# Patient Record
Sex: FEMALE | Race: WHITE | NOT HISPANIC OR LATINO | Employment: PART TIME | URBAN - METROPOLITAN AREA
[De-identification: names, ages, dates, MRNs, and addresses within clinical notes are randomized per-mention and may not be internally consistent; named-entity substitution may affect disease eponyms.]

---

## 2017-01-06 ENCOUNTER — OFFICE VISIT (OUTPATIENT)
Dept: URGENT CARE | Facility: PHYSICIAN GROUP | Age: 62
End: 2017-01-06
Payer: COMMERCIAL

## 2017-01-06 VITALS
SYSTOLIC BLOOD PRESSURE: 134 MMHG | DIASTOLIC BLOOD PRESSURE: 88 MMHG | TEMPERATURE: 99.1 F | WEIGHT: 182.8 LBS | OXYGEN SATURATION: 96 % | BODY MASS INDEX: 29.52 KG/M2 | HEART RATE: 83 BPM | RESPIRATION RATE: 16 BRPM

## 2017-01-06 DIAGNOSIS — Q15.9 EYE ABNORMALITY: ICD-10-CM

## 2017-01-06 PROCEDURE — 99214 OFFICE O/P EST MOD 30 MIN: CPT | Performed by: PHYSICIAN ASSISTANT

## 2017-01-06 ASSESSMENT — ENCOUNTER SYMPTOMS
GASTROINTESTINAL NEGATIVE: 1
PSYCHIATRIC NEGATIVE: 1
EYE REDNESS: 0
MUSCULOSKELETAL NEGATIVE: 1
HEADACHES: 0
PHOTOPHOBIA: 0
EYE PAIN: 0
CARDIOVASCULAR NEGATIVE: 1
EYE DISCHARGE: 0
BLURRED VISION: 0
DIZZINESS: 0
RESPIRATORY NEGATIVE: 1
DOUBLE VISION: 0

## 2017-01-06 NOTE — PATIENT INSTRUCTIONS
[facility] at [address] [place] [address]   [phone]  Head over now as they are expecting you.

## 2017-01-06 NOTE — PROGRESS NOTES
Subjective:      Lurdes Ramon is a 61 y.o. female who presents with Eye Problem            Eye Problem   Pertinent negatives include no blurred vision, eye discharge, double vision, eye redness or photophobia.     Chief Complaint   Patient presents with   • Eye Problem     vission was off lastnight, flash of light, right eye       HPI:  Lurdes Ramon is a 61 y.o. female who presents with right eye concern.  Hx of retinal detachment in 1st cousins.  They were not in an accident.  PAtient states she noticed a flash of light in the right eye last night towards the top of the visual field.  Described as broken glass.  Not still happening.  Was a bright flashing.  No headache associated with symptoms.  No pain, or redness.  No blurred vision.  Symptoms last for a few seconds.  She was on her computer screen prior to this incident.  Wears over the counter glasses.  Has a hx of floaters in her vision.      Past Medical History   Diagnosis Date   • Back pain    • Insomnia        Past Surgical History   Procedure Laterality Date   • Hysterectomy, total abdominal  1994   • Other orthopedic surgery       back surgery x 2   • Dental extraction(s)     • Lithotripsy         Family History   Problem Relation Age of Onset   • Cancer Sister 58     colorectal   • Heart Attack Father    • Heart Disease Brother 61   • Cancer Mother      brain       Social History     Social History   • Marital Status:      Spouse Name: N/A   • Number of Children: N/A   • Years of Education: N/A     Occupational History   • Not on file.     Social History Main Topics   • Smoking status: Former Smoker -- 1.00 packs/day for 15 years     Types: Cigarettes     Quit date: 10/26/1996   • Smokeless tobacco: Never Used   • Alcohol Use: 0.0 oz/week     0 Standard drinks or equivalent per week      Comment: socially   • Drug Use: Not on file   • Sexual Activity: Not on file     Other Topics Concern   • Not on file     Social History  Narrative   • No narrative on file         Current outpatient prescriptions:   •  zolpidem, 10 mg, Oral, HS PRN, 1/5/2017  •  Hydrocodone-Acetaminophen, Take  by mouth., prn  •  estradiol, 1 Patch, Transdermal, QSUN-WED, 12/30/2016  •  alprazolam, 0.5 mg, Oral, HS PRN, prn    Allergies   Allergen Reactions   • Pcn [Penicillins] Hives   • Sulfa Drugs Hives            Review of Systems   Eyes: Negative for blurred vision, double vision, photophobia, pain, discharge and redness.        Flashing light   Respiratory: Negative.    Cardiovascular: Negative.    Gastrointestinal: Negative.    Genitourinary: Negative.    Musculoskeletal: Negative.    Skin: Negative.    Neurological: Negative for dizziness and headaches.   Endo/Heme/Allergies: Negative for environmental allergies.   Psychiatric/Behavioral: Negative.           Objective:     /88 mmHg  Pulse 83  Temp(Src) 37.3 °C (99.1 °F)  Resp 16  Wt 82.918 kg (182 lb 12.8 oz)  SpO2 96%     Physical Exam       Nursing note and Vitals Reviewed.    Constitutional:   Appropriately groomed, pleasant affect, well nourished, in NAD.    Head:   Normocephalic, atraumatic.    Eyes:   PERRLA, EOM's full, sclera white, conjunctiva not erythematous, and medial canthus without exudate bilaterally.  Fundoscopic exam demonstrates a yellow optic disk with surrounding retina without AV nicking or drusen bodies.  No cotton whool spotting.  No retinal detachment.    Ears:  Auricle and tragus non-tender to manipulation.  No pre-auricular lymphadenopathy or mastoid ttp.  EACs with mild cerumen bilaterally, not erythematous.  TM’s pearly gray with cone of light present and umbo and malleolus visible bilaterally.  No bulging or fluid bubbles present in middle ear.  Hearing grossly intact to voice.    Nose:  Nares patent bilaterally.      Throat:  Dentition wnl, mucosa moist without lesions.  Oropharynx mildly erythematous, with no enlargement of the palatine tonsils bilaterally with no  exudates.    Post nasal drainage present.  Soft palate rises symmetrically bilaterally and uvula midline.      Neck: Neck supple, with mild anterior lymphadenopathy that is soft and mobile to palpation. Thyroid non-palpable without tenderness or nodules. No supraclavicular lymphadenopathy.    Lungs:  Respiratory effort not labored without accessory muscle use.  Lungs clear to auscultation bilaterally without wheezes, rales, or rhonchi.    Heart:  RRR, without murmurs rubs or gallops.  Radial and dorsalis pedis pulse 2+ bilaterally.  No LE edema.    Musculoskeletal:  Gait non-antalgic with a narrow base.    Derm:  Skin without rashes or lesions with good turgor pressure.      Psychiatric:  Mood, affect, and judgement appropriate.       Assessment/Plan:     1. Eye abnormality  Patient presents with right eye bright flashing lasted for less than a second last night. Patient was on her computer for extensive amount of time prior to the episode or no previous episodes like this, however patient does have retinal detachment family history. No recent trauma. No change in vision, blurred vision, halos, eye pain. No history of glaucoma in the family. Physical exam unremarkable. Funduscopic exam unremarkable. Given limitation of funduscopic exam in urgent care setting, did send patient to [facility] for urgent evaluation.     Patient was in agreement with this treatment plan and seemed to understand without barriers. All questions were encouraged and answered.  Reviewed signs and symptoms of when to seek emergency medical care.     Please note that this dictation was created using voice recognition software.  I have made every reasonable attempt to correct obvious errors, but I expect there are errors of nakul and possibly content that I did not discover before finalizing the note.

## 2017-01-06 NOTE — MR AVS SNAPSHOT
Lurdes Castro Ramon   2017 8:30 AM   Office Visit   MRN: [id number]    Department:  [facility]   Dept Phone:  [phone]    Description:  Female : 1955   Provider:  Stanton Long PA-C           Reason for Visit     Eye Problem vission was off lastnight, flash of light, right eye      Allergies as of 2017     Allergen Noted Reactions    Pcn [Penicillins] 10/26/2016   Hives    Sulfa Drugs 10/26/2016   Hives      Vital Signs     Blood Pressure Pulse Temperature Respirations Weight Oxygen Saturation    134/88 mmHg 83 37.3 °C (99.1 °F) 16 82.918 kg (182 lb 12.8 oz) 96%    Smoking Status                   Former Smoker           Basic Information     Date Of Birth Sex Race Ethnicity Preferred Language    1955 Female White Non- English      Problem List              ICD-10-CM Priority Class Noted - Resolved    Encounter to establish care with new doctor Z71.89   10/26/2016 - Present    Insomnia G47.00   10/26/2016 - Present    Low back pain M54.5   10/26/2016 - Present    Anxiety F41.9   10/26/2016 - Present      Health Maintenance        Date Due Completion Dates    PAP SMEAR 1976 ---    MAMMOGRAM 1995 ---    COLONOSCOPY 2005 ---    IMM ZOSTER VACCINE 2015 ---    IMM DTaP/Tdap/Td Vaccine (2 - Td) 2026            Current Immunizations     Influenza Vaccine Quad Inj (Preserved) 10/26/2016  8:51 AM    Tdap Vaccine 2016      Below and/or attached are the medications your provider expects you to take. Review all of your home medications and newly ordered medications with your provider and/or pharmacist. Follow medication instructions as directed by your provider and/or pharmacist. Please keep your medication list with you and share with your provider. Update the information when medications are discontinued, doses are changed, or new medications (including over-the-counter products) are added; and carry medication information at all  times in the event of emergency situations     Allergies:  PCN - Hives     SULFA DRUGS - Hives               Medications  Valid as of: January 06, 2017 - 11:05 AM    Generic Name Brand Name Tablet Size Instructions for use    ALPRAZolam (Tab) XANAX 0.5 MG Take 1 Tab by mouth at bedtime as needed for Sleep.        Estradiol (PATCH WEEKLY) CLIMARA 0.075 MG/24HR Apply 1 Patch to skin as directed Every Sunday and Wednesday.        Hydrocodone-Acetaminophen (Tab) Hydrocodone-Acetaminophen 5-300 MG Take  by mouth.        Zolpidem Tartrate (Tab) AMBIEN 10 MG Take 1 Tab by mouth at bedtime as needed for Sleep.        .                 Medicines prescribed today were sent to:     [facility] [id number] [place] [address] AT [address] [facility] [address]    Phone: [phone] Fax: [phone]    Open 24 Hours?: No      Medication refill instructions:       If your prescription bottle indicates you have medication refills left, it is not necessary to call your provider’s office. Please contact your pharmacy and they will refill your medication.    If your prescription bottle indicates you do not have any refills left, you may request refills at any time through one of the following ways: The online [employer] system (except Urgent Care), by calling your provider’s office, or by asking your pharmacy to contact your provider’s office with a refill request. Medication refills are processed only during regular business hours and may not be available until the next business day. Your provider may request additional information or to have a follow-up visit with you prior to refilling your medication.   *Please Note: Medication refills are assigned a new Rx number when refilled electronically. Your pharmacy may indicate that no refills were authorized even though a new prescription for the same medication is available at the pharmacy. Please request the medicine by name with  the pharmacy before contacting your provider for a refill.        Instructions    [facility] at [address] [place] [address] in [place]   [phone]  Head over now as they are expecting you.          [employer] Access Code: Activation code not generated  Current [employer] Status: Active

## 2017-01-25 ENCOUNTER — OFFICE VISIT (OUTPATIENT)
Dept: MEDICAL GROUP | Facility: PHYSICIAN GROUP | Age: 62
End: 2017-01-25
Payer: COMMERCIAL

## 2017-01-25 ENCOUNTER — HOSPITAL ENCOUNTER (OUTPATIENT)
Dept: RADIOLOGY | Facility: MEDICAL CENTER | Age: 62
End: 2017-01-25
Attending: NURSE PRACTITIONER
Payer: COMMERCIAL

## 2017-01-25 VITALS
HEIGHT: 66 IN | HEART RATE: 75 BPM | RESPIRATION RATE: 16 BRPM | OXYGEN SATURATION: 96 % | SYSTOLIC BLOOD PRESSURE: 128 MMHG | BODY MASS INDEX: 29.41 KG/M2 | DIASTOLIC BLOOD PRESSURE: 74 MMHG | TEMPERATURE: 98.4 F | WEIGHT: 183 LBS

## 2017-01-25 DIAGNOSIS — M54.5 LOW BACK PAIN, UNSPECIFIED BACK PAIN LATERALITY, UNSPECIFIED CHRONICITY, WITH SCIATICA PRESENCE UNSPECIFIED: ICD-10-CM

## 2017-01-25 DIAGNOSIS — Z79.890 HORMONE REPLACEMENT THERAPY (HRT): ICD-10-CM

## 2017-01-25 PROCEDURE — 72100 X-RAY EXAM L-S SPINE 2/3 VWS: CPT

## 2017-01-25 PROCEDURE — 99214 OFFICE O/P EST MOD 30 MIN: CPT | Performed by: NURSE PRACTITIONER

## 2017-01-25 RX ORDER — ESTRADIOL 0.07 MG/D
FILM, EXTENDED RELEASE TRANSDERMAL
Refills: 1 | COMMUNITY
Start: 2016-12-26 | End: 2018-01-08

## 2017-01-25 ASSESSMENT — PATIENT HEALTH QUESTIONNAIRE - PHQ9: CLINICAL INTERPRETATION OF PHQ2 SCORE: 0

## 2017-01-25 NOTE — MR AVS SNAPSHOT
"        Lurdes Castro Yenny   2017 9:00 AM   Office Visit   MRN: [id number]    Department:  [facility]   Dept Phone:  [phone]    Description:  Female : 1955   Provider:  BRITTANY Garcia           Reason for Visit     Follow-Up           Allergies as of 2017     Allergen Noted Reactions    Pcn [Penicillins] 10/26/2016   Hives    Sulfa Drugs 10/26/2016   Hives      You were diagnosed with     Hormone replacement therapy (HRT)   [[id number]]       Low back pain, unspecified back pain laterality, unspecified chronicity, with sciatica presence unspecified   [[id number]]         Vital Signs     Blood Pressure Pulse Temperature Respirations Height Weight    128/74 mmHg 75 36.9 °C (98.4 °F) 16 1.676 m (5' 5.98\") 83.008 kg (183 lb)    Body Mass Index Oxygen Saturation Smoking Status             29.55 kg/m2 96% Former Smoker         Basic Information     Date Of Birth Sex Race Ethnicity Preferred Language    1955 Female White Non- English      Your appointments     2017  8:40 AM   Annual/Preventative with JAIDEN Garcia.   [address] [address] [phone]           You will be receiving a confirmation call a few days before your appointment from our automated call confirmation system.   Please bring to your appointment; a photo ID, copies of your insurance card, all medication bottles and any co-pay that you are responsible for.  Please allow 45-60 minutes to complete your scheduled appointment.              Problem List              ICD-10-CM Priority Class Noted - Resolved    Insomnia G47.00   10/26/2016 - Present    Low back pain M54.5   10/26/2016 - Present    Anxiety F41.9   10/26/2016 - Present    Hormone replacement therapy (HRT) Z79.890   2017 - Present      Health Maintenance        Date Due Completion Dates    PAP SMEAR 1976 ---    MAMMOGRAM 1995 ---   " COLONOSCOPY 4/26/2005 ---    IMM ZOSTER VACCINE 4/26/2015 ---    IMM DTaP/Tdap/Td Vaccine (2 - Td) 11/22/2026 11/22/2016            Current Immunizations     Influenza Vaccine Quad Inj (Preserved) 10/26/2016  8:51 AM    Tdap Vaccine 11/22/2016      Below and/or attached are the medications your provider expects you to take. Review all of your home medications and newly ordered medications with your provider and/or pharmacist. Follow medication instructions as directed by your provider and/or pharmacist. Please keep your medication list with you and share with your provider. Update the information when medications are discontinued, doses are changed, or new medications (including over-the-counter products) are added; and carry medication information at all times in the event of emergency situations     Allergies:  PCN - Hives     SULFA DRUGS - Hives               Medications  Valid as of: January 25, 2017 -  9:25 AM    Generic Name Brand Name Tablet Size Instructions for use    ALPRAZolam (Tab) XANAX 0.5 MG Take 1 Tab by mouth at bedtime as needed for Sleep.        Estradiol (PATCH WEEKLY) CLIMARA 0.075 MG/24HR Apply 1 Patch to skin as directed Every Sunday and Wednesday.        Estradiol (PATCH BIWEEKLY) VIVELLE-DOT 0.075 MG/24HR IZZY 1 PA EXT TO THE SKIN 2 TIMES A WEEK        Estradiol (PATCH WEEKLY) CLIMARA 0.1 MG/24HR Apply 1 Patch to skin as directed 2X A WEEK.        Hydrocodone-Acetaminophen (Tab) Hydrocodone-Acetaminophen 5-300 MG Take 1-2 Tabs by mouth every four hours as needed.        Zolpidem Tartrate (Tab) AMBIEN 10 MG Take 1 Tab by mouth at bedtime as needed for Sleep.        .                 Medicines prescribed today were sent to:     [id number]  [facility] - [address] [place] [facility] [address] [facility] [place] [address]    Phone: [phone] Fax: [phone]    Open 24 Hours?: No      Medication refill instructions:       If your prescription bottle  indicates you have medication refills left, it is not necessary to call your provider’s office. Please contact your pharmacy and they will refill your medication.    If your prescription bottle indicates you do not have any refills left, you may request refills at any time through one of the following ways: The online [employer] system (except Urgent Care), by calling your provider’s office, or by asking your pharmacy to contact your provider’s office with a refill request. Medication refills are processed only during regular business hours and may not be available until the next business day. Your provider may request additional information or to have a follow-up visit with you prior to refilling your medication.   *Please Note: Medication refills are assigned a new Rx number when refilled electronically. Your pharmacy may indicate that no refills were authorized even though a new prescription for the same medication is available at the pharmacy. Please request the medicine by name with the pharmacy before contacting your provider for a refill.        Your To Do List     Future Labs/Procedures Complete By Expires    DX-LUMBAR SPINE-2 OR 3 VIEWS  As directed 1/25/2018         [employer] Access Code: Activation code not generated  Current [employer]ift Status: Active

## 2017-01-25 NOTE — PROGRESS NOTES
Chief Complaint   Patient presents with   • Follow-Up       HISTORY OF PRESENT ILLNESS: Patient is a 61 y.o. female established patient who presents today to discuss the following issues:    Low back pain  Has had 2 surgeries, last one was a fusion in 2005.  Recently has been waking up in a lot of pain.  Not related to activity.  Would like to have an xray done.  Discussed plan.  Next step after xray would be referral to neurosurgeon.    Hormone replacement therapy (HRT)  Has been on HRT for 20+ years since total hysterectomy.  Didn't tolerate pills.  Has been on the patch forever and seems to work well.  Would like to go back up to 0.1mg.  Understands the risks vs benefits of HRT, and agrees to get mammograms annually.      Patient Active Problem List    Diagnosis Date Noted   • Hormone replacement therapy (HRT) 01/25/2017   • Insomnia 10/26/2016   • Low back pain 10/26/2016   • Anxiety 10/26/2016       Allergies:Pcn and Sulfa drugs    Current Outpatient Prescriptions   Medication Sig Dispense Refill   • estradiol (VIVELLE-DOT) 0.075 MG/24HR PATCH BIWEEKLY IZZY 1 PA EXT TO THE SKIN 2 TIMES A WEEK  1   • estradiol (CLIMARA) 0.1 MG/24HR PATCH WEEKLY Apply 1 Patch to skin as directed 2X A WEEK. 24 Patch 3   • Hydrocodone-Acetaminophen (VICODIN) 5-300 MG Tab Take 1-2 Tabs by mouth every four hours as needed. 60 Tab 0   • zolpidem (AMBIEN) 10 MG Tab Take 1 Tab by mouth at bedtime as needed for Sleep. 30 Tab 5   • alprazolam (XANAX) 0.5 MG Tab Take 1 Tab by mouth at bedtime as needed for Sleep. 30 Tab 5   • estradiol (CLIMARA) 0.075 MG/24HR PATCH WEEKLY Apply 1 Patch to skin as directed Every Sunday and Wednesday.       No current facility-administered medications for this visit.       Social History   Substance Use Topics   • Smoking status: Former Smoker -- 1.00 packs/day for 15 years     Types: Cigarettes     Quit date: 10/26/1996   • Smokeless tobacco: Never Used   • Alcohol Use: 0.0 oz/week     0 Standard drinks or  "equivalent per week      Comment: socially       No family status information on file.     Family History   Problem Relation Age of Onset   • Cancer Sister 58     colorectal   • Heart Attack Father    • Heart Disease Brother 61   • Cancer Mother      brain       Review of Systems:   Constitutional: Negative for fever, chills, weight loss and malaise/fatigue.   HENT: Negative for ear pain, nosebleeds, congestion, sore throat and neck pain.    Eyes: Negative for blurred vision.   Respiratory: Negative for cough, sputum production, shortness of breath and wheezing.    Cardiovascular: Negative for chest pain, palpitations, orthopnea and leg swelling.   Gastrointestinal: Negative for heartburn, nausea, vomiting and abdominal pain.   Genitourinary: Negative for dysuria, urgency and frequency.   Musculoskeletal: Negative for myalgias snd joint pain.  Positive for chronic low back pain, worsening.  Skin: Negative for rash and itching.   Neurological: Negative for dizziness, tingling, tremors, sensory change, focal weakness and headaches.   Endo/Heme/Allergies: Does not bruise/bleed easily.   Psychiatric/Behavioral: Negative for depression, suicidal ideas and memory loss.  The patient is not nervous/anxious and does not have insomnia.    All other systems reviewed and are negative except as in HPI.    Exam:  Blood pressure 128/74, pulse 75, temperature 36.9 °C (98.4 °F), resp. rate 16, height 1.676 m (5' 5.98\"), weight 83.008 kg (183 lb), SpO2 96 %.  General:  Well nourished, well developed female in NAD  Head: Grossly normal.  Neck: Supple without JVD or bruit. Thyroid is not enlarged.  Pulmonary: Clear to ausculation. Normal effort. No rales, ronchi, or wheezing.  Cardiovascular: Regular rate and rhythm without murmur.   Extremities: No clubbing, cyanosis, or edema.  Skin: Intact with no obvious rashes or lesions.  Neuro: Grossly intact.  Psych: Alert and oriented x 3.  Mood and affect appropriate.    Medical " decision-making and discussion: Lurdes is here today to discuss HRT and low back pain.  An xray was ordered and a prescription for estradiol patch was sent to her pharmacy.  She will plan to follow-up here as needed.          Assessment/Plan:  1. Hormone replacement therapy (HRT)  estradiol (CLIMARA) 0.1 MG/24HR PATCH WEEKLY   2. Low back pain, unspecified back pain laterality, unspecified chronicity, with sciatica presence unspecified  DX-LUMBAR SPINE-2 OR 3 VIEWS       Return if symptoms worsen or fail to improve.    Please note that this dictation was created using voice recognition software. I have made every reasonable attempt to correct obvious errors, but I expect that there are errors of grammar and possibly content that I did not discover before finalizing the note.

## 2017-01-25 NOTE — ASSESSMENT & PLAN NOTE
Has been on HRT for 20+ years since total hysterectomy.  Didn't tolerate pills.  Has been on the patch forever and seems to work well.  Would like to go back up to 0.1mg.  Understands the risks vs benefits of HRT, and agrees to get mammograms annually.

## 2017-01-25 NOTE — ASSESSMENT & PLAN NOTE
Has had 2 surgeries, last one was a fusion in 2005.  Recently has been waking up in a lot of pain.  Not related to activity.  Would like to have an xray done.  Discussed plan.  Next step after xray would be referral to neurosurgeon.

## 2017-04-04 ENCOUNTER — OFFICE VISIT (OUTPATIENT)
Dept: MEDICAL GROUP | Facility: PHYSICIAN GROUP | Age: 62
End: 2017-04-04
Payer: COMMERCIAL

## 2017-04-04 ENCOUNTER — HOSPITAL ENCOUNTER (OUTPATIENT)
Dept: LAB | Facility: MEDICAL CENTER | Age: 62
End: 2017-04-04
Attending: NURSE PRACTITIONER
Payer: COMMERCIAL

## 2017-04-04 VITALS
DIASTOLIC BLOOD PRESSURE: 76 MMHG | HEIGHT: 66 IN | HEART RATE: 75 BPM | SYSTOLIC BLOOD PRESSURE: 128 MMHG | TEMPERATURE: 98.8 F | OXYGEN SATURATION: 95 % | RESPIRATION RATE: 16 BRPM | BODY MASS INDEX: 28.93 KG/M2 | WEIGHT: 180 LBS

## 2017-04-04 DIAGNOSIS — Z00.00 ENCOUNTER FOR WELLNESS EXAMINATION: ICD-10-CM

## 2017-04-04 DIAGNOSIS — Z12.39 SCREENING FOR BREAST CANCER: ICD-10-CM

## 2017-04-04 DIAGNOSIS — M54.5 LOW BACK PAIN, UNSPECIFIED BACK PAIN LATERALITY, UNSPECIFIED CHRONICITY, WITH SCIATICA PRESENCE UNSPECIFIED: ICD-10-CM

## 2017-04-04 LAB
25(OH)D3 SERPL-MCNC: 28 NG/ML (ref 30–100)
ALBUMIN SERPL BCP-MCNC: 4.3 G/DL (ref 3.2–4.9)
ALBUMIN/GLOB SERPL: 1.2 G/DL
ALP SERPL-CCNC: 82 U/L (ref 30–99)
ALT SERPL-CCNC: 14 U/L (ref 2–50)
ANION GAP SERPL CALC-SCNC: 7 MMOL/L (ref 0–11.9)
AST SERPL-CCNC: 18 U/L (ref 12–45)
BASOPHILS # BLD AUTO: 1.1 % (ref 0–1.8)
BASOPHILS # BLD: 0.08 K/UL (ref 0–0.12)
BILIRUB SERPL-MCNC: 0.9 MG/DL (ref 0.1–1.5)
BUN SERPL-MCNC: 15 MG/DL (ref 8–22)
CALCIUM SERPL-MCNC: 9.4 MG/DL (ref 8.5–10.5)
CHLORIDE SERPL-SCNC: 103 MMOL/L (ref 96–112)
CHOLEST SERPL-MCNC: 182 MG/DL (ref 100–199)
CO2 SERPL-SCNC: 28 MMOL/L (ref 20–33)
CREAT SERPL-MCNC: 0.61 MG/DL (ref 0.5–1.4)
EOSINOPHIL # BLD AUTO: 0.08 K/UL (ref 0–0.51)
EOSINOPHIL NFR BLD: 1.1 % (ref 0–6.9)
ERYTHROCYTE [DISTWIDTH] IN BLOOD BY AUTOMATED COUNT: 41 FL (ref 35.9–50)
GFR SERPL CREATININE-BSD FRML MDRD: >60 ML/MIN/1.73 M 2
GLOBULIN SER CALC-MCNC: 3.5 G/DL (ref 1.9–3.5)
GLUCOSE SERPL-MCNC: 98 MG/DL (ref 65–99)
HCT VFR BLD AUTO: 43.7 % (ref 37–47)
HDLC SERPL-MCNC: 54 MG/DL
HGB BLD-MCNC: 14.8 G/DL (ref 12–16)
IMM GRANULOCYTES # BLD AUTO: 0.01 K/UL (ref 0–0.11)
IMM GRANULOCYTES NFR BLD AUTO: 0.1 % (ref 0–0.9)
LDLC SERPL CALC-MCNC: 112 MG/DL
LYMPHOCYTES # BLD AUTO: 1.77 K/UL (ref 1–4.8)
LYMPHOCYTES NFR BLD: 24.9 % (ref 22–41)
MCH RBC QN AUTO: 31.5 PG (ref 27–33)
MCHC RBC AUTO-ENTMCNC: 33.9 G/DL (ref 33.6–35)
MCV RBC AUTO: 93 FL (ref 81.4–97.8)
MONOCYTES # BLD AUTO: 0.51 K/UL (ref 0–0.85)
MONOCYTES NFR BLD AUTO: 7.2 % (ref 0–13.4)
NEUTROPHILS # BLD AUTO: 4.65 K/UL (ref 2–7.15)
NEUTROPHILS NFR BLD: 65.6 % (ref 44–72)
NRBC # BLD AUTO: 0 K/UL
NRBC BLD AUTO-RTO: 0 /100 WBC
PLATELET # BLD AUTO: 264 K/UL (ref 164–446)
PMV BLD AUTO: 10.8 FL (ref 9–12.9)
POTASSIUM SERPL-SCNC: 4.1 MMOL/L (ref 3.6–5.5)
PROT SERPL-MCNC: 7.8 G/DL (ref 6–8.2)
RBC # BLD AUTO: 4.7 M/UL (ref 4.2–5.4)
SODIUM SERPL-SCNC: 138 MMOL/L (ref 135–145)
TRIGL SERPL-MCNC: 79 MG/DL (ref 0–149)
TSH SERPL DL<=0.005 MIU/L-ACNC: 1.2 UIU/ML (ref 0.3–3.7)
WBC # BLD AUTO: 7.1 K/UL (ref 4.8–10.8)

## 2017-04-04 PROCEDURE — 36415 COLL VENOUS BLD VENIPUNCTURE: CPT

## 2017-04-04 PROCEDURE — 82306 VITAMIN D 25 HYDROXY: CPT

## 2017-04-04 PROCEDURE — 85025 COMPLETE CBC W/AUTO DIFF WBC: CPT

## 2017-04-04 PROCEDURE — 80053 COMPREHEN METABOLIC PANEL: CPT

## 2017-04-04 PROCEDURE — 84443 ASSAY THYROID STIM HORMONE: CPT

## 2017-04-04 PROCEDURE — 99396 PREV VISIT EST AGE 40-64: CPT | Performed by: NURSE PRACTITIONER

## 2017-04-04 PROCEDURE — 80061 LIPID PANEL: CPT

## 2017-04-04 NOTE — ASSESSMENT & PLAN NOTE
Is here for her annual wellness visit.  Is due for labs and a mammogram. Has no current concerns or complaints.

## 2017-04-04 NOTE — ASSESSMENT & PLAN NOTE
Saw a Dr. Doherty's PA at [facility].  Is taking Aleve and will start physical therapy.  She will follow-up with them in May.

## 2017-04-04 NOTE — MR AVS SNAPSHOT
"        Lurdes Castro Yenny   2017 8:40 AM   Office Visit   MRN: [id number]    Department:  [facility]   Dept Phone:  [phone]    Description:  Female : 1955   Provider:  BRITTANY Garcia           Reason for Visit     Annual Exam           Allergies as of 2017     Allergen Noted Reactions    Pcn [Penicillins] 10/26/2016   Hives    Sulfa Drugs 10/26/2016   Hives      You were diagnosed with     Encounter for wellness examination   [1899567]       Screening for breast cancer   [358135]         Vital Signs     Blood Pressure Pulse Temperature Respirations Height Weight    128/76 mmHg 75 37.1 °C (98.8 °F) 16 1.676 m (5' 6\") 81.647 kg (180 lb)    Body Mass Index Oxygen Saturation Smoking Status             29.07 kg/m2 95% Former Smoker         Basic Information     Date Of Birth Sex Race Ethnicity Preferred Language    1955 Female White Non- English      Problem List              ICD-10-CM Priority Class Noted - Resolved    Insomnia G47.00   10/26/2016 - Present    Low back pain M54.5   10/26/2016 - Present    Anxiety F41.9   10/26/2016 - Present    Hormone replacement therapy (HRT) Z79.890   2017 - Present    Encounter for wellness examination Z00.00   2017 - Present    Screening for breast cancer Z12.39   2017 - Present      Health Maintenance        Date Due Completion Dates    PAP SMEAR 1976 ---    MAMMOGRAM 1995 ---    COLONOSCOPY 2005 ---    IMM ZOSTER VACCINE 2015 ---    IMM DTaP/Tdap/Td Vaccine (2 - Td) 2026            Current Immunizations     Influenza Vaccine Quad Inj (Preserved) 10/26/2016  8:51 AM    Tdap Vaccine 2016      Below and/or attached are the medications your provider expects you to take. Review all of your home medications and newly ordered medications with your provider and/or pharmacist. Follow medication instructions as directed by your provider and/or pharmacist. Please keep your " medication list with you and share with your provider. Update the information when medications are discontinued, doses are changed, or new medications (including over-the-counter products) are added; and carry medication information at all times in the event of emergency situations     Allergies:  PCN - Hives     SULFA DRUGS - Hives               Medications  Valid as of: April 04, 2017 -  9:11 AM    Generic Name Brand Name Tablet Size Instructions for use    ALPRAZolam (Tab) XANAX 0.5 MG Take 1 Tab by mouth at bedtime as needed for Sleep.        Estradiol (PATCH WEEKLY) CLIMARA 0.075 MG/24HR Apply 1 Patch to skin as directed Every Sunday and Wednesday.        Estradiol (PATCH BIWEEKLY) VIVELLE-DOT 0.075 MG/24HR IZZY 1 PA EXT TO THE SKIN 2 TIMES A WEEK        Estradiol (PATCH WEEKLY) CLIMARA 0.1 MG/24HR Apply 1 Patch to skin as directed 2X A WEEK.        Hydrocodone-Acetaminophen (Tab) Hydrocodone-Acetaminophen 5-300 MG Take 1-2 Tabs by mouth every four hours as needed.        Zolpidem Tartrate (Tab) AMBIEN 10 MG Take 1 Tab by mouth at bedtime as needed for Sleep.        .                 Medicines prescribed today were sent to:     [facility] [id number] [place] [address], [address] AT [address] [facility] [address]    Phone: [phone] Fax: [phone]    Open 24 Hours?: No      Medication refill instructions:       If your prescription bottle indicates you have medication refills left, it is not necessary to call your provider’s office. Please contact your pharmacy and they will refill your medication.    If your prescription bottle indicates you do not have any refills left, you may request refills at any time through one of the following ways: The online [employer] system (except Urgent Care), by calling your provider’s office, or by asking your pharmacy to contact your provider’s office with a refill request. Medication refills are processed only during regular  business hours and may not be available until the next business day. Your provider may request additional information or to have a follow-up visit with you prior to refilling your medication.   *Please Note: Medication refills are assigned a new Rx number when refilled electronically. Your pharmacy may indicate that no refills were authorized even though a new prescription for the same medication is available at the pharmacy. Please request the medicine by name with the pharmacy before contacting your provider for a refill.        Your To Do List     Future Labs/Procedures Complete By Expires    CBC WITH DIFFERENTIAL  As directed 4/4/2018    COMP METABOLIC PANEL  As directed 4/4/2018    LIPID PROFILE  As directed 4/4/2018    MA-SCREEN MAMMO W/CAD-BILAT  As directed 4/4/2018    TSH  As directed 4/4/2018    VITAMIN D,25 HYDROXY  As directed 4/4/2018         MyChart Access Code: Activation code not generated  Current FiscalNote Status: Active

## 2017-04-05 NOTE — PROGRESS NOTES
Chief Complaint   Patient presents with   • Annual Exam       HISTORY OF PRESENT ILLNESS: Patient is a 61 y.o. female established patient who presents today to discuss the following issues:    Screening for breast cancer  Due for screening mammogram after April 8th.    Encounter for wellness examination  Is here for her annual wellness visit.  Is due for labs and a mammogram. Has no current concerns or complaints.    Low back pain  Saw a Dr. Doherty's PA at [facility].  Is taking Aleve and will start physical therapy.  She will follow-up with them in May.      Patient Active Problem List    Diagnosis Date Noted   • Encounter for wellness examination 04/04/2017   • Screening for breast cancer 04/04/2017   • Hormone replacement therapy (HRT) 01/25/2017   • Insomnia 10/26/2016   • Low back pain 10/26/2016   • Anxiety 10/26/2016       Allergies:Pcn and Sulfa drugs    Current Outpatient Prescriptions   Medication Sig Dispense Refill   • estradiol (CLIMARA) 0.1 MG/24HR PATCH WEEKLY Apply 1 Patch to skin as directed 2X A WEEK. 24 Patch 3   • Hydrocodone-Acetaminophen (VICODIN) 5-300 MG Tab Take 1-2 Tabs by mouth every four hours as needed. 60 Tab 0   • zolpidem (AMBIEN) 10 MG Tab Take 1 Tab by mouth at bedtime as needed for Sleep. 30 Tab 5   • alprazolam (XANAX) 0.5 MG Tab Take 1 Tab by mouth at bedtime as needed for Sleep. 30 Tab 5   • estradiol (VIVELLE-DOT) 0.075 MG/24HR PATCH BIWEEKLY IZZY 1 PA EXT TO THE SKIN 2 TIMES A WEEK  1   • estradiol (CLIMARA) 0.075 MG/24HR PATCH WEEKLY Apply 1 Patch to skin as directed Every Sunday and Wednesday.       No current facility-administered medications for this visit.       Social History   Substance Use Topics   • Smoking status: Former Smoker -- 1.00 packs/day for 15 years     Types: Cigarettes     Quit date: 10/26/1996   • Smokeless tobacco: Never Used   • Alcohol Use: 0.0 oz/week     0 Standard drinks or equivalent per week      Comment: socially       No family status  "information on file.     Family History   Problem Relation Age of Onset   • Cancer Sister 58     colorectal   • Heart Attack Father    • Heart Disease Brother 61   • Cancer Mother      brain       Review of Systems:   Constitutional: Negative for fever, chills, weight loss and malaise/fatigue.   HENT: Negative for ear pain, nosebleeds, congestion, sore throat and neck pain.    Eyes: Negative for blurred vision.   Respiratory: Negative for cough, sputum production, shortness of breath and wheezing.    Cardiovascular: Negative for chest pain, palpitations, orthopnea and leg swelling.   Gastrointestinal: Negative for heartburn, nausea, vomiting and abdominal pain.   Genitourinary: Negative for dysuria, urgency and frequency.   Musculoskeletal: Negative for myalgias and joint pain.  Positive for chronic low back pain.  Skin: Negative for rash and itching.   Neurological: Negative for dizziness, tingling, tremors, sensory change, focal weakness and headaches.   Endo/Heme/Allergies: Does not bruise/bleed easily.   Psychiatric/Behavioral: Negative for depression, suicidal ideas and memory loss.  The patient is not nervous/anxious and does not have insomnia.    All other systems reviewed and are negative except as in HPI.    Exam:  Blood pressure 128/76, pulse 75, temperature 37.1 °C (98.8 °F), resp. rate 16, height 1.676 m (5' 6\"), weight 81.647 kg (180 lb), SpO2 95 %.  General:  Well nourished, well developed female in NAD  Head: Grossly normal.  Neck: Supple without JVD or bruit. Thyroid is not enlarged.  Pulmonary: Clear to ausculation. Normal effort. No rales, ronchi, or wheezing.  Cardiovascular: Regular rate and rhythm without murmur.   Extremities: No clubbing, cyanosis, or edema.  Skin: Intact with no obvious rashes or lesions.  Neuro: Grossly intact.  Psych: Alert and oriented x 3.  Mood and affect appropriate.    Medical decision-making and discussion: Lurdes is here today to discuss annual wellness visit.  Lab " work and a screening mammogram were ordered.  She will follow-up with [facility] as directed, and she will follow-up here as needed.          Assessment/Plan:  1. Encounter for wellness examination  CBC WITH DIFFERENTIAL    COMP METABOLIC PANEL    LIPID PROFILE    TSH    VITAMIN D,25 HYDROXY   2. Screening for breast cancer  MA-SCREEN MAMMO W/CAD-BILAT   3. Low back pain, unspecified back pain laterality, unspecified chronicity, with sciatica presence unspecified         Return if symptoms worsen or fail to improve.    Please note that this dictation was created using voice recognition software. I have made every reasonable attempt to correct obvious errors, but I expect that there are errors of grammar and possibly content that I did not discover before finalizing the note.

## 2017-04-29 ENCOUNTER — HOSPITAL ENCOUNTER (OUTPATIENT)
Dept: RADIOLOGY | Facility: MEDICAL CENTER | Age: 62
End: 2017-04-29
Attending: NURSE PRACTITIONER
Payer: COMMERCIAL

## 2017-04-29 DIAGNOSIS — Z12.39 SCREENING FOR BREAST CANCER: ICD-10-CM

## 2017-04-29 PROCEDURE — 77063 BREAST TOMOSYNTHESIS BI: CPT

## 2017-05-23 ENCOUNTER — OFFICE VISIT (OUTPATIENT)
Dept: URGENT CARE | Facility: PHYSICIAN GROUP | Age: 62
End: 2017-05-23
Payer: COMMERCIAL

## 2017-05-23 VITALS
HEART RATE: 76 BPM | WEIGHT: 170 LBS | RESPIRATION RATE: 14 BRPM | SYSTOLIC BLOOD PRESSURE: 122 MMHG | TEMPERATURE: 98.2 F | BODY MASS INDEX: 27.45 KG/M2 | OXYGEN SATURATION: 97 % | DIASTOLIC BLOOD PRESSURE: 72 MMHG

## 2017-05-23 DIAGNOSIS — Z91.09 ENVIRONMENTAL ALLERGIES: ICD-10-CM

## 2017-05-23 DIAGNOSIS — J30.81 CAT ALLERGIES: ICD-10-CM

## 2017-05-23 PROCEDURE — 99214 OFFICE O/P EST MOD 30 MIN: CPT | Performed by: FAMILY MEDICINE

## 2017-05-23 RX ORDER — CETIRIZINE HYDROCHLORIDE 10 MG/1
10 TABLET ORAL DAILY
Qty: 30 TAB | Refills: 1 | Status: SHIPPED | OUTPATIENT
Start: 2017-05-23 | End: 2018-01-08

## 2017-05-23 RX ORDER — KETOTIFEN FUMARATE 0.25 MG/ML
1 SOLUTION/ DROPS OPHTHALMIC 2 TIMES DAILY
Qty: 10 ML | Refills: 0 | Status: SHIPPED | OUTPATIENT
Start: 2017-05-23 | End: 2018-01-08

## 2017-05-23 ASSESSMENT — ENCOUNTER SYMPTOMS
SHORTNESS OF BREATH: 0
HEADACHES: 0
BLURRED VISION: 0
FOREIGN BODY SENSATION: 0
VOMITING: 0
DOUBLE VISION: 0
EYE REDNESS: 1
PHOTOPHOBIA: 0
DIZZINESS: 1
EYE DISCHARGE: 1
CHILLS: 0
FEVER: 0
NAUSEA: 0

## 2017-05-23 NOTE — MR AVS SNAPSHOT
Lurdes Castro Yenny   2017 8:30 AM   Office Visit   MRN: [id number]    Department:  [facility]   Dept Phone:  [phone]    Description:  Female : 1955   Provider:  Jorge Luis Bolden M.D.           Reason for Visit     Itchy Eye red eyes x 2 weeks - pt recently got 2 new cats and she is allergic to cats      Allergies as of 2017     Allergen Noted Reactions    Pcn [Penicillins] 10/26/2016   Hives    Sulfa Drugs 10/26/2016   Hives      You were diagnosed with     Cat allergies   [513448]       Environmental allergies   [910087]         Vital Signs     Blood Pressure Pulse Temperature Respirations Weight Oxygen Saturation    122/72 mmHg 76 36.8 °C (98.2 °F) 14 77.111 kg (170 lb) 97%    Smoking Status                   Former Smoker           Basic Information     Date Of Birth Sex Race Ethnicity Preferred Language    1955 Female White Non- English      Problem List              ICD-10-CM Priority Class Noted - Resolved    Insomnia G47.00   10/26/2016 - Present    Low back pain M54.5   10/26/2016 - Present    Anxiety F41.9   10/26/2016 - Present    Hormone replacement therapy (HRT) Z79.890   2017 - Present    Encounter for wellness examination Z00.00   2017 - Present    Screening for breast cancer Z12.39   2017 - Present      Health Maintenance        Date Due Completion Dates    PAP SMEAR 1976 ---    COLONOSCOPY 2005 ---    IMM ZOSTER VACCINE 2015 ---    MAMMOGRAM 2018    IMM DTaP/Tdap/Td Vaccine (2 - Td) 2026            Current Immunizations     Influenza Vaccine Quad Inj (Preserved) 10/26/2016  8:51 AM    Tdap Vaccine 2016      Below and/or attached are the medications your provider expects you to take. Review all of your home medications and newly ordered medications with your provider and/or pharmacist. Follow medication instructions as directed by your provider and/or pharmacist. Please keep your  medication list with you and share with your provider. Update the information when medications are discontinued, doses are changed, or new medications (including over-the-counter products) are added; and carry medication information at all times in the event of emergency situations     Allergies:  PCN - Hives     SULFA DRUGS - Hives               Medications  Valid as of: May 23, 2017 -  9:07 AM    Generic Name Brand Name Tablet Size Instructions for use    ALPRAZolam (Tab) XANAX 0.5 MG Take 1 Tab by mouth at bedtime as needed for Sleep.        Cetirizine HCl (Tab) ZYRTEC 10 MG Take 1 Tab by mouth every day.        Estradiol (PATCH WEEKLY) CLIMARA 0.075 MG/24HR Apply 1 Patch to skin as directed Every Sunday and Wednesday.        Estradiol (PATCH BIWEEKLY) VIVELLE-DOT 0.075 MG/24HR IZZY 1 PA EXT TO THE SKIN 2 TIMES A WEEK        Estradiol (PATCH WEEKLY) CLIMARA 0.1 MG/24HR Apply 1 Patch to skin as directed 2X A WEEK.        Hydrocodone-Acetaminophen (Tab) Hydrocodone-Acetaminophen 5-300 MG Take 1-2 Tabs by mouth every four hours as needed.        Ketotifen Fumarate (Solution) ZADITOR 0.025 % Place 1 Drop in both eyes 2 times a day.        Zolpidem Tartrate (Tab) AMBIEN 10 MG Take 1 Tab by mouth at bedtime as needed for Sleep.        .                 Medicines prescribed today were sent to:     [facility] [address] [place] [address] AT [address] [address]    Phone: [phone] Fax: [phone]    Open 24 Hours?: No      Medication refill instructions:       If your prescription bottle indicates you have medication refills left, it is not necessary to call your provider’s office. Please contact your pharmacy and they will refill your medication.    If your prescription bottle indicates you do not have any refills left, you may request refills at any time through one of the following ways: The online [employer] system (except Urgent Care), by  calling your provider’s office, or by asking your pharmacy to contact your provider’s office with a refill request. Medication refills are processed only during regular business hours and may not be available until the next business day. Your provider may request additional information or to have a follow-up visit with you prior to refilling your medication.   *Please Note: Medication refills are assigned a new Rx number when refilled electronically. Your pharmacy may indicate that no refills were authorized even though a new prescription for the same medication is available at the pharmacy. Please request the medicine by name with the pharmacy before contacting your provider for a refill.           SeeMore Interactive Access Code: Activation code not generated  Current SeeMore Interactive Status: Active

## 2017-05-23 NOTE — PROGRESS NOTES
Subjective:      Lurdes Ramon is a 62 y.o. female who presents with Itchy Eye            Eye Problem   Both eyes are affected.This is a new problem. The current episode started 1 to 4 weeks ago (2 weeks). The problem occurs constantly. The problem has been gradually worsening. There was no injury mechanism. The patient is experiencing no pain. There is no known exposure to pink eye. She does not wear contacts. Associated symptoms include an eye discharge, eye redness and itching. Pertinent negatives include no blurred vision, double vision, fever, foreign body sensation, nausea, photophobia, recent URI or vomiting. Treatments tried: allergy drops, visine. The treatment provided mild relief.       Review of Systems   Constitutional: Negative for fever and chills.   Eyes: Positive for discharge and redness. Negative for blurred vision, double vision and photophobia.   Respiratory: Negative for shortness of breath.    Cardiovascular: Negative for chest pain.   Gastrointestinal: Negative for nausea and vomiting.   Skin: Positive for itching.   Neurological: Positive for dizziness. Negative for headaches.        Baseline BPPV     PMH:  has a past medical history of Back pain and Insomnia.  MEDS:   Current outpatient prescriptions:   •  estradiol (CLIMARA) 0.1 MG/24HR PATCH WEEKLY, Apply 1 Patch to skin as directed 2X A WEEK., Disp: 24 Patch, Rfl: 3  •  zolpidem (AMBIEN) 10 MG Tab, Take 1 Tab by mouth at bedtime as needed for Sleep., Disp: 30 Tab, Rfl: 5  •  estradiol (VIVELLE-DOT) 0.075 MG/24HR PATCH BIWEEKLY, IZZY 1 PA EXT TO THE SKIN 2 TIMES A WEEK, Disp: , Rfl: 1  •  Hydrocodone-Acetaminophen (VICODIN) 5-300 MG Tab, Take 1-2 Tabs by mouth every four hours as needed., Disp: 60 Tab, Rfl: 0  •  estradiol (CLIMARA) 0.075 MG/24HR PATCH WEEKLY, Apply 1 Patch to skin as directed Every Sunday and Wednesday., Disp: , Rfl:   •  alprazolam (XANAX) 0.5 MG Tab, Take 1 Tab by mouth at bedtime as needed for Sleep., Disp: 30  Tab, Rfl: 5  ALLERGIES:   Allergies   Allergen Reactions   • Pcn [Penicillins] Hives   • Sulfa Drugs Hives     SURGHX:   Past Surgical History   Procedure Laterality Date   • Hysterectomy, total abdominal  1994   • Other orthopedic surgery       back surgery x 2   • Dental extraction(s)     • Lithotripsy       SOCHX:  reports that she quit smoking about 20 years ago. Her smoking use included Cigarettes. She has a 15 pack-year smoking history. She has never used smokeless tobacco. She reports that she drinks alcohol. She reports that she does not use illicit drugs.  FH: Family history was reviewed, no pertinent findings to report       Objective:     /72 mmHg  Pulse 76  Temp(Src) 36.8 °C (98.2 °F)  Resp 14  Wt 77.111 kg (170 lb)  SpO2 97%     Physical Exam   Constitutional: She appears well-developed.   HENT:   Head: Normocephalic.   Right Ear: External ear normal.   Left Ear: External ear normal.   Mouth/Throat: Oropharyngeal exudate present.   Nasal congestion   Eyes: Pupils are equal, round, and reactive to light. Right eye exhibits no discharge. Left eye exhibits no discharge. Right conjunctiva is not injected. Left conjunctiva is not injected. Right eye exhibits normal extraocular motion. Left eye exhibits normal extraocular motion. Right pupil is round and reactive. Left pupil is round and reactive. Pupils are equal.   Neck: Neck supple. No thyromegaly present.   Cardiovascular: Normal rate.  Exam reveals no friction rub.    No murmur heard.  Pulmonary/Chest: Effort normal. No respiratory distress. She has no wheezes.   Abdominal: Soft. She exhibits no distension. There is no tenderness. There is no guarding.   Lymphadenopathy:     She has no cervical adenopathy.   Neurological: She is alert.   Skin: Skin is warm and dry. No erythema.   Psychiatric: She has a normal mood and affect. Her behavior is normal.               Assessment/Plan:     1. Cat allergies  ketotifen (ZADITOR) 0.025 % ophthalmic  solution   2. Environmental allergies  cetirizine (ZYRTEC) 10 MG Tab     Supportive care  Push fluids  Monitor temperature  Follow-up if symptoms worsen or fail to improve

## 2017-05-26 DIAGNOSIS — G47.00 INSOMNIA, UNSPECIFIED TYPE: ICD-10-CM

## 2017-05-26 NOTE — TELEPHONE ENCOUNTER
Was the patient seen in the last year in this department? Yes 04/04/2017    Does patient have an active prescription for medications requested? No     Received Request Via: Pharmacy

## 2017-05-30 RX ORDER — ZOLPIDEM TARTRATE 10 MG/1
10 TABLET ORAL NIGHTLY PRN
Qty: 30 TAB | Refills: 5 | Status: SHIPPED
Start: 2017-05-30 | End: 2017-12-21 | Stop reason: SDUPTHER

## 2017-06-13 ENCOUNTER — HOSPITAL ENCOUNTER (OUTPATIENT)
Dept: RADIOLOGY | Facility: MEDICAL CENTER | Age: 62
End: 2017-06-13
Attending: NEUROLOGICAL SURGERY
Payer: COMMERCIAL

## 2017-06-13 DIAGNOSIS — M96.1 POSTLAMINECTOMY SYNDROME, UNSPECIFIED REGION: ICD-10-CM

## 2017-06-13 PROCEDURE — 72110 X-RAY EXAM L-2 SPINE 4/>VWS: CPT

## 2017-08-28 NOTE — TELEPHONE ENCOUNTER
From: Lurdes Ramon  Sent: 8/28/2017 3:39 PM PDT  Subject: Medication Renewal Request    Lurdes Ramon would like a refill of the following medications:  Hydrocodone-Acetaminophen (VICODIN) 5-300 MG Tab [Ritchie Barroso, A.P.N.]    Preferred pharmacy: [facility] [address] [place] [address], [place] - [facility] [address] AT [facility] [address]    Comment:

## 2017-08-29 RX ORDER — HYDROCODONE BITARTRATE AND ACETAMINOPHEN 5; 300 MG/1; MG/1
1-2 TABLET ORAL EVERY 4 HOURS PRN
Qty: 60 TAB | Refills: 0 | Status: SHIPPED | OUTPATIENT
Start: 2017-08-29 | End: 2018-01-08

## 2017-12-09 DIAGNOSIS — Z79.890 HORMONE REPLACEMENT THERAPY (HRT): ICD-10-CM

## 2017-12-11 RX ORDER — ESTRADIOL 0.1 MG/D
FILM, EXTENDED RELEASE TRANSDERMAL
Qty: 24 PATCH | Refills: 1 | Status: SHIPPED | OUTPATIENT
Start: 2017-12-11 | End: 2018-06-01 | Stop reason: SDUPTHER

## 2017-12-11 NOTE — TELEPHONE ENCOUNTER
Was the patient seen in the last year in this department? Yes     Does patient have an active prescription for medications requested? No     Received Request Via: Pharmacy      Pt met protocol?: Yes    LAST OV 04/04/2017

## 2017-12-13 DIAGNOSIS — F41.9 ANXIETY: ICD-10-CM

## 2017-12-13 NOTE — TELEPHONE ENCOUNTER
Was the patient seen in the last year in this department? Yes 04/04/17    Does patient have an active prescription for medications requested? No     Received Request Via: Pharmacy

## 2017-12-14 RX ORDER — ALPRAZOLAM 0.5 MG/1
0.5 TABLET ORAL NIGHTLY PRN
Qty: 30 TAB | Refills: 1 | Status: SHIPPED
Start: 2017-12-14 | End: 2018-04-03 | Stop reason: SDUPTHER

## 2017-12-21 DIAGNOSIS — G47.00 INSOMNIA, UNSPECIFIED TYPE: ICD-10-CM

## 2017-12-21 RX ORDER — ZOLPIDEM TARTRATE 10 MG/1
10 TABLET ORAL NIGHTLY PRN
Qty: 30 TAB | Refills: 5 | Status: SHIPPED
Start: 2017-12-21 | End: 2018-01-08

## 2018-01-08 ENCOUNTER — HOSPITAL ENCOUNTER (EMERGENCY)
Facility: MEDICAL CENTER | Age: 63
End: 2018-01-08
Attending: EMERGENCY MEDICINE
Payer: COMMERCIAL

## 2018-01-08 ENCOUNTER — APPOINTMENT (OUTPATIENT)
Dept: RADIOLOGY | Facility: MEDICAL CENTER | Age: 63
End: 2018-01-08
Attending: EMERGENCY MEDICINE
Payer: COMMERCIAL

## 2018-01-08 VITALS
BODY MASS INDEX: 28.45 KG/M2 | WEIGHT: 177.03 LBS | TEMPERATURE: 98.4 F | HEIGHT: 66 IN | HEART RATE: 74 BPM | SYSTOLIC BLOOD PRESSURE: 145 MMHG | OXYGEN SATURATION: 96 % | DIASTOLIC BLOOD PRESSURE: 93 MMHG | RESPIRATION RATE: 16 BRPM

## 2018-01-08 DIAGNOSIS — R42 VERTIGO: ICD-10-CM

## 2018-01-08 LAB
ALBUMIN SERPL BCP-MCNC: 4.8 G/DL (ref 3.2–4.9)
ALBUMIN/GLOB SERPL: 1.4 G/DL
ALP SERPL-CCNC: 82 U/L (ref 30–99)
ALT SERPL-CCNC: 25 U/L (ref 2–50)
ANION GAP SERPL CALC-SCNC: 11 MMOL/L (ref 0–11.9)
APPEARANCE UR: CLEAR
AST SERPL-CCNC: 27 U/L (ref 12–45)
BACTERIA #/AREA URNS HPF: ABNORMAL /HPF
BASOPHILS # BLD AUTO: 0.8 % (ref 0–1.8)
BASOPHILS # BLD: 0.06 K/UL (ref 0–0.12)
BILIRUB SERPL-MCNC: 1 MG/DL (ref 0.1–1.5)
BILIRUB UR QL STRIP.AUTO: NEGATIVE
BUN SERPL-MCNC: 13 MG/DL (ref 8–22)
CALCIUM SERPL-MCNC: 9.8 MG/DL (ref 8.4–10.2)
CHLORIDE SERPL-SCNC: 103 MMOL/L (ref 96–112)
CO2 SERPL-SCNC: 25 MMOL/L (ref 20–33)
COLOR UR: YELLOW
CREAT SERPL-MCNC: 0.72 MG/DL (ref 0.5–1.4)
CULTURE IF INDICATED INDCX: YES UA CULTURE
EKG IMPRESSION: NORMAL
EOSINOPHIL # BLD AUTO: 0.03 K/UL (ref 0–0.51)
EOSINOPHIL NFR BLD: 0.4 % (ref 0–6.9)
EPI CELLS #/AREA URNS HPF: ABNORMAL /HPF
ERYTHROCYTE [DISTWIDTH] IN BLOOD BY AUTOMATED COUNT: 40.2 FL (ref 35.9–50)
GLOBULIN SER CALC-MCNC: 3.4 G/DL (ref 1.9–3.5)
GLUCOSE SERPL-MCNC: 114 MG/DL (ref 65–99)
GLUCOSE UR STRIP.AUTO-MCNC: NEGATIVE MG/DL
HCT VFR BLD AUTO: 45.9 % (ref 37–47)
HGB BLD-MCNC: 15.7 G/DL (ref 12–16)
IMM GRANULOCYTES # BLD AUTO: 0.03 K/UL (ref 0–0.11)
IMM GRANULOCYTES NFR BLD AUTO: 0.4 % (ref 0–0.9)
KETONES UR STRIP.AUTO-MCNC: ABNORMAL MG/DL
LEUKOCYTE ESTERASE UR QL STRIP.AUTO: NEGATIVE
LYMPHOCYTES # BLD AUTO: 1.33 K/UL (ref 1–4.8)
LYMPHOCYTES NFR BLD: 16.9 % (ref 22–41)
MCH RBC QN AUTO: 31.5 PG (ref 27–33)
MCHC RBC AUTO-ENTMCNC: 34.2 G/DL (ref 33.6–35)
MCV RBC AUTO: 92 FL (ref 81.4–97.8)
MICRO URNS: ABNORMAL
MONOCYTES # BLD AUTO: 0.54 K/UL (ref 0–0.85)
MONOCYTES NFR BLD AUTO: 6.9 % (ref 0–13.4)
MUCOUS THREADS #/AREA URNS HPF: ABNORMAL /HPF
NEUTROPHILS # BLD AUTO: 5.86 K/UL (ref 2–7.15)
NEUTROPHILS NFR BLD: 74.6 % (ref 44–72)
NITRITE UR QL STRIP.AUTO: NEGATIVE
NRBC # BLD AUTO: 0 K/UL
NRBC BLD-RTO: 0 /100 WBC
PH UR STRIP.AUTO: 6 [PH]
PLATELET # BLD AUTO: 253 K/UL (ref 164–446)
PMV BLD AUTO: 10.5 FL (ref 9–12.9)
POTASSIUM SERPL-SCNC: 4.1 MMOL/L (ref 3.6–5.5)
PROT SERPL-MCNC: 8.2 G/DL (ref 6–8.2)
PROT UR QL STRIP: NEGATIVE MG/DL
RBC # BLD AUTO: 4.99 M/UL (ref 4.2–5.4)
RBC # URNS HPF: ABNORMAL /HPF
RBC UR QL AUTO: ABNORMAL
SODIUM SERPL-SCNC: 139 MMOL/L (ref 135–145)
SP GR UR STRIP.AUTO: <=1.005
TROPONIN I SERPL-MCNC: <0.02 NG/ML (ref 0–0.04)
WBC # BLD AUTO: 7.9 K/UL (ref 4.8–10.8)
WBC #/AREA URNS HPF: ABNORMAL /HPF

## 2018-01-08 PROCEDURE — 84484 ASSAY OF TROPONIN QUANT: CPT

## 2018-01-08 PROCEDURE — 87086 URINE CULTURE/COLONY COUNT: CPT

## 2018-01-08 PROCEDURE — 99284 EMERGENCY DEPT VISIT MOD MDM: CPT

## 2018-01-08 PROCEDURE — 80053 COMPREHEN METABOLIC PANEL: CPT

## 2018-01-08 PROCEDURE — 70450 CT HEAD/BRAIN W/O DYE: CPT

## 2018-01-08 PROCEDURE — A9270 NON-COVERED ITEM OR SERVICE: HCPCS | Performed by: EMERGENCY MEDICINE

## 2018-01-08 PROCEDURE — 700102 HCHG RX REV CODE 250 W/ 637 OVERRIDE(OP): Performed by: EMERGENCY MEDICINE

## 2018-01-08 PROCEDURE — 81001 URINALYSIS AUTO W/SCOPE: CPT

## 2018-01-08 PROCEDURE — 93005 ELECTROCARDIOGRAM TRACING: CPT

## 2018-01-08 PROCEDURE — 85025 COMPLETE CBC W/AUTO DIFF WBC: CPT

## 2018-01-08 RX ORDER — MECLIZINE HYDROCHLORIDE 25 MG/1
25 TABLET ORAL 3 TIMES DAILY PRN
Qty: 30 TAB | Refills: 0 | Status: SHIPPED | OUTPATIENT
Start: 2018-01-08 | End: 2018-01-17

## 2018-01-08 RX ORDER — ACETAMINOPHEN 500 MG
500-1000 TABLET ORAL EVERY 6 HOURS PRN
Status: SHIPPED | COMMUNITY
End: 2021-05-05

## 2018-01-08 RX ORDER — ZOLPIDEM TARTRATE 10 MG/1
10 TABLET ORAL NIGHTLY PRN
Status: SHIPPED | COMMUNITY
End: 2018-07-11 | Stop reason: SDUPTHER

## 2018-01-08 RX ORDER — M-VIT,TX,IRON,MINS/CALC/FOLIC 27MG-0.4MG
1 TABLET ORAL DAILY
Status: SHIPPED | COMMUNITY
End: 2019-04-02

## 2018-01-08 RX ORDER — LORAZEPAM 1 MG/1
0.5 TABLET ORAL ONCE
Status: COMPLETED | OUTPATIENT
Start: 2018-01-08 | End: 2018-01-08

## 2018-01-08 RX ORDER — MECLIZINE HYDROCHLORIDE 25 MG/1
25 TABLET ORAL 3 TIMES DAILY PRN
Status: SHIPPED | COMMUNITY
End: 2018-11-21

## 2018-01-08 RX ORDER — MECLIZINE HYDROCHLORIDE 25 MG/1
25 TABLET ORAL ONCE
Status: COMPLETED | OUTPATIENT
Start: 2018-01-08 | End: 2018-01-08

## 2018-01-08 RX ORDER — KETOTIFEN FUMARATE 0.25 MG/ML
1 SOLUTION/ DROPS OPHTHALMIC 2 TIMES DAILY PRN
Status: SHIPPED | COMMUNITY
End: 2018-12-31

## 2018-01-08 RX ADMIN — LORAZEPAM 0.5 MG: 1 TABLET ORAL at 10:55

## 2018-01-08 RX ADMIN — MECLIZINE HYDROCHLORIDE 25 MG: 25 TABLET ORAL at 12:24

## 2018-01-08 ASSESSMENT — PAIN SCALES - GENERAL: PAINLEVEL_OUTOF10: 6

## 2018-01-08 NOTE — DISCHARGE INSTRUCTIONS
Dizziness  Dizziness is a common problem. It is a feeling of unsteadiness or light-headedness. You may feel like you are about to faint. Dizziness can lead to injury if you stumble or fall. Anyone can become dizzy, but dizziness is more common in older adults. This condition can be caused by a number of things, including medicines, dehydration, or illness.  HOME CARE INSTRUCTIONS  Taking these steps may help with your condition:  Eating and Drinking  · Drink enough fluid to keep your urine clear or pale yellow. This helps to keep you from becoming dehydrated. Try to drink more clear fluids, such as water.  · Do not drink alcohol.  · Limit your caffeine intake if directed by your health care provider.  · Limit your salt intake if directed by your health care provider.  Activity  · Avoid making quick movements.  ¨ Rise slowly from chairs and steady yourself until you feel okay.  ¨ In the morning, first sit up on the side of the bed. When you feel okay, stand slowly while you hold onto something until you know that your balance is fine.  · Move your legs often if you need to  one place for a long time. Tighten and relax your muscles in your legs while you are standing.  · Do not drive or operate heavy machinery if you feel dizzy.  · Avoid bending down if you feel dizzy. Place items in your home so that they are easy for you to reach without leaning over.  Lifestyle  · Do not use any tobacco products, including cigarettes, chewing tobacco, or electronic cigarettes. If you need help quitting, ask your health care provider.  · Try to reduce your stress level, such as with yoga or meditation. Talk with your health care provider if you need help.  General Instructions  · Watch your dizziness for any changes.  · Take medicines only as directed by your health care provider. Talk with your health care provider if you think that your dizziness is caused by a medicine that you are taking.  · Tell a friend or a family  member that you are feeling dizzy. If he or she notices any changes in your behavior, have this person call your health care provider.  · Keep all follow-up visits as directed by your health care provider. This is important.  SEEK MEDICAL CARE IF:  · Your dizziness does not go away.  · Your dizziness or light-headedness gets worse.  · You feel nauseous.  · You have reduced hearing.  · You have new symptoms.  · You are unsteady on your feet or you feel like the room is spinning.  SEEK IMMEDIATE MEDICAL CARE IF:  · You vomit or have diarrhea and are unable to eat or drink anything.  · You have problems talking, walking, swallowing, or using your arms, hands, or legs.  · You feel generally weak.  · You are not thinking clearly or you have trouble forming sentences. It may take a friend or family member to notice this.  · You have chest pain, abdominal pain, shortness of breath, or sweating.  · Your vision changes.  · You notice any bleeding.  · You have a headache.  · You have neck pain or a stiff neck.  · You have a fever.     This information is not intended to replace advice given to you by your health care provider. Make sure you discuss any questions you have with your health care provider.     Document Released: 06/13/2002 Document Revised: 05/03/2016 Document Reviewed: 12/14/2015  [employer] Interactive Patient Education ©2016 [employer]      Benign Positional Vertigo  Vertigo means you feel like you or your surroundings are moving when they are not. Benign positional vertigo is the most common form of vertigo. Benign means that the cause of your condition is not serious. Benign positional vertigo is more common in older adults.  CAUSES   Benign positional vertigo is the result of an upset in the labyrinth system. This is an area in the middle ear that helps control your balance. This may be caused by a viral infection, head injury, or repetitive motion. However, often no specific cause is found.  SYMPTOMS    Symptoms of benign positional vertigo occur when you move your head or eyes in different directions. Some of the symptoms may include:  · Loss of balance and falls.  · Vomiting.  · Blurred vision.  · Dizziness.  · Nausea.  · Involuntary eye movements (nystagmus).  DIAGNOSIS   Benign positional vertigo is usually diagnosed by physical exam. If the specific cause of your benign positional vertigo is unknown, your caregiver may perform imaging tests, such as magnetic resonance imaging (MRI) or computed tomography (CT).  TREATMENT   Your caregiver may recommend movements or procedures to correct the benign positional vertigo. Medicines such as meclizine, benzodiazepines, and medicines for nausea may be used to treat your symptoms. In rare cases, if your symptoms are caused by certain conditions that affect the inner ear, you may need surgery.  HOME CARE INSTRUCTIONS   · Follow your caregiver's instructions.  · Move slowly. Do not make sudden body or head movements.  · Avoid driving.  · Avoid operating heavy machinery.  · Avoid performing any tasks that would be dangerous to you or others during a vertigo episode.  · Drink enough fluids to keep your urine clear or pale yellow.  SEEK IMMEDIATE MEDICAL CARE IF:   · You develop problems with walking, weakness, numbness, or using your arms, hands, or legs.  · You have difficulty speaking.  · You develop severe headaches.  · Your nausea or vomiting continues or gets worse.  · You develop visual changes.  · Your family or friends notice any behavioral changes.  · Your condition gets worse.  · You have a fever.  · You develop a stiff neck or sensitivity to light.  MAKE SURE YOU:   · Understand these instructions.  · Will watch your condition.  · Will get help right away if you are not doing well or get worse.     This information is not intended to replace advice given to you by your health care provider. Make sure you discuss any questions you have with your health care  provider.     Document Released: 09/25/2007 Document Revised: 03/11/2013 Document Reviewed: 04/11/2016  [employer] Interactive Patient Education ©2016 [employer]

## 2018-01-08 NOTE — ED NOTES
Chief Complaint   Patient presents with   • Dizziness     head pressure with a hx of vertigo, 2 days

## 2018-01-08 NOTE — ED PROVIDER NOTES
ED Provider Note    CHIEF COMPLAINT  Chief Complaint   Patient presents with   • Dizziness     head pressure with a hx of vertigo, 2 days       HPI  Lurdes Ramon is a 62 y.o. female Here for evaluation of dizziness. The patient states that she's had this multiple times in the past, but admits to history of vertigo. She states that on this particular occasion over the last 2 days, she has had increasing headache. She describes a gradual onset, and not the worse headache of her life. She has no paresthesias, no weakness, and no difficulty walking. She states that yesterday while sitting at her computer she became very lightheaded and had to lay down. She felt much better after laying down,     PAST MEDICAL HISTORY   has a past medical history of Back pain and Insomnia.    SOCIAL HISTORY  Social History     Social History Main Topics   • Smoking status: Former Smoker     Packs/day: 1.00     Years: 15.00     Types: Cigarettes     Quit date: 10/26/1996   • Smokeless tobacco: Never Used   • Alcohol use 0.0 oz/week      Comment: socially   • Drug use: No   • Sexual activity: Not on file       SURGICAL HISTORY   has a past surgical history that includes hysterectomy, total abdominal (1994); other orthopedic surgery; dental extraction(s); and lithotripsy.    CURRENT MEDICATIONS  Home Medications     Reviewed by Dianne Suh (Pharmacy Tech) on 01/08/18 at 1014  Med List Status: Complete   Medication Last Dose Status   acetaminophen (TYLENOL) 500 MG Tab 1/7/2018 Active   alprazolam (XANAX) 0.5 MG Tab 1/6/2018 Active   DimenhyDRINATE (DRAMAMINE PO) 1/5/2018 Active   estradiol (VIVELLE DOT) 0.1 MG/24HR PATCH BIWEEKLY 1/8/2018 Active   ketotifen (ZADITOR) 0.025 % ophthalmic solution > 4 days Active   meclizine (ANTIVERT) 25 MG Tab 1/7/2018 Active   therapeutic multivitamin-minerals (THERAGRAN-M) Tab 1/7/2018 Active   zolpidem (AMBIEN) 10 MG Tab 1/3/2018 Active                ALLERGIES  Allergies   Allergen  Reactions   • Pcn [Penicillins] Hives   • Sulfa Drugs Hives       REVIEW OF SYSTEMS  See HPI for further details. Review of systems as above, otherwise all other systems are negative.     PHYSICAL EXAM  Constitutional: Well developed, well nourished. No acute distress.  HEENT: Normocephalic, atraumatic. Posterior pharynx clear and moist.  Eyes:  EOMI. Normal sclera.  Neck: Supple, Full range of motion, nontender.  Chest/Pulmonary: clear to ausculation. Symmetrical expansion.   Cardio: Regular rate and rhythm with no murmur.   Abdomen: Soft, nontender. No peritoneal signs. No guarding. No palpable masses.  Back: No CVA tenderness, nontender midline, no step offs.  Musculoskeletal: No deformity, no edema, neurovascular intact.   Neuro: Clear speech, appropriate, cooperative, cranial nerves II-XII grossly intact.  Psych: Normal mood and affect    Results for orders placed or performed during the hospital encounter of 01/08/18   COMP METABOLIC PANEL   Result Value Ref Range    Sodium 139 135 - 145 mmol/L    Potassium 4.1 3.6 - 5.5 mmol/L    Chloride 103 96 - 112 mmol/L    Co2 25 20 - 33 mmol/L    Anion Gap 11.0 0.0 - 11.9    Glucose 114 (H) 65 - 99 mg/dL    Bun 13 8 - 22 mg/dL    Creatinine 0.72 0.50 - 1.40 mg/dL    Calcium 9.8 8.4 - 10.2 mg/dL    AST(SGOT) 27 12 - 45 U/L    ALT(SGPT) 25 2 - 50 U/L    Alkaline Phosphatase 82 30 - 99 U/L    Total Bilirubin 1.0 0.1 - 1.5 mg/dL    Albumin 4.8 3.2 - 4.9 g/dL    Total Protein 8.2 6.0 - 8.2 g/dL    Globulin 3.4 1.9 - 3.5 g/dL    A-G Ratio 1.4 g/dL   CBC WITH DIFFERENTIAL   Result Value Ref Range    WBC 7.9 4.8 - 10.8 K/uL    RBC 4.99 4.20 - 5.40 M/uL    Hemoglobin 15.7 12.0 - 16.0 g/dL    Hematocrit 45.9 37.0 - 47.0 %    MCV 92.0 81.4 - 97.8 fL    MCH 31.5 27.0 - 33.0 pg    MCHC 34.2 33.6 - 35.0 g/dL    RDW 40.2 35.9 - 50.0 fL    Platelet Count 253 164 - 446 K/uL    MPV 10.5 9.0 - 12.9 fL    Neutrophils-Polys 74.60 (H) 44.00 - 72.00 %    Lymphocytes 16.90 (L) 22.00 - 41.00 %     Monocytes 6.90 0.00 - 13.40 %    Eosinophils 0.40 0.00 - 6.90 %    Basophils 0.80 0.00 - 1.80 %    Immature Granulocytes 0.40 0.00 - 0.90 %    Nucleated RBC 0.00 /100 WBC    Neutrophils (Absolute) 5.86 2.00 - 7.15 K/uL    Lymphs (Absolute) 1.33 1.00 - 4.80 K/uL    Monos (Absolute) 0.54 0.00 - 0.85 K/uL    Eos (Absolute) 0.03 0.00 - 0.51 K/uL    Baso (Absolute) 0.06 0.00 - 0.12 K/uL    Immature Granulocytes (abs) 0.03 0.00 - 0.11 K/uL    NRBC (Absolute) 0.00 K/uL   TROPONIN   Result Value Ref Range    Troponin I <0.02 0.00 - 0.04 ng/mL   URINALYSIS,CULTURE IF INDICATED   Result Value Ref Range    Color Yellow     Character Clear     Specific Gravity <=1.005 <1.035    Ph 6.0 5.0 - 8.0    Glucose Negative Negative mg/dL    Ketones Trace (A) Negative mg/dL    Protein Negative Negative mg/dL    Bilirubin Negative Negative    Nitrite Negative Negative    Leukocyte Esterase Negative Negative    Occult Blood Small (A) Negative    Micro Urine Req Microscopic     Culture Indicated Yes UA Culture   URINE MICROSCOPIC (W/UA)   Result Value Ref Range    WBC 0-2 /hpf    RBC 0-2 /hpf    Bacteria Moderate (A) None /hpf    Epithelial Cells Few Few /hpf    Mucous Threads Few /hpf   ESTIMATED GFR   Result Value Ref Range    GFR If African American >60 >60 mL/min/1.73 m 2    GFR If Non African American >60 >60 mL/min/1.73 m 2     CT-HEAD W/O   Final Result      No evidence of acute intracranial process.            PROCEDURES     MEDICAL RECORD  I have reviewed patient's medical record and pertinent results are listed above.    COURSE & MEDICAL DECISION MAKING  I have reviewed any medical record information, laboratory studies and radiographic results as noted above.    12:44 PM  The patient is nontoxic-appearing, has no symptoms at this time, and states she feels much better. She will follow-up, and will return here for any further issues or concerns.    If you have had any blood pressure issues while here in the emergency  department, please see your doctor for a further evaluation or work up.    Differential diagnoses include but not limited to: Vertigo, subarachnoid, subdural,    This patient presents with vertigo .  At this time, I have counseled the patient/family regarding their medications, pain control, and follow up.  They will continue their medications, if any, as prescribed.  They will return immediately for any worsening symptoms and/or any other medical concerns.  They will see their doctor, or contact the doctor provided, in 1-2 days for follow up.       FINAL IMPRESSION  1. Vertigo            Electronically signed by: Elijah Segura, 1/8/2018 11:18 AM

## 2018-01-10 LAB
BACTERIA UR CULT: NORMAL
SIGNIFICANT IND 70042: NORMAL
SITE SITE: NORMAL
SOURCE SOURCE: NORMAL

## 2018-01-17 ENCOUNTER — OFFICE VISIT (OUTPATIENT)
Dept: MEDICAL GROUP | Facility: PHYSICIAN GROUP | Age: 63
End: 2018-01-17
Payer: COMMERCIAL

## 2018-01-17 VITALS
WEIGHT: 177 LBS | TEMPERATURE: 97.8 F | RESPIRATION RATE: 16 BRPM | SYSTOLIC BLOOD PRESSURE: 118 MMHG | DIASTOLIC BLOOD PRESSURE: 80 MMHG | HEART RATE: 92 BPM | BODY MASS INDEX: 29.49 KG/M2 | HEIGHT: 65 IN | OXYGEN SATURATION: 92 %

## 2018-01-17 DIAGNOSIS — Z23 NEED FOR VACCINATION: ICD-10-CM

## 2018-01-17 DIAGNOSIS — H81.10 BENIGN PAROXYSMAL POSITIONAL VERTIGO, UNSPECIFIED LATERALITY: ICD-10-CM

## 2018-01-17 DIAGNOSIS — R42 VERTIGO: ICD-10-CM

## 2018-01-17 DIAGNOSIS — G47.00 INSOMNIA, UNSPECIFIED TYPE: ICD-10-CM

## 2018-01-17 PROBLEM — Z00.00 ENCOUNTER FOR WELLNESS EXAMINATION: Status: RESOLVED | Noted: 2017-04-04 | Resolved: 2018-01-17

## 2018-01-17 PROBLEM — Z12.39 SCREENING FOR BREAST CANCER: Status: RESOLVED | Noted: 2017-04-04 | Resolved: 2018-01-17

## 2018-01-17 PROCEDURE — 99214 OFFICE O/P EST MOD 30 MIN: CPT | Mod: 25 | Performed by: NURSE PRACTITIONER

## 2018-01-17 PROCEDURE — 90471 IMMUNIZATION ADMIN: CPT | Performed by: NURSE PRACTITIONER

## 2018-01-17 PROCEDURE — 90686 IIV4 VACC NO PRSV 0.5 ML IM: CPT | Performed by: NURSE PRACTITIONER

## 2018-01-17 ASSESSMENT — PATIENT HEALTH QUESTIONNAIRE - PHQ9: CLINICAL INTERPRETATION OF PHQ2 SCORE: 0

## 2018-01-17 NOTE — PROGRESS NOTES
Chief Complaint   Patient presents with   • Hospital Follow-up       HISTORY OF PRESENT ILLNESS: Patient is a 62 y.o. female established patient who presents today to discuss the following issues:    Need for vaccination  Would like a flu shot today.      Vertigo  Went to the ER for vertigo about 1 week ago.  Has had episodes in the past, and was given exercises by ENT. She tried the exercises, as well as the Epley maneuver which she learned online.  Her symptoms gradually resolved, but returned.  She also stopped ambien abruptly, which may have contributed to her symptoms.  She was given a prescription for meclizine, and she would like a referral to ENT.    Insomnia  Ambien works well for her, but stopped it abruptly because she thought it might have been causing some of her vertigo symptoms.      Patient Active Problem List    Diagnosis Date Noted   • Vertigo 01/17/2018   • Need for vaccination 01/17/2018   • Hormone replacement therapy (HRT) 01/25/2017   • Insomnia 10/26/2016   • Low back pain 10/26/2016   • Anxiety 10/26/2016       Allergies:Pcn [penicillins] and Sulfa drugs    Current Outpatient Prescriptions   Medication Sig Dispense Refill   • ketotifen (ZADITOR) 0.025 % ophthalmic solution Place 1 Drop in both eyes 2 times a day as needed (For allergies).     • zolpidem (AMBIEN) 10 MG Tab Take 10 mg by mouth at bedtime as needed for Sleep.     • therapeutic multivitamin-minerals (THERAGRAN-M) Tab Take 1 Tab by mouth every day.     • meclizine (ANTIVERT) 25 MG Tab Take 25 mg by mouth 3 times a day as needed for Vertigo.     • DimenhyDRINATE (DRAMAMINE PO) Take 0.5 Tabs by mouth as needed (For vertigo).     • acetaminophen (TYLENOL) 500 MG Tab Take 500-1,000 mg by mouth every 6 hours as needed for Moderate Pain.     • estradiol (VIVELLE DOT) 0.1 MG/24HR PATCH BIWEEKLY APPLY 1 PATCH EXTERNALLY TO THE SKIN 2 TIMES A WEEK AS DIRECTED 24 Patch 1     No current facility-administered medications for this visit.   "      Social History   Substance Use Topics   • Smoking status: Former Smoker     Packs/day: 1.00     Years: 15.00     Types: Cigarettes     Quit date: 10/26/1996   • Smokeless tobacco: Never Used   • Alcohol use 0.0 oz/week      Comment: socially       Family Status   Relation Status   • Sister    • Father    • Brother    • Mother      Family History   Problem Relation Age of Onset   • Cancer Sister 58     colorectal   • Heart Attack Father    • Heart Disease Brother 61   • Cancer Mother      brain       Review of Systems:   Constitutional: Negative for fever, chills, weight loss and malaise/fatigue.   HENT: Negative for ear pain, nosebleeds, congestion, sore throat and neck pain.  Positive for resolved vertigo.  Eyes: Negative for blurred vision.   Respiratory: Negative for cough, sputum production, shortness of breath and wheezing.    Cardiovascular: Negative for chest pain, palpitations, orthopnea and leg swelling.   Gastrointestinal: Negative for heartburn, nausea, vomiting and abdominal pain.   Genitourinary: Negative for dysuria, urgency and frequency.   Musculoskeletal: Negative for myalgias, joint pain, and back pain.  Skin: Negative for rash and itching.   Neurological: Negative for dizziness, tingling, tremors, sensory change, focal weakness and headaches.   Endo/Heme/Allergies: Does not bruise/bleed easily.   Psychiatric/Behavioral: Negative for depression, suicidal ideas and memory loss.  The patient is not nervous/anxious and does not have insomnia.    All other systems reviewed and are negative except as in HPI.    Exam:  Blood pressure 118/80, pulse 92, temperature 36.6 °C (97.8 °F), resp. rate 16, height 1.651 m (5' 5\"), weight 80.3 kg (177 lb), SpO2 92 %.  General:  Well nourished, well developed female in NAD  Head: Grossly normal.  Neck: Supple without JVD or bruit. Thyroid is not enlarged.  Pulmonary: Clear to ausculation. Normal effort. No rales, ronchi, or wheezing.  Cardiovascular: Regular " rate and rhythm without murmur.   Abdomen:  Soft, nontender, nondistended.  Extremities: No clubbing, cyanosis, or edema.  Skin: Intact with no obvious rashes or lesions.  Neuro: Grossly intact.  Psych: Alert and oriented x 3.  Mood and affect appropriate.    Medical decision-making and discussion: Lurdes is here today to discuss a few issues.  She was referred to ENT, and she was given a flu shot.  She will follow-up here as needed.    I have placed the below orders and discussed them with an approved delegating provider. The MA is performing the below orders under the direction of Dr. Gallardo, who have provided verbal consent for supervision.            Assessment/Plan:  1. Need for vaccination  INFLUENZA VACCINE QUAD INJ >3Y(PF)   2. Vertigo     3. Benign paroxysmal positional vertigo, unspecified laterality  REFERRAL TO ENT   4. Insomnia, unspecified type         Return if symptoms worsen or fail to improve.    Please note that this dictation was created using voice recognition software. I have made every reasonable attempt to correct obvious errors, but I expect that there are errors of grammar and possibly content that I did not discover before finalizing the note.

## 2018-01-17 NOTE — ASSESSMENT & PLAN NOTE
Went to the ER for vertigo about 1 week ago.  Has had episodes in the past, and was given exercises by ENT. She tried the exercises, as well as the Epley maneuver which she learned online.  Her symptoms gradually resolved, but returned.  She also stopped ambien abruptly, which may have contributed to her symptoms.  She was given a prescription for meclizine, and she would like a referral to ENT.

## 2018-01-17 NOTE — ASSESSMENT & PLAN NOTE
Ambien works well for her, but stopped it abruptly because she thought it might have been causing some of her vertigo symptoms.

## 2018-03-04 ENCOUNTER — OFFICE VISIT (OUTPATIENT)
Dept: URGENT CARE | Facility: PHYSICIAN GROUP | Age: 63
End: 2018-03-04
Payer: COMMERCIAL

## 2018-03-04 VITALS
HEIGHT: 65 IN | HEART RATE: 82 BPM | TEMPERATURE: 97.4 F | BODY MASS INDEX: 29.99 KG/M2 | DIASTOLIC BLOOD PRESSURE: 80 MMHG | RESPIRATION RATE: 16 BRPM | OXYGEN SATURATION: 95 % | SYSTOLIC BLOOD PRESSURE: 110 MMHG | WEIGHT: 180 LBS

## 2018-03-04 DIAGNOSIS — M54.42 ACUTE LEFT-SIDED LOW BACK PAIN WITH LEFT-SIDED SCIATICA: ICD-10-CM

## 2018-03-04 PROCEDURE — 99214 OFFICE O/P EST MOD 30 MIN: CPT | Performed by: FAMILY MEDICINE

## 2018-03-04 RX ORDER — MELOXICAM 15 MG/1
15 TABLET ORAL DAILY
Qty: 30 TAB | Refills: 0 | Status: SHIPPED | OUTPATIENT
Start: 2018-03-04 | End: 2018-08-28

## 2018-03-04 RX ORDER — HYDROCODONE BITARTRATE AND ACETAMINOPHEN 5; 300 MG/1; MG/1
TABLET ORAL
COMMUNITY
End: 2018-08-28 | Stop reason: SDUPTHER

## 2018-03-04 RX ORDER — CYCLOBENZAPRINE HCL 10 MG
10 TABLET ORAL
Qty: 15 TAB | Refills: 0 | Status: SHIPPED | OUTPATIENT
Start: 2018-03-04 | End: 2018-12-31

## 2018-03-04 ASSESSMENT — ENCOUNTER SYMPTOMS
PARESIS: 0
NUMBNESS: 0
BACK PAIN: 1
LEG PAIN: 0
TINGLING: 0
WEAKNESS: 0
BOWEL INCONTINENCE: 0
PERIANAL NUMBNESS: 0

## 2018-03-04 ASSESSMENT — PAIN SCALES - GENERAL: PAINLEVEL: 9=SEVERE PAIN

## 2018-03-04 NOTE — PATIENT INSTRUCTIONS
Back Pain, Adult  Introduction  Back pain is very common. The pain often gets better over time. The cause of back pain is usually not dangerous. Most people can learn to manage their back pain on their own.  Follow these instructions at home:  Watch your back pain for any changes. The following actions may help to lessen any pain you are feeling:  · Stay active. Start with short walks on flat ground if you can. Try to walk farther each day.  · Exercise regularly as told by your doctor. Exercise helps your back heal faster. It also helps avoid future injury by keeping your muscles strong and flexible.  · Do not sit, drive, or  one place for more than 30 minutes.  · Do not stay in bed. Resting more than 1-2 days can slow down your recovery.  · Be careful when you bend or lift an object. Use good form when lifting:  ¨ Bend at your knees.  ¨ Keep the object close to your body.  ¨ Do not twist.  · Sleep on a firm mattress. Lie on your side, and bend your knees. If you lie on your back, put a pillow under your knees.  · Take medicines only as told by your doctor.  · Put ice on the injured area.  ¨ Put ice in a plastic bag.  ¨ Place a towel between your skin and the bag.  ¨ Leave the ice on for 20 minutes, 2-3 times a day for the first 2-3 days. After that, you can switch between ice and heat packs.  · Avoid feeling anxious or stressed. Find good ways to deal with stress, such as exercise.  · Maintain a healthy weight. Extra weight puts stress on your back.  Contact a doctor if:  · You have pain that does not go away with rest or medicine.  · You have worsening pain that goes down into your legs or buttocks.  · You have pain that does not get better in one week.  · You have pain at night.  · You lose weight.  · You have a fever or chills.  Get help right away if:  · You cannot control when you poop (bowel movement) or pee (urinate).  · Your arms or legs feel weak.  · Your arms or legs lose feeling  (numbness).  · You feel sick to your stomach (nauseous) or throw up (vomit).  · You have belly (abdominal) pain.  · You feel like you may pass out (faint).  This information is not intended to replace advice given to you by your health care provider. Make sure you discuss any questions you have with your health care provider.  Document Released: 06/05/2009 Document Revised: 05/25/2017 Document Reviewed: 04/21/2015  © 2017 [employer]

## 2018-03-05 NOTE — PROGRESS NOTES
"Subjective:   Lurdes Ramon is a 62 y.o. female who presents for Back Pain        Back Pain   This is a new problem. The current episode started in the past 7 days. The problem occurs constantly. The problem has been gradually worsening since onset. The pain is present in the lumbar spine. The quality of the pain is described as aching. The pain does not radiate. The pain is moderate. Pertinent negatives include no bladder incontinence, bowel incontinence, leg pain, numbness, paresis, perianal numbness, tingling or weakness.     Review of Systems   Gastrointestinal: Negative for bowel incontinence.   Genitourinary: Negative for bladder incontinence.   Musculoskeletal: Positive for back pain.   Neurological: Negative for tingling, weakness and numbness.     Allergies   Allergen Reactions   • Pcn [Penicillins] Hives   • Sulfa Drugs Hives      Objective:   /80   Pulse 82   Temp 36.3 °C (97.4 °F)   Resp 16   Ht 1.651 m (5' 5\")   Wt 81.6 kg (180 lb)   SpO2 95%   BMI 29.95 kg/m²   Physical Exam   Constitutional: She is oriented to person, place, and time. She appears well-developed and well-nourished. No distress.   HENT:   Head: Normocephalic and atraumatic.   Eyes: Conjunctivae and EOM are normal. Pupils are equal, round, and reactive to light.   Cardiovascular: Normal rate, regular rhythm, normal heart sounds and intact distal pulses.    No murmur heard.  Pulmonary/Chest: Effort normal and breath sounds normal. No respiratory distress.   Abdominal: Soft. Bowel sounds are normal. She exhibits no distension. There is no tenderness.   Musculoskeletal:        Lumbar back: She exhibits decreased range of motion, tenderness and spasm. She exhibits no bony tenderness and no deformity.   Neurological: She is alert and oriented to person, place, and time. She has normal reflexes. No sensory deficit.   Skin: Skin is warm and dry.   Psychiatric: She has a normal mood and affect. Her behavior is normal.       "   Assessment/Plan:   Assessment    1. Acute left-sided low back pain with left-sided sciatica  - meloxicam (MOBIC) 15 MG tablet; Take 1 Tab by mouth every day.  Dispense: 30 Tab; Refill: 0  - cyclobenzaprine (FLEXERIL) 10 MG Tab; Take 1 Tab by mouth at bedtime as needed.  Dispense: 15 Tab; Refill: 0  Differential diagnosis, natural history, supportive care, and indications for immediate follow-up discussed.

## 2018-04-03 DIAGNOSIS — F41.9 ANXIETY: ICD-10-CM

## 2018-04-03 RX ORDER — ALPRAZOLAM 0.5 MG/1
0.5 TABLET ORAL NIGHTLY PRN
Qty: 30 TAB | Refills: 1 | Status: SHIPPED
Start: 2018-04-03 | End: 2018-05-03

## 2018-04-13 ENCOUNTER — PATIENT MESSAGE (OUTPATIENT)
Dept: MEDICAL GROUP | Facility: PHYSICIAN GROUP | Age: 63
End: 2018-04-13

## 2018-04-26 ENCOUNTER — HOSPITAL ENCOUNTER (OUTPATIENT)
Dept: LAB | Facility: MEDICAL CENTER | Age: 63
End: 2018-04-26
Attending: NURSE PRACTITIONER
Payer: COMMERCIAL

## 2018-04-26 ENCOUNTER — OFFICE VISIT (OUTPATIENT)
Dept: MEDICAL GROUP | Facility: PHYSICIAN GROUP | Age: 63
End: 2018-04-26
Payer: COMMERCIAL

## 2018-04-26 VITALS
WEIGHT: 178 LBS | HEIGHT: 65 IN | RESPIRATION RATE: 16 BRPM | SYSTOLIC BLOOD PRESSURE: 128 MMHG | HEART RATE: 84 BPM | BODY MASS INDEX: 29.66 KG/M2 | DIASTOLIC BLOOD PRESSURE: 80 MMHG | OXYGEN SATURATION: 96 % | TEMPERATURE: 98.6 F

## 2018-04-26 DIAGNOSIS — R42 VERTIGO: ICD-10-CM

## 2018-04-26 DIAGNOSIS — Z00.00 ENCOUNTER FOR WELLNESS EXAMINATION: ICD-10-CM

## 2018-04-26 DIAGNOSIS — G47.00 INSOMNIA, UNSPECIFIED TYPE: ICD-10-CM

## 2018-04-26 PROBLEM — Z23 NEED FOR VACCINATION: Status: RESOLVED | Noted: 2018-01-17 | Resolved: 2018-04-26

## 2018-04-26 LAB
25(OH)D3 SERPL-MCNC: 24 NG/ML (ref 30–100)
CHOLEST SERPL-MCNC: 190 MG/DL (ref 100–199)
HDLC SERPL-MCNC: 55 MG/DL
LDLC SERPL CALC-MCNC: 119 MG/DL
TRIGL SERPL-MCNC: 81 MG/DL (ref 0–149)
TSH SERPL DL<=0.005 MIU/L-ACNC: 1.54 UIU/ML (ref 0.38–5.33)

## 2018-04-26 PROCEDURE — 80061 LIPID PANEL: CPT

## 2018-04-26 PROCEDURE — 82306 VITAMIN D 25 HYDROXY: CPT

## 2018-04-26 PROCEDURE — 84443 ASSAY THYROID STIM HORMONE: CPT

## 2018-04-26 PROCEDURE — 36415 COLL VENOUS BLD VENIPUNCTURE: CPT

## 2018-04-26 PROCEDURE — 99396 PREV VISIT EST AGE 40-64: CPT | Performed by: NURSE PRACTITIONER

## 2018-04-26 RX ORDER — ERGOCALCIFEROL 1.25 MG/1
CAPSULE ORAL
Qty: 16 CAP | Refills: 0 | Status: SHIPPED | OUTPATIENT
Start: 2018-04-26 | End: 2018-08-28

## 2018-04-26 NOTE — ASSESSMENT & PLAN NOTE
Patient is here for her annual wellness visit.  Had labs done in January and a DEXA scan last year.  She has a mammogram scheduled for next month, and she does npt need paps because she had a complete hysterectomy.  Her last colonoscopy was 9 years ago, so she will be due next year.  She is up to date on all health maintenance otherwise.

## 2018-04-26 NOTE — ASSESSMENT & PLAN NOTE
Was seen by ENT and had the Epley maneuver.  Every once in a while she feels subtle symptoms and she does the Epley maneuver at home.

## 2018-04-26 NOTE — PROGRESS NOTES
Chief Complaint   Patient presents with   • Annual Exam       HISTORY OF PRESENT ILLNESS: Patient is a 63 y.o. female established patient who presents today to discuss the following issues:    Encounter for wellness examination  Patient is here for her annual wellness visit.  Had labs done in January and a DEXA scan last year.  She has a mammogram scheduled for next month, and she does npt need paps because she had a complete hysterectomy.  Her last colonoscopy was 9 years ago, so she will be due next year.  She is up to date on all health maintenance otherwise.      Vertigo  Was seen by ENT and had the Epley maneuver.  Every once in a while she feels subtle symptoms and she does the Epley maneuver at home.    Insomnia  Takes ambien rarely or xanax as needed.      Patient Active Problem List    Diagnosis Date Noted   • Vertigo 01/17/2018   • Encounter for wellness examination 04/04/2017   • Hormone replacement therapy (HRT) 01/25/2017   • Insomnia 10/26/2016   • Low back pain 10/26/2016   • Anxiety 10/26/2016       Allergies:Pcn [penicillins] and Sulfa drugs    Current Outpatient Prescriptions   Medication Sig Dispense Refill   • ALPRAZolam (XANAX) 0.5 MG Tab Take 1 Tab by mouth at bedtime as needed for Sleep for up to 30 days. 30 Tab 1   • Hydrocodone-Acetaminophen 5-300 MG Tab Take  by mouth.     • meloxicam (MOBIC) 15 MG tablet Take 1 Tab by mouth every day. 30 Tab 0   • cyclobenzaprine (FLEXERIL) 10 MG Tab Take 1 Tab by mouth at bedtime as needed. 15 Tab 0   • ketotifen (ZADITOR) 0.025 % ophthalmic solution Place 1 Drop in both eyes 2 times a day as needed (For allergies).     • zolpidem (AMBIEN) 10 MG Tab Take 10 mg by mouth at bedtime as needed for Sleep.     • therapeutic multivitamin-minerals (THERAGRAN-M) Tab Take 1 Tab by mouth every day.     • meclizine (ANTIVERT) 25 MG Tab Take 25 mg by mouth 3 times a day as needed for Vertigo.     • DimenhyDRINATE (DRAMAMINE PO) Take 0.5 Tabs by mouth as needed (For  "vertigo).     • acetaminophen (TYLENOL) 500 MG Tab Take 500-1,000 mg by mouth every 6 hours as needed for Moderate Pain.     • estradiol (VIVELLE DOT) 0.1 MG/24HR PATCH BIWEEKLY APPLY 1 PATCH EXTERNALLY TO THE SKIN 2 TIMES A WEEK AS DIRECTED 24 Patch 1     No current facility-administered medications for this visit.        Social History   Substance Use Topics   • Smoking status: Former Smoker     Packs/day: 1.00     Years: 15.00     Types: Cigarettes     Quit date: 10/26/1996   • Smokeless tobacco: Never Used   • Alcohol use 0.0 oz/week      Comment: socially       Family Status   Relation Status   • Sister    • Father    • Brother    • Mother      Family History   Problem Relation Age of Onset   • Cancer Sister 58     colorectal   • Heart Attack Father    • Heart Disease Brother 61   • Cancer Mother      brain       Review of Systems:   Constitutional: Negative for fever, chills, weight loss and malaise/fatigue.   HENT: Negative for ear pain, nosebleeds, congestion, sore throat and neck pain.    Eyes: Negative for blurred vision.   Respiratory: Negative for cough, sputum production, shortness of breath and wheezing.    Cardiovascular: Negative for chest pain, palpitations, orthopnea and leg swelling.   Gastrointestinal: Negative for heartburn, nausea, vomiting and abdominal pain.   Genitourinary: Negative for dysuria, urgency and frequency.   Musculoskeletal: Negative for myalgias, joint pain, and back pain.  Skin: Negative for rash and itching.   Neurological: Negative for dizziness, tingling, tremors, sensory change, focal weakness and headaches.   Endo/Heme/Allergies: Does not bruise/bleed easily.   Psychiatric/Behavioral: Negative for depression, suicidal ideas and memory loss.  Positive for stable insomnia.  All other systems reviewed and are negative except as in HPI.    Exam:  Blood pressure 128/80, pulse 84, temperature 37 °C (98.6 °F), resp. rate 16, height 1.651 m (5' 5\"), weight 80.7 kg (178 lb), SpO2 " 96 %.  General:  Well nourished, well developed female in NAD  Head: Grossly normal.  Neck: Supple without JVD or bruit. Thyroid is not enlarged.  Pulmonary: Clear to ausculation. Normal effort. No rales, ronchi, or wheezing.  Cardiovascular: Regular rate and rhythm without murmur.   Abdomen:  Soft, nontender, nondistended.  Extremities: No clubbing, cyanosis, or edema.  Skin: Intact with no obvious rashes or lesions.  Neuro: Grossly intact.  Psych: Alert and oriented x 3.  Mood and affect appropriate.    Medical decision-making and discussion: Lurdes is here today for her annual wellness visit.  We reviewed health maintenance guidelines and discussed past medical history.  Lab work was ordered, and she will plan to follow-up here as needed.          Assessment/Plan:  1. Encounter for wellness examination  LIPID PROFILE    VITAMIN D,25 HYDROXY    TSH   2. Vertigo     3. Insomnia, unspecified type         Return if symptoms worsen or fail to improve.    Please note that this dictation was created using voice recognition software. I have made every reasonable attempt to correct obvious errors, but I expect that there are errors of grammar and possibly content that I did not discover before finalizing the note.

## 2018-05-01 ENCOUNTER — HOSPITAL ENCOUNTER (OUTPATIENT)
Dept: RADIOLOGY | Facility: MEDICAL CENTER | Age: 63
End: 2018-05-01
Attending: NURSE PRACTITIONER
Payer: COMMERCIAL

## 2018-05-01 DIAGNOSIS — Z12.31 VISIT FOR SCREENING MAMMOGRAM: ICD-10-CM

## 2018-05-01 PROCEDURE — 77063 BREAST TOMOSYNTHESIS BI: CPT

## 2018-06-01 DIAGNOSIS — Z79.890 HORMONE REPLACEMENT THERAPY (HRT): ICD-10-CM

## 2018-06-03 RX ORDER — ESTRADIOL 0.1 MG/D
FILM, EXTENDED RELEASE TRANSDERMAL
Qty: 24 PATCH | Refills: 1 | Status: SHIPPED | OUTPATIENT
Start: 2018-06-03 | End: 2018-08-28 | Stop reason: SDUPTHER

## 2018-06-04 NOTE — TELEPHONE ENCOUNTER
Last mammo was 5/18 and normal. Last seen by PCP 4/18. Will send 6 months to pharmacy.

## 2018-07-11 DIAGNOSIS — G47.00 INSOMNIA, UNSPECIFIED TYPE: ICD-10-CM

## 2018-07-11 RX ORDER — ZOLPIDEM TARTRATE 10 MG/1
10 TABLET ORAL NIGHTLY PRN
Qty: 30 TAB | Refills: 2 | Status: SHIPPED
Start: 2018-07-11 | End: 2018-08-10

## 2018-08-28 ENCOUNTER — OFFICE VISIT (OUTPATIENT)
Dept: MEDICAL GROUP | Facility: PHYSICIAN GROUP | Age: 63
End: 2018-08-28
Payer: COMMERCIAL

## 2018-08-28 VITALS
RESPIRATION RATE: 14 BRPM | HEART RATE: 86 BPM | DIASTOLIC BLOOD PRESSURE: 84 MMHG | OXYGEN SATURATION: 96 % | TEMPERATURE: 97.2 F | SYSTOLIC BLOOD PRESSURE: 136 MMHG | WEIGHT: 173.6 LBS | BODY MASS INDEX: 28.92 KG/M2 | HEIGHT: 65 IN

## 2018-08-28 DIAGNOSIS — Z79.890 HORMONE REPLACEMENT THERAPY (HRT): ICD-10-CM

## 2018-08-28 DIAGNOSIS — M54.5 CHRONIC LOW BACK PAIN, UNSPECIFIED BACK PAIN LATERALITY, WITH SCIATICA PRESENCE UNSPECIFIED: ICD-10-CM

## 2018-08-28 DIAGNOSIS — G47.00 INSOMNIA, UNSPECIFIED TYPE: ICD-10-CM

## 2018-08-28 DIAGNOSIS — F41.9 ANXIETY: ICD-10-CM

## 2018-08-28 DIAGNOSIS — G89.29 CHRONIC LOW BACK PAIN, UNSPECIFIED BACK PAIN LATERALITY, WITH SCIATICA PRESENCE UNSPECIFIED: ICD-10-CM

## 2018-08-28 PROBLEM — Z00.00 ENCOUNTER FOR WELLNESS EXAMINATION: Status: RESOLVED | Noted: 2017-04-04 | Resolved: 2018-08-28

## 2018-08-28 PROCEDURE — 99214 OFFICE O/P EST MOD 30 MIN: CPT | Performed by: NURSE PRACTITIONER

## 2018-08-28 RX ORDER — ZOLPIDEM TARTRATE 10 MG/1
10 TABLET ORAL NIGHTLY PRN
Qty: 90 TAB | Refills: 0 | Status: SHIPPED | OUTPATIENT
Start: 2018-08-28 | End: 2018-11-21 | Stop reason: SDUPTHER

## 2018-08-28 RX ORDER — ALPRAZOLAM 0.5 MG/1
.5-1 TABLET ORAL NIGHTLY PRN
COMMUNITY
Start: 2016-07-25 | End: 2018-08-28 | Stop reason: SDUPTHER

## 2018-08-28 RX ORDER — ZOLPIDEM TARTRATE 10 MG/1
1 TABLET ORAL NIGHTLY PRN
COMMUNITY
Start: 2016-10-24 | End: 2018-08-28 | Stop reason: SDUPTHER

## 2018-08-28 RX ORDER — HYDROCODONE BITARTRATE AND ACETAMINOPHEN 5; 300 MG/1; MG/1
1 TABLET ORAL 2 TIMES DAILY PRN
Qty: 60 TAB | Refills: 0 | Status: SHIPPED | OUTPATIENT
Start: 2018-08-28 | End: 2018-09-27

## 2018-08-28 RX ORDER — ALPRAZOLAM 0.5 MG/1
0.5 TABLET ORAL NIGHTLY PRN
Qty: 90 TAB | Refills: 0 | Status: SHIPPED | OUTPATIENT
Start: 2018-08-28 | End: 2018-11-26

## 2018-08-28 RX ORDER — HYDROCODONE BITARTRATE AND ACETAMINOPHEN 5; 300 MG/1; MG/1
1-2 TABLET ORAL EVERY 4 HOURS PRN
COMMUNITY
Start: 2016-03-31 | End: 2018-12-31 | Stop reason: SDUPTHER

## 2018-08-28 RX ORDER — ESTRADIOL 0.1 MG/D
FILM, EXTENDED RELEASE TRANSDERMAL
Qty: 24 PATCH | Refills: 3 | Status: SHIPPED | OUTPATIENT
Start: 2018-08-28 | End: 2019-08-13 | Stop reason: SDUPTHER

## 2018-08-28 NOTE — ASSESSMENT & PLAN NOTE
Takes xanax for anxiety.  Would like a refill of xanax.   reviewed.  She does not take xanax if she has taken Norco.

## 2018-08-28 NOTE — PROGRESS NOTES
Chief Complaint   Patient presents with   • Insomnia     pt states that she thinks the ambien makes her dizzy;not sure   • Anxiety       HISTORY OF PRESENT ILLNESS: Patient is a 63 y.o. female established patient who presents today to discuss the following issues:    Low back pain  Has had 2 back surgeries in the past, and still has issues with acute on chronic pain at times.  She would like a refill of Norco to keep on hand for those times.   was reviewed.  Her last fill was a little over a year ago for #60 tablets.      Overall impression that this patient is benefiting from opioid therapy and that the benefits outweigh the risks of continued use. yes    - Obtained and reviewed patient utilization report from [facility] pharmacy     database on 8/28/2018 10:48 AM  prior to writing prescription for controlled         substance II, III or IV per [place] bill . Based on assessment of the          report, the prescription is medically necessary.      Insomnia  Takes ambien for sleep.  Would like a refill.  Does not take it every night.   reviewed.    Anxiety  Takes xanax for anxiety.  Would like a refill of xanax.   reviewed.  She does not take xanax if she has taken Norco.    Hormone replacement therapy (HRT)  Would like a refill of her estrogen patches.  They have been working well for her.      Patient Active Problem List    Diagnosis Date Noted   • Vertigo 01/17/2018   • Hormone replacement therapy (HRT) 01/25/2017   • Insomnia 10/26/2016   • Low back pain 10/26/2016   • Anxiety 10/26/2016       Allergies:Pcn [penicillins] and Sulfa drugs    Current Outpatient Prescriptions   Medication Sig Dispense Refill   • Hydrocodone-Acetaminophen (VICODIN) 5-300 MG Tab Take 1-2 tablet by mouth every four hours as needed.     • Hydrocodone-Acetaminophen 5-300 MG Tab Take 1 Tab by mouth 2 times a day as needed for up to 30 days. 60 Tab 0   • zolpidem (AMBIEN) 10 MG Tab Take 1 Tab by mouth at bedtime as needed  for up to 90 days. 90 Tab 0   • ALPRAZolam (XANAX) 0.5 MG Tab Take 1 Tab by mouth at bedtime as needed for up to 90 days. 90 Tab 0   • estradiol (VIVELLE DOT) 0.1 MG/24HR PATCH BIWEEKLY APPLY 1 PATCH EXTERNALLY TO THE SKIN 2 TIMES A WEEK AS DIRECTED 24 Patch 3   • cyclobenzaprine (FLEXERIL) 10 MG Tab Take 1 Tab by mouth at bedtime as needed. 15 Tab 0   • ketotifen (ZADITOR) 0.025 % ophthalmic solution Place 1 Drop in both eyes 2 times a day as needed (For allergies).     • therapeutic multivitamin-minerals (THERAGRAN-M) Tab Take 1 Tab by mouth every day.     • meclizine (ANTIVERT) 25 MG Tab Take 25 mg by mouth 3 times a day as needed for Vertigo.     • DimenhyDRINATE (DRAMAMINE PO) Take 0.5 Tabs by mouth as needed (For vertigo).     • acetaminophen (TYLENOL) 500 MG Tab Take 500-1,000 mg by mouth every 6 hours as needed for Moderate Pain.       No current facility-administered medications for this visit.        Social History   Substance Use Topics   • Smoking status: Former Smoker     Packs/day: 1.00     Years: 15.00     Types: Cigarettes     Quit date: 10/26/1996   • Smokeless tobacco: Never Used   • Alcohol use 0.0 oz/week      Comment: socially       Family Status   Relation Status   • Sis (Not Specified)   • Fa (Not Specified)   • Bro (Not Specified)   • Mo (Not Specified)     Family History   Problem Relation Age of Onset   • Cancer Sister 58        colorectal   • Heart Attack Father    • Heart Disease Brother 61   • Cancer Mother         brain       Review of Systems:   Constitutional: Negative for fever, chills, weight loss and malaise/fatigue.   HENT: Negative for ear pain, nosebleeds, congestion, sore throat and neck pain.    Eyes: Negative for blurred vision.   Respiratory: Negative for cough, sputum production, shortness of breath and wheezing.    Cardiovascular: Negative for chest pain, palpitations, orthopnea and leg swelling.   Gastrointestinal: Negative for heartburn, nausea, vomiting and abdominal  "pain.   Genitourinary: Negative for dysuria, urgency and frequency.   Musculoskeletal: Negative for myalgias and joint pain.  Positive for intermittent acute on chronic low back pain.  Skin: Negative for rash and itching.   Neurological: Negative for dizziness, tingling, tremors, sensory change, focal weakness and headaches.   Endo/Heme/Allergies: Does not bruise/bleed easily.   Psychiatric/Behavioral: Negative for depression, suicidal ideas and memory loss. Positive for stable insomnia and anxiety.  All other systems reviewed and are negative except as in HPI.    Exam:  Blood pressure 136/84, pulse 86, temperature 36.2 °C (97.2 °F), resp. rate 14, height 1.651 m (5' 5\"), weight 78.7 kg (173 lb 9.6 oz), SpO2 96 %.  General:  Well nourished, well developed female in NAD  Head: Grossly normal.  Neck: Supple without JVD or bruit. Thyroid is not enlarged.  Pulmonary: Clear to ausculation. Normal effort. No rales, ronchi, or wheezing.  Cardiovascular: Regular rate and rhythm without murmur.   Abdomen:  Soft, nontender, nondistended.  Extremities: No clubbing, cyanosis, or edema.  Skin: Intact with no obvious rashes or lesions.  Neuro: Grossly intact.  Psych: Alert and oriented x 3.  Mood and affect appropriate.    Medical decision-making and discussion: Lurdes is here today for follow-up.  Her medications were refilled.  She will follow-up here in 3 months, sooner if needed.          Assessment/Plan:  1. Hormone replacement therapy (HRT)  estradiol (VIVELLE DOT) 0.1 MG/24HR PATCH BIWEEKLY   2. Insomnia, unspecified type  zolpidem (AMBIEN) 10 MG Tab   3. Chronic low back pain, unspecified back pain laterality, with sciatica presence unspecified  Hydrocodone-Acetaminophen 5-300 MG Tab    CONSENT FOR OPIATE PRESCRIPTION   4. Anxiety  ALPRAZolam (XANAX) 0.5 MG Tab       Return if symptoms worsen or fail to improve.    Please note that this dictation was created using voice recognition software. I have made every reasonable " attempt to correct obvious errors, but I expect that there are errors of grammar and possibly content that I did not discover before finalizing the note.

## 2018-08-28 NOTE — ASSESSMENT & PLAN NOTE
Has had 2 back surgeries in the past, and still has issues with acute on chronic pain at times.  She would like a refill of Norco to keep on hand for those times.   was reviewed.  Her last fill was a little over a year ago for #60 tablets.      Overall impression that this patient is benefiting from opioid therapy and that the benefits outweigh the risks of continued use. yes    - Obtained and reviewed patient utilization report from [facility] pharmacy     database on 8/28/2018 10:48 AM  prior to writing prescription for controlled         substance II, III or IV per [place] bill . Based on assessment of the          report, the prescription is medically necessary.

## 2018-11-20 ENCOUNTER — TELEPHONE (OUTPATIENT)
Dept: MEDICAL GROUP | Facility: PHYSICIAN GROUP | Age: 63
End: 2018-11-20

## 2018-11-20 NOTE — TELEPHONE ENCOUNTER
Future Appointments       Provider Department Center    11/21/2018 8:25 AM BRITTANY Blanco [facility]        ESTABLISHED PATIENT PRE-VISIT PLANNING     Patient was NOT contacted to complete PVP.    1.  Reviewed notes from the last few office visits within the medical group: Yes    2.  If any orders were placed at last visit or intended to be done for this visit (i.e. 6 mos follow-up), do we have Results/Consult Notes?        •  Labs - Labs were not ordered at last office visit.       •  Imaging - Imaging was not ordered at last office visit.       •  Referrals - No referrals were ordered at last office visit.    3. Is this appointment scheduled as a Hospital Follow-Up? No    4.  Immunizations were updated in [employer] using Web[employer]?: Yes       •  Web [employer] Recommendations: TD and ZOSTAVAX (Shingles)    5.  Patient is due for the following Health Maintenance Topics:   Health Maintenance Due   Topic Date Due   • COLONOSCOPY  04/26/2005   • IMM ZOSTER VACCINES (1 of 2) 04/26/2005       6.  MDX printed for Provider? NO    7.  Patient was NOT informed to arrive 15 min prior to their scheduled appointment and bring in their medication bottles.

## 2018-11-21 ENCOUNTER — OFFICE VISIT (OUTPATIENT)
Dept: MEDICAL GROUP | Facility: PHYSICIAN GROUP | Age: 63
End: 2018-11-21
Payer: COMMERCIAL

## 2018-11-21 VITALS
BODY MASS INDEX: 29.32 KG/M2 | TEMPERATURE: 99.4 F | OXYGEN SATURATION: 95 % | WEIGHT: 176 LBS | HEIGHT: 65 IN | DIASTOLIC BLOOD PRESSURE: 68 MMHG | HEART RATE: 78 BPM | SYSTOLIC BLOOD PRESSURE: 114 MMHG | RESPIRATION RATE: 16 BRPM

## 2018-11-21 DIAGNOSIS — Z79.890 HORMONE REPLACEMENT THERAPY (HRT): ICD-10-CM

## 2018-11-21 DIAGNOSIS — G47.00 INSOMNIA, UNSPECIFIED TYPE: ICD-10-CM

## 2018-11-21 PROCEDURE — 99214 OFFICE O/P EST MOD 30 MIN: CPT | Performed by: NURSE PRACTITIONER

## 2018-11-21 RX ORDER — ZOLPIDEM TARTRATE 10 MG/1
10 TABLET ORAL NIGHTLY PRN
Qty: 90 TAB | Refills: 1 | Status: SHIPPED | OUTPATIENT
Start: 2018-11-21 | End: 2019-02-19

## 2018-11-21 NOTE — ASSESSMENT & PLAN NOTE
Is interested in having her hormones checked.  She will check with [facility].

## 2018-11-21 NOTE — PROGRESS NOTES
Chief Complaint   Patient presents with   • Insomnia     Med refill/Zolpidem        HISTORY OF PRESENT ILLNESS: Patient is a 63 y.o. female established patient who presents today to discuss the following issues:    Insomnia  Chronic in nature.  Stable on meds.  Due for refills of ambien.    Hormone replacement therapy (HRT)  Is interested in having her hormones checked.  She will check with [facility]       Patient Active Problem List    Diagnosis Date Noted   • Vertigo 01/17/2018   • Hormone replacement therapy (HRT) 01/25/2017   • Insomnia 10/26/2016   • Low back pain 10/26/2016   • Anxiety 10/26/2016       Allergies:Pcn [penicillins] and Sulfa drugs    Current Outpatient Prescriptions   Medication Sig Dispense Refill   • zolpidem (AMBIEN) 10 MG Tab Take 1 Tab by mouth at bedtime as needed for up to 90 days. 90 Tab 1   • Hydrocodone-Acetaminophen (VICODIN) 5-300 MG Tab Take 1-2 tablet by mouth every four hours as needed.     • ALPRAZolam (XANAX) 0.5 MG Tab Take 1 Tab by mouth at bedtime as needed for up to 90 days. 90 Tab 0   • estradiol (VIVELLE DOT) 0.1 MG/24HR PATCH BIWEEKLY APPLY 1 PATCH EXTERNALLY TO THE SKIN 2 TIMES A WEEK AS DIRECTED 24 Patch 3   • cyclobenzaprine (FLEXERIL) 10 MG Tab Take 1 Tab by mouth at bedtime as needed. 15 Tab 0   • therapeutic multivitamin-minerals (THERAGRAN-M) Tab Take 1 Tab by mouth every day.     • ketotifen (ZADITOR) 0.025 % ophthalmic solution Place 1 Drop in both eyes 2 times a day as needed (For allergies).     • DimenhyDRINATE (DRAMAMINE PO) Take 0.5 Tabs by mouth as needed (For vertigo).     • acetaminophen (TYLENOL) 500 MG Tab Take 500-1,000 mg by mouth every 6 hours as needed for Moderate Pain.       No current facility-administered medications for this visit.        Social History   Substance Use Topics   • Smoking status: Former Smoker     Packs/day: 1.00     Years: 15.00     Types: Cigarettes     Quit date: 10/26/1996   • Smokeless tobacco: Never Used   • Alcohol  "use 0.0 oz/week      Comment: socially       Family Status   Relation Status   • Sis (Not Specified)   • Fa (Not Specified)   • Bro (Not Specified)   • Mo (Not Specified)     Family History   Problem Relation Age of Onset   • Cancer Sister 58        colorectal   • Heart Attack Father    • Heart Disease Brother 61   • Cancer Mother         brain       Review of Systems:   Constitutional: Negative for fever, chills, weight loss and malaise/fatigue.   HENT: Negative for ear pain, nosebleeds, congestion, sore throat and neck pain.    Eyes: Negative for blurred vision.   Respiratory: Negative for cough, sputum production, shortness of breath and wheezing.    Cardiovascular: Negative for chest pain, palpitations, orthopnea and leg swelling.   Gastrointestinal: Negative for heartburn, nausea, vomiting and abdominal pain.   Genitourinary: Negative for dysuria, urgency and frequency.   Musculoskeletal: Negative for myalgias, joint pain, and back pain.  Skin: Negative for rash and itching.   Neurological: Negative for dizziness, tingling, tremors, sensory change, focal weakness and headaches.   Endo/Heme/Allergies: Does not bruise/bleed easily.   Psychiatric/Behavioral: Negative for depression, suicidal ideas and memory loss.  Positive for stable anxiety and insomnia.  All other systems reviewed and are negative except as in HPI.    Exam:  Blood pressure 114/68, pulse 78, temperature 37.4 °C (99.4 °F), resp. rate 16, height 1.651 m (5' 5\"), weight 79.8 kg (176 lb), SpO2 95 %.  General:  Well nourished, well developed female in NAD  Head: Grossly normal.  Neck: Supple without JVD or bruit. Thyroid is not enlarged.  Pulmonary: Clear to ausculation. Normal effort. No rales, ronchi, or wheezing.  Cardiovascular: Regular rate and rhythm without murmur.   Abdomen:  Soft, nontender, nondistended.  Extremities: No clubbing, cyanosis, or edema.  Skin: Intact with no obvious rashes or lesions.  Neuro: Grossly intact.  Psych: Alert and " oriented x 3.  Mood and affect appropriate.    Medical decision-making and discussion: Lurdes is here today for follow-up.  Her Ambien was refilled, and she will follow-up here as needed.          Assessment/Plan:  1. Insomnia, unspecified type  zolpidem (AMBIEN) 10 MG Tab   2. Hormone replacement therapy (HRT)         Return if symptoms worsen or fail to improve.    Please note that this dictation was created using voice recognition software. I have made every reasonable attempt to correct obvious errors, but I expect that there are errors of grammar and possibly content that I did not discover before finalizing the note.

## 2018-12-12 ENCOUNTER — TELEMEDICINE2 (OUTPATIENT)
Dept: URGENT CARE | Facility: PHYSICIAN GROUP | Age: 63
End: 2018-12-12

## 2018-12-12 DIAGNOSIS — H10.31 ACUTE BACTERIAL CONJUNCTIVITIS OF RIGHT EYE: ICD-10-CM

## 2018-12-12 PROCEDURE — 99214 OFFICE O/P EST MOD 30 MIN: CPT | Performed by: NURSE PRACTITIONER

## 2018-12-12 RX ORDER — TOBRAMYCIN 3 MG/ML
1 SOLUTION/ DROPS OPHTHALMIC 4 TIMES DAILY
Qty: 1 BOTTLE | Refills: 0 | Status: SHIPPED | OUTPATIENT
Start: 2018-12-12 | End: 2018-12-17

## 2018-12-12 ASSESSMENT — ENCOUNTER SYMPTOMS
COUGH: 0
HEADACHES: 0
EYE DISCHARGE: 1
DOUBLE VISION: 0
EYE REDNESS: 1
DIZZINESS: 0
FEVER: 0
SORE THROAT: 0
BLURRED VISION: 0
PHOTOPHOBIA: 0
CHILLS: 0
SINUS PAIN: 0
MYALGIAS: 0
EYE PAIN: 0
WEAKNESS: 0

## 2018-12-12 NOTE — PROGRESS NOTES
Subjective:      Lurdes aRmon is a 63 y.o. female who presents with Conjunctivitis (R eye has been blood shot and bothersome)            HPI  Telemed visit for right eye redness, itchiness, d/c x 2 days. Using dry eye and Visine eye drops. Leaving for vacation in 2 days for [place]. Has mild nasal congestion, denies fever, sore throat.    PMH:  has a past medical history of Back pain and Insomnia.  MEDS:   Current Outpatient Prescriptions:   •  tobramycin (TOBREX) 0.3 % Solution ophthalmic solution, Place 1 Drop in right eye 4 times a day for 5 days., Disp: 1 Bottle, Rfl: 0  •  estradiol (VIVELLE DOT) 0.1 MG/24HR PATCH BIWEEKLY, APPLY 1 PATCH EXTERNALLY TO THE SKIN 2 TIMES A WEEK AS DIRECTED, Disp: 24 Patch, Rfl: 3  •  therapeutic multivitamin-minerals (THERAGRAN-M) Tab, Take 1 Tab by mouth every day., Disp: , Rfl:   •  zolpidem (AMBIEN) 10 MG Tab, Take 1 Tab by mouth at bedtime as needed for up to 90 days., Disp: 90 Tab, Rfl: 1  •  Hydrocodone-Acetaminophen (VICODIN) 5-300 MG Tab, Take 1-2 tablet by mouth every four hours as needed., Disp: , Rfl:   •  cyclobenzaprine (FLEXERIL) 10 MG Tab, Take 1 Tab by mouth at bedtime as needed., Disp: 15 Tab, Rfl: 0  •  ketotifen (ZADITOR) 0.025 % ophthalmic solution, Place 1 Drop in both eyes 2 times a day as needed (For allergies)., Disp: , Rfl:   •  DimenhyDRINATE (DRAMAMINE PO), Take 0.5 Tabs by mouth as needed (For vertigo)., Disp: , Rfl:   •  acetaminophen (TYLENOL) 500 MG Tab, Take 500-1,000 mg by mouth every 6 hours as needed for Moderate Pain., Disp: , Rfl:   ALLERGIES:   Allergies   Allergen Reactions   • Pcn [Penicillins] Hives   • Sulfa Drugs Hives     SURGHX:   Past Surgical History:   Procedure Laterality Date   • HYSTERECTOMY, TOTAL ABDOMINAL  1994   • DENTAL EXTRACTION(S)     • LITHOTRIPSY     • OTHER ORTHOPEDIC SURGERY      back surgery x 2     SOCHX:  reports that she quit smoking about 22 years ago. Her smoking use included Cigarettes. She has a 15.00  pack-year smoking history. She has never used smokeless tobacco. She reports that she drinks alcohol. She reports that she does not use drugs.  FH: Family history was reviewed, no pertinent findings to report    Review of Systems   Constitutional: Negative for chills, fever and malaise/fatigue.   HENT: Positive for congestion. Negative for ear pain, sinus pain and sore throat.    Eyes: Positive for discharge and redness. Negative for blurred vision, double vision, photophobia and pain.   Respiratory: Negative for cough.    Musculoskeletal: Negative for myalgias.   Skin: Negative for itching and rash.   Neurological: Negative for dizziness, weakness and headaches.   Endo/Heme/Allergies: Negative for environmental allergies.   All other systems reviewed and are negative.         Objective:     There were no vitals taken for this visit.     Physical Exam   Constitutional: She appears well-developed and well-nourished. No distress.   HENT:   Head: Normocephalic.   Eyes: Pupils are equal, round, and reactive to light. EOM and lids are normal. Right eye exhibits no chemosis, no discharge, no exudate and no hordeolum. No foreign body present in the right eye. Right conjunctiva is injected. Right conjunctiva has no hemorrhage. No scleral icterus.   Skin: She is not diaphoretic.   Vitals reviewed.              Assessment/Plan:     1. Acute bacterial conjunctivitis of right eye  - tobramycin (TOBREX) 0.3 % Solution ophthalmic solution; Place 1 Drop in right eye 4 times a day for 5 days.  Dispense: 1 Bottle; Refill: 0      May use longer acting antihistamine prn for eye itchiness  May use cool compresses for eye swelling  Avoid touching eyes  May clean eyes with mild dilute soap along eyelash line with eyes closed, rinse with plenty of water  Avoid wearing eye makeup until cleared  Monitor for increase in redness or swelling, vision change, eye pain- need re-evaluation

## 2018-12-27 ENCOUNTER — OFFICE VISIT (OUTPATIENT)
Dept: URGENT CARE | Facility: CLINIC | Age: 63
End: 2018-12-27
Payer: COMMERCIAL

## 2018-12-27 VITALS
DIASTOLIC BLOOD PRESSURE: 68 MMHG | TEMPERATURE: 98.5 F | RESPIRATION RATE: 16 BRPM | SYSTOLIC BLOOD PRESSURE: 110 MMHG | HEART RATE: 72 BPM | BODY MASS INDEX: 29.32 KG/M2 | HEIGHT: 65 IN | OXYGEN SATURATION: 96 % | WEIGHT: 176 LBS

## 2018-12-27 DIAGNOSIS — H57.89 IRRITATION OF RIGHT EYE: ICD-10-CM

## 2018-12-27 PROCEDURE — 99213 OFFICE O/P EST LOW 20 MIN: CPT | Performed by: PHYSICIAN ASSISTANT

## 2018-12-27 ASSESSMENT — ENCOUNTER SYMPTOMS
FOCAL WEAKNESS: 0
NAUSEA: 0
WHEEZING: 0
SHORTNESS OF BREATH: 0
FEVER: 0
DOUBLE VISION: 0
FOREIGN BODY SENSATION: 0
SENSORY CHANGE: 0
PALPITATIONS: 0
VOMITING: 0
EYE REDNESS: 1
HEADACHES: 0
BLURRED VISION: 0
TINGLING: 0
MYALGIAS: 0
EYE DISCHARGE: 0
EYE PAIN: 1
CHILLS: 0
PHOTOPHOBIA: 0
EYE ITCHING: 0
COUGH: 0
SINUS PAIN: 0
SORE THROAT: 0

## 2018-12-28 ENCOUNTER — TELEPHONE (OUTPATIENT)
Dept: MEDICAL GROUP | Facility: PHYSICIAN GROUP | Age: 63
End: 2018-12-28

## 2018-12-28 NOTE — PROGRESS NOTES
Subjective:      Lurdes Ramon is a 63 y.o. female who presents with Conjunctivitis (X2 both eyes(more the Right eye))            Eye Problem    The right (right eye redness and burning x 2 days. Patient has used OTC eye drops ) eye is affected. This is a new problem. Episode onset: 2 days  The problem occurs constantly. The problem has been unchanged. There was no injury mechanism. The pain is mild (mild burning ). Associated symptoms include eye redness. Pertinent negatives include no blurred vision, eye discharge, double vision, fever, foreign body sensation, itching, nausea, photophobia, recent URI, tingling or vomiting. She has tried eye drops for the symptoms. The treatment provided no relief.     The patient denies recent injury. She has no foreign body sensation.     Past Medical History:   Diagnosis Date   • Back pain    • Insomnia        Past Surgical History:   Procedure Laterality Date   • HYSTERECTOMY, TOTAL ABDOMINAL  1994   • DENTAL EXTRACTION(S)     • LITHOTRIPSY     • OTHER ORTHOPEDIC SURGERY      back surgery x 2       Family History   Problem Relation Age of Onset   • Cancer Sister 58        colorectal   • Heart Attack Father    • Heart Disease Brother 61   • Cancer Mother         brain       Allergies   Allergen Reactions   • Pcn [Penicillins] Hives   • Sulfa Drugs Hives       Medications, Allergies, and current problem list reviewed today in Epic      Review of Systems   Constitutional: Negative for chills, fever and malaise/fatigue.   HENT: Negative for congestion, ear discharge, ear pain, sinus pain and sore throat.    Eyes: Positive for pain (mild right eye burning ) and redness. Negative for blurred vision, double vision, photophobia, discharge and itching.        Mild right eye burning    Respiratory: Negative for cough, shortness of breath and wheezing.    Cardiovascular: Negative for chest pain, palpitations and leg swelling.   Gastrointestinal: Negative for nausea and vomiting.    "  Musculoskeletal: Negative for myalgias.   Skin: Negative for rash.   Neurological: Negative for tingling, sensory change, focal weakness and headaches.     All other systems reviewed and are negative.        Objective:     /68 (BP Location: Right arm, Patient Position: Sitting, BP Cuff Size: Adult)   Pulse 72   Temp 36.9 °C (98.5 °F) (Temporal)   Resp 16   Ht 1.651 m (5' 5\")   Wt 79.8 kg (176 lb)   SpO2 96%   BMI 29.29 kg/m²      VA: right- 20/25 left- 20/25. Both- 20/25.     Physical Exam   Constitutional: She is oriented to person, place, and time. She appears well-developed and well-nourished. No distress.   HENT:   Head: Normocephalic and atraumatic.   Eyes: Pupils are equal, round, and reactive to light. EOM and lids are normal. Right eye exhibits no chemosis, no discharge and no exudate. No foreign body present in the right eye. Left eye exhibits no chemosis, no discharge and no exudate. No foreign body present in the left eye. Right conjunctiva is injected. Right conjunctiva has no hemorrhage. Left conjunctiva is not injected.   Slit lamp exam:       The right eye shows no corneal abrasion, no foreign body and no fluorescein uptake.        The left eye shows no foreign body.       Cardiovascular: Normal rate and regular rhythm.    Pulmonary/Chest: Effort normal. No respiratory distress.   Neurological: She is alert and oriented to person, place, and time. No cranial nerve deficit.   Skin: Skin is warm and dry. No rash noted.   Psychiatric: She has a normal mood and affect. Her behavior is normal. Judgment and thought content normal.               Assessment/Plan:     1. Irritation of right eye    Mild conjunctival irritation.   No signs of bacterial infection.  Advised avoiding  Rubbing eye   OTC lubricating eye drops.     Differential diagnoses, Supportive care, and indications for immediate follow-up discussed with patient.   Instructed to return to clinic or nearest emergency department for " any change in condition, further concerns, or worsening of symptoms.    The patient demonstrated a good understanding and agreed with the treatment plan.    Bety Barnett P.A.-C.

## 2018-12-28 NOTE — TELEPHONE ENCOUNTER
Future Appointments       Provider Department Center    12/31/2018 9:35 AM Deshawn Fowler M.D. [facility]        ESTABLISHED PATIENT PRE-VISIT PLANNING     Patient was contacted to complete PVP.       1.  Reviewed notes from the last few office visits within the medical group: Yes    2.  If any orders were placed at last visit or intended to be done for this visit (i.e. 6 mos follow-up), do we have Results/Consult Notes?        •  Labs - Labs were not ordered at last office visit.       •  Imaging - Imaging ordered, NOT completed. Patient advised to complete prior to next appointment.       •  Referrals - Referral ordered, patient was seen and consult notes were requested from [facility] Care Teams updated  YES.    3. Is this appointment scheduled as a Hospital Follow-Up? No    4.  Immunizations were updated in Epic using WebIZ?: Yes       •  Web Iz Recommendations: TD and SHINGRIX (Shingles)    5.  Patient is due for the following Health Maintenance Topics:   Health Maintenance Due   Topic Date Due   • COLONOSCOPY  04/26/2005   • IMM ZOSTER VACCINES (1 of 2) 04/26/2005     6.  MDX printed for Provider? NO    7.  Patient was informed to arrive 15 min prior to their scheduled appointment and bring in their medication bottles.

## 2018-12-31 ENCOUNTER — OFFICE VISIT (OUTPATIENT)
Dept: MEDICAL GROUP | Facility: PHYSICIAN GROUP | Age: 63
End: 2018-12-31
Payer: COMMERCIAL

## 2018-12-31 VITALS
TEMPERATURE: 98.8 F | HEIGHT: 65 IN | RESPIRATION RATE: 12 BRPM | DIASTOLIC BLOOD PRESSURE: 84 MMHG | BODY MASS INDEX: 28.76 KG/M2 | OXYGEN SATURATION: 97 % | SYSTOLIC BLOOD PRESSURE: 126 MMHG | HEART RATE: 80 BPM | WEIGHT: 172.6 LBS

## 2018-12-31 DIAGNOSIS — M54.5 LOW BACK PAIN, UNSPECIFIED BACK PAIN LATERALITY, UNSPECIFIED CHRONICITY, WITH SCIATICA PRESENCE UNSPECIFIED: ICD-10-CM

## 2018-12-31 DIAGNOSIS — R42 VERTIGO: ICD-10-CM

## 2018-12-31 PROCEDURE — 99213 OFFICE O/P EST LOW 20 MIN: CPT | Performed by: INTERNAL MEDICINE

## 2018-12-31 RX ORDER — ALPRAZOLAM 0.5 MG/1
0.5 TABLET ORAL NIGHTLY PRN
Qty: 30 TAB | Refills: 0 | Status: SHIPPED | OUTPATIENT
Start: 2018-12-31 | End: 2019-01-30

## 2018-12-31 RX ORDER — HYDROCODONE BITARTRATE AND ACETAMINOPHEN 5; 300 MG/1; MG/1
TABLET ORAL
Qty: 60 TAB | Refills: 0 | Status: SHIPPED | OUTPATIENT
Start: 2018-12-31 | End: 2019-02-04 | Stop reason: SDUPTHER

## 2018-12-31 NOTE — ASSESSMENT & PLAN NOTE
Will take xanax for vertigo symptoms. Will also take xanax or ambien as needed for insomnia. She won't take opiates if she's taken xanax or ambien.

## 2018-12-31 NOTE — ASSESSMENT & PLAN NOTE
Has a history of 2 back surgeries. Can go for weeks at times without vicodin but if she's in a lot of pain or doing her physical therapy she will take 1/2 tablet. After her last fusion she has been quite debilitated and pain has limited her function. Says she has seen pain medicine in the past and was on higher doses of opiates. 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

## 2018-12-31 NOTE — PROGRESS NOTES
PRIMARY CARE CLINIC FOLLOW UP VISIT  Chief Complaint   Patient presents with   • Back Pain     spinal fusion   • Anxiety     refill     History of Present Illness     Lurdes is a 64 yo female patient of [facility] presenting for the following:     Low back pain  Has a history of 2 back surgeries. Can go for weeks at times without vicodin but if she's in a lot of pain or doing her physical therapy she will take 1/2 tablet. After her last fusion she has been quite debilitated and pain has limited her function. Says she has seen pain medicine in the past and was on higher doses of opiates. 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     Vertigo  Will take xanax for vertigo symptoms. Will also take xanax or ambien as needed for insomnia. She won't take opiates if she's taken xanax or ambien.     Current Outpatient Prescriptions   Medication Sig Dispense Refill   • Hydrocodone-Acetaminophen (VICODIN) 5-300 MG Tab Take once daily as needed for severe pain 60 Tab 0   • ALPRAZolam (XANAX) 0.5 MG Tab Take 1 Tab by mouth at bedtime as needed for Sleep or Anxiety for up to 30 days. 30 Tab 0   • estradiol (VIVELLE DOT) 0.1 MG/24HR PATCH BIWEEKLY APPLY 1 PATCH EXTERNALLY TO THE SKIN 2 TIMES A WEEK AS DIRECTED 24 Patch 3   • therapeutic multivitamin-minerals (THERAGRAN-M) Tab Take 1 Tab by mouth every day.     • zolpidem (AMBIEN) 10 MG Tab Take 1 Tab by mouth at bedtime as needed for up to 90 days. 90 Tab 1   • DimenhyDRINATE (DRAMAMINE PO) Take 0.5 Tabs by mouth as needed (For vertigo).     • acetaminophen (TYLENOL) 500 MG Tab Take 500-1,000 mg by mouth every 6 hours as needed for Moderate Pain.       No current facility-administered medications for this visit.      Allergies: Pcn [penicillins] and Sulfa drugs    ROS  As per HPI above. All other systems reviewed and negative.        Objective   Blood pressure 126/84, pulse 80, temperature 37.1 °C (98.8 °F), temperature source Temporal, resp.  "rate 12, height 1.651 m (5' 5\"), weight 78.3 kg (172 lb 9.6 oz), SpO2 97 %. Body mass index is 28.72 kg/m².    General: alert and oriented, pleasant, cooperative  HEENT: Normocephalic, atraumatic. No thyroid masses.   Cardiovascular: regular rate and rhythm, normal S1/S2  Pulmonary: lungs clear to auscultation bilaterally  Skin: warm and dry, no lesions or rashes  Psychiatric: appropriate mood and affect. Good insight and appropriate judgment     Assessment and Plan   The following treatment plan was discussed     1. Low back pain, unspecified back pain laterality, unspecified chronicity, with sciatica presence unspecified   reviewed, last filled 60 tablets on 8/30/2018 and has had consistent use under her PCP. Signed opiate consent and also counseled on not using concurrently with her xanax and ambien.   - Hydrocodone-Acetaminophen (VICODIN) 5-300 MG Tab; Take once daily as needed for severe pain  Dispense: 60 Tab; Refill: 0  - Consent for Opiate Prescription    2. Vertigo   reviewed and last filled 30 tablets 10/17/2018, counseled on not using concurrently with her ambien nad vicodin.   - ALPRAZolam (XANAX) 0.5 MG Tab; Take 1 Tab by mouth at bedtime as needed for Sleep or Anxiety for up to 30 days.  Dispense: 30 Tab; Refill: 0    No Follow-up on file.    Deshawn Fowler MD  Internal Medicine  [facility]                   "

## 2019-02-04 DIAGNOSIS — M54.5 LOW BACK PAIN, UNSPECIFIED BACK PAIN LATERALITY, UNSPECIFIED CHRONICITY, WITH SCIATICA PRESENCE UNSPECIFIED: ICD-10-CM

## 2019-02-04 RX ORDER — HYDROCODONE BITARTRATE AND ACETAMINOPHEN 5; 300 MG/1; MG/1
TABLET ORAL
Qty: 60 TAB | Refills: 0 | Status: SHIPPED | OUTPATIENT
Start: 2019-02-04 | End: 2019-04-02 | Stop reason: SDUPTHER

## 2019-02-13 ENCOUNTER — TELEPHONE (OUTPATIENT)
Dept: MEDICAL GROUP | Facility: PHYSICIAN GROUP | Age: 64
End: 2019-02-13

## 2019-02-13 NOTE — TELEPHONE ENCOUNTER
DOCUMENTATION OF PAR STATUS:    1. Name of Medication & Dose: Vicodin 5-300mg     2. Name of Prescription Coverage Company & phone #: [phone]     3. Date Prior Auth Submitted:2/13/19    4. What information was given to obtain insurance decision? CoverMyMeds     5. Prior Auth Status? Pending    6. Patient Notified: yes

## 2019-02-19 ENCOUNTER — HOSPITAL ENCOUNTER (OUTPATIENT)
Facility: MEDICAL CENTER | Age: 64
End: 2019-02-19
Attending: PHYSICIAN ASSISTANT
Payer: COMMERCIAL

## 2019-02-19 ENCOUNTER — OFFICE VISIT (OUTPATIENT)
Dept: URGENT CARE | Facility: PHYSICIAN GROUP | Age: 64
End: 2019-02-19
Payer: COMMERCIAL

## 2019-02-19 VITALS
RESPIRATION RATE: 16 BRPM | TEMPERATURE: 97 F | BODY MASS INDEX: 27.66 KG/M2 | WEIGHT: 166 LBS | OXYGEN SATURATION: 97 % | HEART RATE: 92 BPM | HEIGHT: 65 IN | SYSTOLIC BLOOD PRESSURE: 162 MMHG | DIASTOLIC BLOOD PRESSURE: 96 MMHG

## 2019-02-19 DIAGNOSIS — N30.01 ACUTE CYSTITIS WITH HEMATURIA: ICD-10-CM

## 2019-02-19 DIAGNOSIS — R30.0 DYSURIA: ICD-10-CM

## 2019-02-19 LAB
APPEARANCE UR: CLEAR
BILIRUB UR STRIP-MCNC: NEGATIVE MG/DL
COLOR UR AUTO: YELLOW
GLUCOSE UR STRIP.AUTO-MCNC: NEGATIVE MG/DL
KETONES UR STRIP.AUTO-MCNC: NEGATIVE MG/DL
LEUKOCYTE ESTERASE UR QL STRIP.AUTO: NEGATIVE
NITRITE UR QL STRIP.AUTO: NEGATIVE
PH UR STRIP.AUTO: 5.5 [PH] (ref 5–8)
PROT UR QL STRIP: NEGATIVE MG/DL
RBC UR QL AUTO: NORMAL
SP GR UR STRIP.AUTO: 1
UROBILINOGEN UR STRIP-MCNC: 0.2 MG/DL

## 2019-02-19 PROCEDURE — 81002 URINALYSIS NONAUTO W/O SCOPE: CPT | Performed by: PHYSICIAN ASSISTANT

## 2019-02-19 PROCEDURE — 99214 OFFICE O/P EST MOD 30 MIN: CPT | Performed by: PHYSICIAN ASSISTANT

## 2019-02-19 PROCEDURE — 87086 URINE CULTURE/COLONY COUNT: CPT

## 2019-02-19 RX ORDER — CIPROFLOXACIN 500 MG/1
500 TABLET, FILM COATED ORAL EVERY 12 HOURS
Qty: 6 TAB | Refills: 0 | Status: SHIPPED | OUTPATIENT
Start: 2019-02-19 | End: 2019-02-22

## 2019-02-19 ASSESSMENT — ENCOUNTER SYMPTOMS
VOMITING: 0
FLANK PAIN: 0
CHILLS: 0
NAUSEA: 0

## 2019-02-19 NOTE — PROGRESS NOTES
Subjective:   Lurdes Raomn is a 63 y.o. female who presents for Dysuria (low back [id number])        Hx of kidney stones - 2005.  Patient had lithotripsy done to break up 1 of the stones.  She was followed by urology for many years in [place] but states that she has not had new kidney stones since initial onset in 2005.  Current symptoms do not feel like previous episode of kidney stones.  Hx of spinal fusion.    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  Patient was not catheterized during surgery.  Pt has been constipated lately due to narcotic use.      Dysuria    This is a new problem. The current episode started in the past 7 days. The problem has been gradually worsening. The quality of the pain is described as aching. There has been no fever. There is no history of pyelonephritis. Associated symptoms include frequency, hesitancy and urgency. Pertinent negatives include no chills, flank pain, hematuria, nausea or vomiting. Associated symptoms comments: No dysuria. She has tried nothing for the symptoms. The treatment provided no relief. Her past medical history is significant for kidney stones. There is no history of catheterization or a single kidney.     Review of Systems   Constitutional: Negative for chills.   Gastrointestinal: Negative for nausea and vomiting.   Genitourinary: Positive for dysuria, frequency, hesitancy and urgency. Negative for flank pain and hematuria.       PMH:  has a past medical history of Back pain and Insomnia.  MEDS:   Current Outpatient Prescriptions:   •  ciprofloxacin (CIPRO) 500 MG Tab, Take 1 Tab by mouth every 12 hours for 3 days., Disp: 6 Tab, Rfl: 0  •  estradiol (VIVELLE DOT) 0.1 MG/24HR PATCH BIWEEKLY, APPLY 1 PATCH EXTERNALLY TO THE SKIN 2 TIMES A WEEK AS DIRECTED, Disp: 24 Patch, Rfl: 3  •  therapeutic multivitamin-minerals (THERAGRAN-M) Tab, Take 1 Tab by mouth every day., Disp: , Rfl:   •  Hydrocodone-Acetaminophen (VICODIN) 5-300 MG Tab, Take once daily as  "needed for severe pain, Disp: 60 Tab, Rfl: 0  •  zolpidem (AMBIEN) 10 MG Tab, Take 1 Tab by mouth at bedtime as needed for up to 90 days., Disp: 90 Tab, Rfl: 1  •  DimenhyDRINATE (DRAMAMINE PO), Take 0.5 Tabs by mouth as needed (For vertigo)., Disp: , Rfl:   •  acetaminophen (TYLENOL) 500 MG Tab, Take 500-1,000 mg by mouth every 6 hours as needed for Moderate Pain., Disp: , Rfl:   ALLERGIES:   Allergies   Allergen Reactions   • Clindamycin Shortness of Breath and Swelling   • Pcn [Penicillins] Hives   • Sulfa Drugs Hives     SURGHX:   Past Surgical History:   Procedure Laterality Date   • HYSTERECTOMY, TOTAL ABDOMINAL  1994   • DENTAL EXTRACTION(S)     • LITHOTRIPSY     • OTHER ORTHOPEDIC SURGERY      back surgery x 2     SOCHX:  reports that she quit smoking about 22 years ago. Her smoking use included Cigarettes. She has a 15.00 pack-year smoking history. She has never used smokeless tobacco. She reports that she drinks alcohol. She reports that she does not use drugs.  FH: Family history was reviewed, no pertinent findings to report   Objective:   BP (!) 162/96   Pulse 92   Temp 36.1 °C (97 °F) (Temporal)   Resp 16   Ht 1.651 m (5' 5\")   Wt 75.3 kg (166 lb)   SpO2 97%   BMI 27.62 kg/m²   Physical Exam   Constitutional: She appears well-developed and well-nourished.  Non-toxic appearance. No distress.   HENT:   Head: Normocephalic and atraumatic.   Neck: Neck supple.   Pulmonary/Chest: Effort normal. No respiratory distress.   Abdominal: Soft. There is no tenderness. There is no CVA tenderness.   Musculoskeletal:   Low  to palpation.   Neurological: She is alert.   Skin: Skin is warm and dry.   Psychiatric: She has a normal mood and affect. Her behavior is normal. Thought content normal.   Vitals reviewed.        Assessment/Plan:   1. Acute cystitis with hematuria  - Urine Culture; Future  - ciprofloxacin (CIPRO) 500 MG Tab; Take 1 Tab by mouth every 12 hours for 3 days.  Dispense: 6 Tab; Refill: " 0    2. Dysuria  - POCT Urinalysis     UA significant only for trace red blood cells.  UA is more suggestive of kidney stones however patient's symptoms are more consistent with UTI.  Patient states that she feels her symptoms feel more like a UTI and do not feel like previous episodes of kidney stones.  No flank pain.      Patient has several antibiotic allergies to include anaphylaxis with penicillins and reaction to sulfa medications.  Patient started on a 3-day course of Cipro.  I will send urine for culture.    Patient advised that there is still  A possibility this is a kidney stone or something else entirely, however I believe UTI to be the most likely diagnosis.  If this is a UTI, patient should expect her symptoms to improve in the next 24 hours, with full resolution of symptoms in 3 days.  If symptoms worsen at all in the next 24 hours or fail to improve,  she was instructed to return to clinic or see her PCP for reevaluation.     Take a probiotic or eat Wilma’s yogurt or kefir while taking the medication.  Drink 8, 8oz glasses of water every day.  If you have frequent UTIs or develop them after sexual intercourse try taking D-Mannose 1000mg, two or three times a day or for 3 days following intercourse.  If symptoms fail to improve in 24 hours, worsen or you develop fever, flank pain, nausea, vomiting, or other new symptoms return to the clinic immediately or go the ER.    Patient's blood pressure was elevated at initial reading.  I retook patient's blood pressure and it is 130/90.  Other vital signs stable and patient is well-appearing.    Differential diagnosis, natural history, supportive care, and indications for immediate follow-up discussed.

## 2019-02-20 ENCOUNTER — OFFICE VISIT (OUTPATIENT)
Dept: URGENT CARE | Facility: PHYSICIAN GROUP | Age: 64
End: 2019-02-20
Payer: COMMERCIAL

## 2019-02-20 ENCOUNTER — TELEPHONE (OUTPATIENT)
Dept: URGENT CARE | Facility: PHYSICIAN GROUP | Age: 64
End: 2019-02-20

## 2019-02-20 ENCOUNTER — HOSPITAL ENCOUNTER (OUTPATIENT)
Dept: RADIOLOGY | Facility: MEDICAL CENTER | Age: 64
End: 2019-02-20
Attending: FAMILY MEDICINE
Payer: COMMERCIAL

## 2019-02-20 VITALS
TEMPERATURE: 97.5 F | WEIGHT: 166 LBS | RESPIRATION RATE: 14 BRPM | BODY MASS INDEX: 27.66 KG/M2 | HEART RATE: 94 BPM | HEIGHT: 65 IN | DIASTOLIC BLOOD PRESSURE: 84 MMHG | OXYGEN SATURATION: 97 % | SYSTOLIC BLOOD PRESSURE: 112 MMHG

## 2019-02-20 DIAGNOSIS — R10.9 BELLY PAIN: ICD-10-CM

## 2019-02-20 DIAGNOSIS — R31.9 HEMATURIA, UNSPECIFIED TYPE: ICD-10-CM

## 2019-02-20 DIAGNOSIS — R39.89 BLADDER PAIN: ICD-10-CM

## 2019-02-20 PROCEDURE — 74176 CT ABD & PELVIS W/O CONTRAST: CPT

## 2019-02-20 PROCEDURE — 99214 OFFICE O/P EST MOD 30 MIN: CPT | Performed by: FAMILY MEDICINE

## 2019-02-20 ASSESSMENT — ENCOUNTER SYMPTOMS
FEVER: 0
CHILLS: 0
FLANK PAIN: 0
DIARRHEA: 0
VOMITING: 0
DIZZINESS: 0
FOCAL WEAKNESS: 0
SHORTNESS OF BREATH: 0
ABDOMINAL PAIN: 1
NAUSEA: 0
HEMOPTYSIS: 0

## 2019-02-20 NOTE — PROGRESS NOTES
Subjective:      Lurdes Ramon is a 63 y.o. female who presents with Abdominal Pain (bladder pain poss kidney stones )      - This is a very pleasant, well and non-toxic appearing 63 y.o. female with complaints of suprapubic/lower abd pain ~4 days. Constant. Nothing makes it better or worse. Really no urinary symptoms. No NVFC. Hx kidney stones and is worried about this maybe coming back and asking for imaging. Has  No local Urologist. UA yesterday w/ some blood.           ALLERGIES:  Clindamycin; Pcn [penicillins]; and Sulfa drugs     PMH:  Past Medical History:   Diagnosis Date   • Back pain    • Insomnia         PSH:  Past Surgical History:   Procedure Laterality Date   • HYSTERECTOMY, TOTAL ABDOMINAL  1994   • DENTAL EXTRACTION(S)     • LITHOTRIPSY     • OTHER ORTHOPEDIC SURGERY      back surgery x 2       MEDS:    Current Outpatient Prescriptions:   •  ciprofloxacin (CIPRO) 500 MG Tab, Take 1 Tab by mouth every 12 hours for 3 days., Disp: 6 Tab, Rfl: 0  •  Hydrocodone-Acetaminophen (VICODIN) 5-300 MG Tab, Take once daily as needed for severe pain, Disp: 60 Tab, Rfl: 0  •  estradiol (VIVELLE DOT) 0.1 MG/24HR PATCH BIWEEKLY, APPLY 1 PATCH EXTERNALLY TO THE SKIN 2 TIMES A WEEK AS DIRECTED, Disp: 24 Patch, Rfl: 3  •  therapeutic multivitamin-minerals (THERAGRAN-M) Tab, Take 1 Tab by mouth every day., Disp: , Rfl:   •  DimenhyDRINATE (DRAMAMINE PO), Take 0.5 Tabs by mouth as needed (For vertigo)., Disp: , Rfl:   •  acetaminophen (TYLENOL) 500 MG Tab, Take 500-1,000 mg by mouth every 6 hours as needed for Moderate Pain., Disp: , Rfl:     ** I have documented what I find to be significant in regards to past medical, social, family and surgical history  in my HPI or under PMH/PSH/FH review section, otherwise it is contributory **           HPI    Review of Systems   Constitutional: Negative for chills and fever.   Respiratory: Negative for hemoptysis and shortness of breath.    Gastrointestinal: Positive for  "abdominal pain ( suprapubic). Negative for diarrhea, nausea and vomiting.   Genitourinary: Negative for dysuria, flank pain and frequency.   Neurological: Negative for dizziness and focal weakness.          Objective:     /84   Pulse 94   Temp 36.4 °C (97.5 °F) (Temporal)   Resp 14   Ht 1.651 m (5' 5\")   Wt 75.3 kg (166 lb)   SpO2 97%   BMI 27.62 kg/m²      Physical Exam   Constitutional: She appears well-developed. No distress.   HENT:   Head: Normocephalic and atraumatic.   Eyes: Conjunctivae are normal.   Neck: Neck supple.   Cardiovascular: Regular rhythm.    No murmur heard.  Pulmonary/Chest: Effort normal. No respiratory distress.   Abdominal: Soft. She exhibits no distension. There is no tenderness. There is no rebound and no guarding.   Neurological: She is alert. She exhibits normal muscle tone.   Skin: Skin is warm and dry.   Psychiatric: She has a normal mood and affect. Judgment normal.   Nursing note and vitals reviewed.              Assessment/Plan:         1. Belly pain  CT-RENAL COLIC EVALUATION(A/P W/O)   2. Bladder pain     3. Blood in urine               Dx & d/c instructions discussed w/ patient and/or family members.     ER precautions (worsening signs symptoms and when to go to ER) discussed.    Follow up w/ PCP in 2-3 days to make sure symptoms improving and no further intervention/treatment and/or work-up needed was advised, ER if feeling worse or not improving in 2 days.    Possible side effects (i.e. Rash, GI upset/constipation, sedation, elevation of BP or sugars) of any medications given discussed.     Patient left in stable condition     Addendum 2/20/2019 4:35pm, I reviewed results with patient and encourage her to f/u w/ urologist for further w/o of her belly/bladder pains. ER if getting worse.        "

## 2019-02-20 NOTE — TELEPHONE ENCOUNTER
February 20, 2019 at 11:55 AM    I contacted patient to inform her that her urine culture results yielded no growth after 24 hours. It is unlikely that sx are related to a UTI. I was unable to get patient on the phone however I did leave her a detailed message.  Unless patient's symptoms have completely resolved it is important that she be reevaluated soonest.  I instructed patient to either see her primary care provider today or to return to urgent care for reevaluation.

## 2019-02-21 LAB
BACTERIA UR CULT: NORMAL
SIGNIFICANT IND 70042: NORMAL
SITE SITE: NORMAL
SOURCE SOURCE: NORMAL

## 2019-03-05 ENCOUNTER — OFFICE VISIT (OUTPATIENT)
Dept: URGENT CARE | Facility: PHYSICIAN GROUP | Age: 64
End: 2019-03-05
Payer: COMMERCIAL

## 2019-03-05 ENCOUNTER — HOSPITAL ENCOUNTER (OUTPATIENT)
Facility: MEDICAL CENTER | Age: 64
End: 2019-03-05
Attending: PHYSICIAN ASSISTANT
Payer: COMMERCIAL

## 2019-03-05 VITALS
DIASTOLIC BLOOD PRESSURE: 80 MMHG | HEART RATE: 85 BPM | OXYGEN SATURATION: 98 % | BODY MASS INDEX: 28.99 KG/M2 | SYSTOLIC BLOOD PRESSURE: 128 MMHG | TEMPERATURE: 98 F | WEIGHT: 174 LBS | RESPIRATION RATE: 16 BRPM | HEIGHT: 65 IN

## 2019-03-05 DIAGNOSIS — N89.8 VAGINAL ITCHING: ICD-10-CM

## 2019-03-05 LAB
APPEARANCE UR: CLEAR
BILIRUB UR STRIP-MCNC: NEGATIVE MG/DL
COLOR UR AUTO: YELLOW
GLUCOSE UR STRIP.AUTO-MCNC: NEGATIVE MG/DL
KETONES UR STRIP.AUTO-MCNC: NEGATIVE MG/DL
LEUKOCYTE ESTERASE UR QL STRIP.AUTO: NEGATIVE
NITRITE UR QL STRIP.AUTO: NEGATIVE
PH UR STRIP.AUTO: 6 [PH] (ref 5–8)
PROT UR QL STRIP: NEGATIVE MG/DL
RBC UR QL AUTO: NORMAL
SP GR UR STRIP.AUTO: 1.01
UROBILINOGEN UR STRIP-MCNC: 0.2 MG/DL

## 2019-03-05 PROCEDURE — 87660 TRICHOMONAS VAGIN DIR PROBE: CPT

## 2019-03-05 PROCEDURE — 87480 CANDIDA DNA DIR PROBE: CPT

## 2019-03-05 PROCEDURE — 99214 OFFICE O/P EST MOD 30 MIN: CPT | Performed by: PHYSICIAN ASSISTANT

## 2019-03-05 PROCEDURE — 87510 GARDNER VAG DNA DIR PROBE: CPT

## 2019-03-05 PROCEDURE — 81002 URINALYSIS NONAUTO W/O SCOPE: CPT | Performed by: PHYSICIAN ASSISTANT

## 2019-03-05 RX ORDER — FLUCONAZOLE 150 MG/1
150 TABLET ORAL ONCE
Qty: 1 TAB | Refills: 0 | Status: SHIPPED | OUTPATIENT
Start: 2019-03-05 | End: 2019-03-05

## 2019-03-05 ASSESSMENT — ENCOUNTER SYMPTOMS
DIZZINESS: 0
MUSCULOSKELETAL NEGATIVE: 1
VOMITING: 0
ABDOMINAL PAIN: 0
FEVER: 0
DIARRHEA: 0
CHILLS: 0
NAUSEA: 0
SHORTNESS OF BREATH: 0

## 2019-03-05 NOTE — PROGRESS NOTES
"Subjective:      Lurdes Ramon is a 63 y.o. female who presents with Vaginal Itching (Vaginal itching, thick white discharge x5days )            Vaginal Itching   This is a new problem. The current episode started in the past 7 days (5 days). The problem occurs constantly. The problem has been unchanged. Pertinent negatives include no abdominal pain, chest pain, chills, congestion, fever, nausea, rash or vomiting. Nothing aggravates the symptoms. She has tried nothing for the symptoms.     Patient presents to urgent care reporting a 5 day history of vaginal itching and thick, clumpy white vaginal discharge. She states her symptoms are similar to previous yeast infections in the past. She used OTC monistat and reports some improvement but not complete resolution. No urinary symptoms, abdominal pain, or flank pain.     Review of Systems   Constitutional: Negative for chills and fever.   HENT: Negative for congestion.    Respiratory: Negative for shortness of breath.    Cardiovascular: Negative for chest pain.   Gastrointestinal: Negative for abdominal pain, diarrhea, nausea and vomiting.   Genitourinary: Negative for dysuria, frequency and urgency.        + vaginal itching and discharge    Musculoskeletal: Negative.    Skin: Negative for itching and rash.   Neurological: Negative for dizziness.        Objective:     /80   Pulse 85   Temp 36.7 °C (98 °F) (Temporal)   Resp 16   Ht 1.651 m (5' 5\")   Wt 78.9 kg (174 lb)   SpO2 98%   BMI 28.96 kg/m²      Physical Exam   Constitutional: She is oriented to person, place, and time. She appears well-developed and well-nourished. No distress.   HENT:   Head: Normocephalic and atraumatic.   Eyes: Pupils are equal, round, and reactive to light.   Neck: Normal range of motion.   Cardiovascular: Normal rate.    Pulmonary/Chest: Effort normal.   Abdominal: Soft. Bowel sounds are normal. She exhibits no distension. There is no tenderness. There is no guarding. "   No CVAT bilaterally    Genitourinary:   Genitourinary Comments: Exam deferred   Musculoskeletal: Normal range of motion.   Neurological: She is alert and oriented to person, place, and time.   Skin: Skin is warm and dry. She is not diaphoretic.   Psychiatric: She has a normal mood and affect. Her behavior is normal.   Nursing note and vitals reviewed.         PMH:  has a past medical history of Back pain and Insomnia.  MEDS:   Current Outpatient Prescriptions:   •  fluconazole (DIFLUCAN) 150 MG tablet, Take 1 Tab by mouth Once for 1 dose., Disp: 1 Tab, Rfl: 0  •  Hydrocodone-Acetaminophen (VICODIN) 5-300 MG Tab, Take once daily as needed for severe pain, Disp: 60 Tab, Rfl: 0  •  estradiol (VIVELLE DOT) 0.1 MG/24HR PATCH BIWEEKLY, APPLY 1 PATCH EXTERNALLY TO THE SKIN 2 TIMES A WEEK AS DIRECTED, Disp: 24 Patch, Rfl: 3  •  therapeutic multivitamin-minerals (THERAGRAN-M) Tab, Take 1 Tab by mouth every day., Disp: , Rfl:   •  DimenhyDRINATE (DRAMAMINE PO), Take 0.5 Tabs by mouth as needed (For vertigo)., Disp: , Rfl:   •  acetaminophen (TYLENOL) 500 MG Tab, Take 500-1,000 mg by mouth every 6 hours as needed for Moderate Pain., Disp: , Rfl:   ALLERGIES:   Allergies   Allergen Reactions   • Clindamycin Shortness of Breath and Swelling   • Pcn [Penicillins] Hives   • Sulfa Drugs Hives     SURGHX:   Past Surgical History:   Procedure Laterality Date   • HYSTERECTOMY, TOTAL ABDOMINAL  1994   • DENTAL EXTRACTION(S)     • LITHOTRIPSY     • OTHER ORTHOPEDIC SURGERY      back surgery x 2     SOCHX:  reports that she quit smoking about 22 years ago. Her smoking use included Cigarettes. She has a 15.00 pack-year smoking history. She has never used smokeless tobacco. She reports that she drinks alcohol. She reports that she does not use drugs.  FH: family history includes Cancer in her mother; Cancer (age of onset: 58) in her sister; Heart Attack in her father; Heart Disease (age of onset: 61) in her brother.    POCT  Urinalysis:  Component Results     Component Value Ref Range & Units Status   POC Color YELLOW  Negative Final   POC Appearance CLEAR  Negative Final   POC Leukocyte Esterase NEGATIVE  Negative Final   POC Nitrites NEGATIVE  Negative Final   POC Urobiligen 0.2  Negative (0.2) mg/dL Final   POC Protein NEGATIVE  Negative mg/dL Final   POC Urine PH 6.0  5.0 - 8.0 Final   POC Blood TARCE  Negative Final   POC Specific Gravity 1.010  <1.005 - >1.030 Final   POC Ketones NEGATIVE  Negative mg/dL Final   POC Bilirubin NEGATIVE  Negative mg/dL Final   POC Glucose NEGATIVE  Negative mg/dL Final       Assessment/Plan:     1. Vaginal itching  - POCT Urinalysis --> trace blood, otherwise normal  - VAGINAL PATHOGENS DNA PANEL; Future  - fluconazole (DIFLUCAN) 150 MG tablet; Take 1 Tab by mouth Once for 1 dose.  Dispense: 1 Tab; Refill: 0    Patient self swabbed for vaginal pathogens at today's visit. Will treat empirically at today's visit, pending swab results. Call or return to office if symptoms persist or worsen. The patient demonstrated a good understanding and agreed with the treatment plan.

## 2019-03-07 LAB
CANDIDA DNA VAG QL PROBE+SIG AMP: POSITIVE
G VAGINALIS DNA VAG QL PROBE+SIG AMP: POSITIVE
T VAGINALIS DNA VAG QL PROBE+SIG AMP: NEGATIVE

## 2019-03-12 ENCOUNTER — TELEPHONE (OUTPATIENT)
Dept: URGENT CARE | Facility: CLINIC | Age: 64
End: 2019-03-12

## 2019-03-12 DIAGNOSIS — B96.89 BACTERIAL VAGINITIS: ICD-10-CM

## 2019-03-12 DIAGNOSIS — N76.0 BACTERIAL VAGINITIS: ICD-10-CM

## 2019-03-12 RX ORDER — METRONIDAZOLE 500 MG/1
500 TABLET ORAL 2 TIMES DAILY
Qty: 14 TAB | Refills: 0 | Status: SHIPPED | OUTPATIENT
Start: 2019-03-12 | End: 2019-03-19

## 2019-03-12 NOTE — TELEPHONE ENCOUNTER
Left message for patient informing her of positive results for yeast and BV. Patient has already been treated with Diflucan, will send additional course of Flagyl 500 mg BID x 1 week to patient's pharmacy. Encouraged to return my call with any questions or concerns.

## 2019-03-13 RX ORDER — ALPRAZOLAM 0.5 MG/1
TABLET ORAL
Refills: 0 | OUTPATIENT
Start: 2019-03-13

## 2019-03-21 ENCOUNTER — HOSPITAL ENCOUNTER (OUTPATIENT)
Facility: MEDICAL CENTER | Age: 64
End: 2019-03-21
Attending: PHYSICIAN ASSISTANT
Payer: COMMERCIAL

## 2019-03-21 ENCOUNTER — OFFICE VISIT (OUTPATIENT)
Dept: URGENT CARE | Facility: PHYSICIAN GROUP | Age: 64
End: 2019-03-21
Payer: COMMERCIAL

## 2019-03-21 VITALS
HEART RATE: 76 BPM | TEMPERATURE: 98.5 F | SYSTOLIC BLOOD PRESSURE: 130 MMHG | RESPIRATION RATE: 16 BRPM | HEIGHT: 65 IN | OXYGEN SATURATION: 97 % | BODY MASS INDEX: 28.99 KG/M2 | DIASTOLIC BLOOD PRESSURE: 84 MMHG | WEIGHT: 174 LBS

## 2019-03-21 DIAGNOSIS — N76.0 VAGINITIS AND VULVOVAGINITIS: ICD-10-CM

## 2019-03-21 LAB
APPEARANCE UR: CLEAR
BILIRUB UR STRIP-MCNC: NEGATIVE MG/DL
CANDIDA DNA VAG QL PROBE+SIG AMP: NEGATIVE
COLOR UR AUTO: YELLOW
G VAGINALIS DNA VAG QL PROBE+SIG AMP: NEGATIVE
GLUCOSE UR STRIP.AUTO-MCNC: NEGATIVE MG/DL
KETONES UR STRIP.AUTO-MCNC: NEGATIVE MG/DL
LEUKOCYTE ESTERASE UR QL STRIP.AUTO: NEGATIVE
NITRITE UR QL STRIP.AUTO: NEGATIVE
PH UR STRIP.AUTO: 5.5 [PH] (ref 5–8)
PROT UR QL STRIP: NEGATIVE MG/DL
RBC UR QL AUTO: NORMAL
SP GR UR STRIP.AUTO: 1.02
T VAGINALIS DNA VAG QL PROBE+SIG AMP: NEGATIVE
UROBILINOGEN UR STRIP-MCNC: NORMAL MG/DL

## 2019-03-21 PROCEDURE — 87480 CANDIDA DNA DIR PROBE: CPT

## 2019-03-21 PROCEDURE — 81002 URINALYSIS NONAUTO W/O SCOPE: CPT | Performed by: PHYSICIAN ASSISTANT

## 2019-03-21 PROCEDURE — 87510 GARDNER VAG DNA DIR PROBE: CPT

## 2019-03-21 PROCEDURE — 87660 TRICHOMONAS VAGIN DIR PROBE: CPT

## 2019-03-21 PROCEDURE — 99214 OFFICE O/P EST MOD 30 MIN: CPT | Performed by: PHYSICIAN ASSISTANT

## 2019-03-21 RX ORDER — ZOLPIDEM TARTRATE 10 MG/1
10 TABLET ORAL NIGHTLY PRN
COMMUNITY
End: 2019-04-02 | Stop reason: SDUPTHER

## 2019-03-21 RX ORDER — ALPRAZOLAM 0.5 MG/1
0.5 TABLET ORAL NIGHTLY PRN
COMMUNITY
End: 2019-04-02 | Stop reason: SDUPTHER

## 2019-03-24 ASSESSMENT — ENCOUNTER SYMPTOMS
FLANK PAIN: 0
ABDOMINAL PAIN: 0
FEVER: 0
VAGINITIS: 1

## 2019-03-25 NOTE — PROGRESS NOTES
Subjective:      Lurdes Ramon is a 63 y.o. female who presents with Vaginal Discharge (vaginal discharge, itching burning x 2 weeks (seen for sx's 2 weeks prior))    PMH:  has a past medical history of Back pain and Insomnia.  MEDS:   Current Outpatient Prescriptions:   •  zolpidem (AMBIEN) 10 MG Tab, Take 10 mg by mouth at bedtime as needed for Sleep., Disp: , Rfl:   •  ALPRAZolam (XANAX) 0.5 MG Tab, Take 0.5 mg by mouth at bedtime as needed for Sleep., Disp: , Rfl:   •  estradiol (VIVELLE DOT) 0.1 MG/24HR PATCH BIWEEKLY, APPLY 1 PATCH EXTERNALLY TO THE SKIN 2 TIMES A WEEK AS DIRECTED, Disp: 24 Patch, Rfl: 3  •  therapeutic multivitamin-minerals (THERAGRAN-M) Tab, Take 1 Tab by mouth every day., Disp: , Rfl:   •  Hydrocodone-Acetaminophen (VICODIN) 5-300 MG Tab, Take once daily as needed for severe pain, Disp: 60 Tab, Rfl: 0  •  DimenhyDRINATE (DRAMAMINE PO), Take 0.5 Tabs by mouth as needed (For vertigo)., Disp: , Rfl:   •  acetaminophen (TYLENOL) 500 MG Tab, Take 500-1,000 mg by mouth every 6 hours as needed for Moderate Pain., Disp: , Rfl:   ALLERGIES:   Allergies   Allergen Reactions   • Clindamycin Shortness of Breath and Swelling   • Pcn [Penicillins] Hives   • Sulfa Drugs Hives     SURGHX:   Past Surgical History:   Procedure Laterality Date   • HYSTERECTOMY, TOTAL ABDOMINAL  1994   • DENTAL EXTRACTION(S)     • LITHOTRIPSY     • OTHER ORTHOPEDIC SURGERY      back surgery x 2     SOCHX:  reports that she quit smoking about 22 years ago. Her smoking use included Cigarettes. She has a 15.00 pack-year smoking history. She has never used smokeless tobacco. She reports that she drinks alcohol. She reports that she does not use drugs.  FH: Reviewed with patient, not pertinent to this visit.           Patient presents with:  Vaginal Discharge: vaginal discharge, itching burning x 2 weeks (seen for sx's 2 weeks prior)  Pt was treated for candidiasis as well as BV. Pt states she feels as if they have both  "returned.            Vaginitis   The patient's primary symptoms include genital itching and vaginal discharge. The patient's pertinent negatives include no genital rash. This is a new problem. The current episode started 1 to 4 weeks ago. The problem occurs intermittently. The problem has been gradually worsening. The patient is experiencing no pain. The problem affects both sides. She is not pregnant. Pertinent negatives include no abdominal pain, discolored urine, dysuria, fever, flank pain, frequency, hematuria, rash or urgency. The vaginal discharge was thin and white. There has been no bleeding. She has not been passing clots. She has not been passing tissue. The symptoms are aggravated by activity and tactile pressure. She has tried antifungals and antibiotics for the symptoms. The treatment provided moderate relief. She is not sexually active. No, her partner does not have an STD. She is postmenopausal.       Review of Systems   Constitutional: Negative for fever.   Gastrointestinal: Negative for abdominal pain.   Genitourinary: Positive for vaginal discharge. Negative for dysuria, flank pain, frequency, hematuria and urgency.   Skin: Positive for itching. Negative for rash.   All other systems reviewed and are negative.         Objective:     /84 (BP Location: Left arm, Patient Position: Sitting, BP Cuff Size: Adult)   Pulse 76   Temp 36.9 °C (98.5 °F) (Temporal)   Resp 16   Ht 1.651 m (5' 5\")   Wt 78.9 kg (174 lb)   SpO2 97%   BMI 28.96 kg/m²      Physical Exam   Constitutional: She is oriented to person, place, and time. She appears well-developed and well-nourished. No distress.   HENT:   Head: Normocephalic.   Mouth/Throat: Oropharynx is clear and moist.   Eyes: Pupils are equal, round, and reactive to light. Conjunctivae and EOM are normal.   Neck: Normal range of motion. Neck supple.   Cardiovascular: Normal rate.    Pulmonary/Chest: Effort normal and breath sounds normal.   Abdominal: " Soft. Bowel sounds are normal. There is no rebound and no guarding.   Genitourinary:   Genitourinary Comments: Deferred, pt did self swab   Musculoskeletal: Normal range of motion.   Neurological: She is alert and oriented to person, place, and time.   Skin: Skin is warm and dry. Capillary refill takes less than 2 seconds.   Psychiatric: She has a normal mood and affect.   Nursing note and vitals reviewed.         UA results interpreted by me: neg urine dip.     Assessment/Plan:     1. Vaginitis and vulvovaginitis  VAGINAL PATHOGENS DNA PANEL    POCT Urinalysis     Will wait to start treatment until results are known.      PT should follow up with PCP in 1-2 days for re-evaluation if symptoms have not improved.  Discussed red flags and reasons to return to UC or ED.  Pt and/or family verbalized understanding of diagnosis and follow up instructions and was offered informational handout on diagnosis.  PT discharged.

## 2019-04-02 ENCOUNTER — OFFICE VISIT (OUTPATIENT)
Dept: MEDICAL GROUP | Facility: PHYSICIAN GROUP | Age: 64
End: 2019-04-02
Payer: COMMERCIAL

## 2019-04-02 ENCOUNTER — TELEPHONE (OUTPATIENT)
Dept: MEDICAL GROUP | Facility: PHYSICIAN GROUP | Age: 64
End: 2019-04-02

## 2019-04-02 VITALS
HEART RATE: 90 BPM | BODY MASS INDEX: 28.82 KG/M2 | RESPIRATION RATE: 16 BRPM | OXYGEN SATURATION: 94 % | TEMPERATURE: 97.6 F | WEIGHT: 173 LBS | DIASTOLIC BLOOD PRESSURE: 76 MMHG | SYSTOLIC BLOOD PRESSURE: 120 MMHG | HEIGHT: 65 IN

## 2019-04-02 DIAGNOSIS — M54.5 LOW BACK PAIN, UNSPECIFIED BACK PAIN LATERALITY, UNSPECIFIED CHRONICITY, WITH SCIATICA PRESENCE UNSPECIFIED: ICD-10-CM

## 2019-04-02 DIAGNOSIS — Z12.12 SCREENING FOR COLORECTAL CANCER: ICD-10-CM

## 2019-04-02 DIAGNOSIS — G47.00 INSOMNIA, UNSPECIFIED TYPE: ICD-10-CM

## 2019-04-02 DIAGNOSIS — Z12.11 SCREENING FOR COLORECTAL CANCER: ICD-10-CM

## 2019-04-02 DIAGNOSIS — F41.9 ANXIETY: ICD-10-CM

## 2019-04-02 DIAGNOSIS — Z00.00 WELLNESS EXAMINATION: ICD-10-CM

## 2019-04-02 PROCEDURE — 99214 OFFICE O/P EST MOD 30 MIN: CPT | Performed by: NURSE PRACTITIONER

## 2019-04-02 RX ORDER — PERPHENAZINE 2 MG
1 TABLET ORAL
COMMUNITY
End: 2019-04-02

## 2019-04-02 RX ORDER — ONDANSETRON 4 MG/1
4 TABLET, ORALLY DISINTEGRATING ORAL
COMMUNITY
Start: 2016-03-31 | End: 2019-04-02

## 2019-04-02 RX ORDER — ZOLPIDEM TARTRATE 10 MG/1
TABLET ORAL
COMMUNITY
Start: 2016-10-24 | End: 2019-04-02

## 2019-04-02 RX ORDER — ALPRAZOLAM 0.5 MG/1
0.5 TABLET ORAL NIGHTLY PRN
Qty: 30 TAB | Refills: 5 | Status: SHIPPED | OUTPATIENT
Start: 2019-04-02 | End: 2019-05-02

## 2019-04-02 RX ORDER — NAPROXEN SODIUM 220 MG
220 TABLET ORAL
COMMUNITY
End: 2021-01-06

## 2019-04-02 RX ORDER — FLUCONAZOLE 150 MG/1
TABLET ORAL
Refills: 0 | COMMUNITY
Start: 2019-03-05 | End: 2019-04-02

## 2019-04-02 RX ORDER — CHLORHEXIDINE GLUCONATE ORAL RINSE 1.2 MG/ML
SOLUTION DENTAL
Refills: 0 | COMMUNITY
Start: 2019-01-28 | End: 2019-04-02

## 2019-04-02 RX ORDER — AZITHROMYCIN 500 MG/1
TABLET, FILM COATED ORAL
Refills: 0 | COMMUNITY
Start: 2019-01-30 | End: 2019-04-02

## 2019-04-02 RX ORDER — ALPRAZOLAM 0.5 MG/1
TABLET ORAL
COMMUNITY
Start: 2016-07-25 | End: 2019-04-02

## 2019-04-02 RX ORDER — HYDROCODONE BITARTRATE AND ACETAMINOPHEN 5; 300 MG/1; MG/1
1-2 TABLET ORAL
COMMUNITY
Start: 2016-03-31 | End: 2019-04-02

## 2019-04-02 RX ORDER — ZOLPIDEM TARTRATE 10 MG/1
10 TABLET ORAL NIGHTLY PRN
Qty: 30 TAB | Refills: 5 | Status: SHIPPED | OUTPATIENT
Start: 2019-04-02 | End: 2019-05-02

## 2019-04-02 RX ORDER — ESTRADIOL 0.07 MG/D
1 FILM, EXTENDED RELEASE TRANSDERMAL
COMMUNITY
Start: 2016-03-31 | End: 2019-04-02

## 2019-04-02 RX ORDER — ONDANSETRON 4 MG/1
TABLET, ORALLY DISINTEGRATING ORAL
Refills: 0 | COMMUNITY
Start: 2019-01-28 | End: 2019-04-02

## 2019-04-02 RX ORDER — IBUPROFEN 800 MG/1
TABLET ORAL
Refills: 0 | COMMUNITY
Start: 2019-01-28 | End: 2019-04-29

## 2019-04-02 RX ORDER — HYDROCODONE BITARTRATE AND ACETAMINOPHEN 5; 300 MG/1; MG/1
1 TABLET ORAL 2 TIMES DAILY PRN
Qty: 60 TAB | Refills: 0 | Status: SHIPPED | OUTPATIENT
Start: 2019-04-02 | End: 2019-04-18 | Stop reason: SDUPTHER

## 2019-04-02 RX ORDER — TRIAMCINOLONE ACETONIDE 1 MG/G
CREAM TOPICAL
COMMUNITY
Start: 2016-06-24 | End: 2019-04-02

## 2019-04-02 RX ORDER — CLINDAMYCIN HYDROCHLORIDE 300 MG/1
CAPSULE ORAL
Refills: 0 | COMMUNITY
Start: 2019-01-28 | End: 2019-04-02

## 2019-04-02 RX ORDER — DIPHENOXYLATE HYDROCHLORIDE AND ATROPINE SULFATE 2.5; .025 MG/1; MG/1
1 TABLET ORAL
COMMUNITY

## 2019-04-02 ASSESSMENT — PATIENT HEALTH QUESTIONNAIRE - PHQ9: CLINICAL INTERPRETATION OF PHQ2 SCORE: 0

## 2019-04-02 NOTE — ASSESSMENT & PLAN NOTE
Had to use her pain medication for recent oral surgery with bone grafting, as her oral surgeon would not prescribe anything for her.  Needs a refill.  A prescription usually lasts at least 6 months.   reviewed.

## 2019-04-02 NOTE — PROGRESS NOTES
Chief Complaint   Patient presents with   • Medication Refill   • Rash     Mouth/x 3 days        HISTORY OF PRESENT ILLNESS: Patient is a 63 y.o. female established patient who presents today to discuss the following issues:    Screening for colorectal cancer  Due for colonoscopy.    Low back pain  Had to use her pain medication for recent oral surgery with bone grafting, as her oral surgeon would not prescribe anything for her.  Needs a refill.  A prescription usually lasts at least 6 months.   reviewed.    Insomnia  Chronic in nature.  Stable on meds.  Due for refills of ambien.    Anxiety  Anxiety is stable.  Due for refills of xanax.  Is aware that she is not to take xanax with pain medication.    Wellness examination  Due for lab work.      Patient Active Problem List    Diagnosis Date Noted   • Screening for colorectal cancer 04/02/2019   • Wellness examination 04/02/2019   • Vertigo 01/17/2018   • Hormone replacement therapy (HRT) 01/25/2017   • Insomnia 10/26/2016   • Low back pain 10/26/2016   • Anxiety 10/26/2016       Allergies:Pcn [penicillins]; Sulfa drugs; and Clindamycin    Current Outpatient Prescriptions   Medication Sig Dispense Refill   • Multiple Vitamin (MULTI-VITAMINS) Tab Take 1 tablet by mouth.     • ALPRAZolam (XANAX) 0.5 MG Tab Take 1 Tab by mouth at bedtime as needed for Sleep for up to 30 days. 30 Tab 5   • zolpidem (AMBIEN) 10 MG Tab Take 1 Tab by mouth at bedtime as needed for Sleep for up to 30 days. 30 Tab 5   • Hydrocodone-Acetaminophen (VICODIN) 5-300 MG Tab Take 1 Tab by mouth 2 times a day as needed for up to 30 days. 60 Tab 0   • estradiol (VIVELLE DOT) 0.1 MG/24HR PATCH BIWEEKLY APPLY 1 PATCH EXTERNALLY TO THE SKIN 2 TIMES A WEEK AS DIRECTED (Patient taking differently: Apply 1 Patch to skin as directed 2X A WEEK. APPLY 1 PATCH EXTERNALLY TO THE SKIN 2 TIMES A WEEK AS DIRECTED) 24 Patch 3   • ibuprofen (MOTRIN) 800 MG Tab TK1  T PO Q 6 H PRN P  0   • naproxen (ANAPROX) 220 MG  "tablet Take 220 mg by mouth.     • acetaminophen (TYLENOL) 500 MG Tab Take 500-1,000 mg by mouth every 6 hours as needed for Moderate Pain.       No current facility-administered medications for this visit.        Social History   Substance Use Topics   • Smoking status: Former Smoker     Packs/day: 1.00     Years: 15.00     Types: Cigarettes     Quit date: 10/26/1996   • Smokeless tobacco: Never Used   • Alcohol use 0.0 oz/week      Comment: socially       Family Status   Relation Status   • Sis (Not Specified)   • Fa (Not Specified)   • Bro (Not Specified)   • Mo (Not Specified)     Family History   Problem Relation Age of Onset   • Cancer Sister 58        colorectal   • Heart Attack Father    • Heart Disease Brother 61   • Cancer Mother         brain       Review of Systems:   Constitutional: Negative for fever, chills, weight loss and malaise/fatigue.   HENT: Negative for ear pain, nosebleeds, congestion, sore throat and neck pain.    Eyes: Negative for blurred vision.   Respiratory: Negative for cough, sputum production, shortness of breath and wheezing.    Cardiovascular: Negative for chest pain, palpitations, orthopnea and leg swelling.   Gastrointestinal: Negative for heartburn, nausea, vomiting and abdominal pain.   Genitourinary: Negative for dysuria, urgency and frequency.   Musculoskeletal: Negative for myalgias and joint pain. Positive for chronic low back pain.  Skin: Negative for rash and itching.   Neurological: Negative for dizziness, tingling, tremors, sensory change, focal weakness and headaches.   Endo/Heme/Allergies: Does not bruise/bleed easily.   Psychiatric/Behavioral: Positive for stable anxiety and insomnia.  All other systems reviewed and are negative except as in HPI.    Exam:  Blood pressure 120/76, pulse 90, temperature 36.4 °C (97.6 °F), resp. rate 16, height 1.651 m (5' 5\"), weight 78.5 kg (173 lb), SpO2 94 %.  General:  Well nourished, well developed female in NAD  Head: Grossly " normal.  Neck: Supple without JVD or bruit. Thyroid is not enlarged.  Pulmonary: Clear to ausculation. Normal effort. No rales, ronchi, or wheezing.  Cardiovascular: Regular rate and rhythm without murmur.   Abdomen:  Soft, nontender, nondistended.  Extremities: No clubbing, cyanosis, or edema.  Skin: Intact with no obvious rashes or lesions.  Neuro: Grossly intact.  Psych: Alert and oriented x 3.  Mood and affect appropriate.    Medical decision-making and discussion: Lurdes is here today for follow-up.  Lab work was ordered, her medications were refilled, and a referral was sent for colonoscopy.  She will follow-up here as needed.          Assessment/Plan:  1. Screening for colorectal cancer  REFERRAL TO GI FOR COLONOSCOPY   2. Low back pain, unspecified back pain laterality, unspecified chronicity, with sciatica presence unspecified  Hydrocodone-Acetaminophen (VICODIN) 5-300 MG Tab    Consent for Opiate Prescription   3. Anxiety  ALPRAZolam (XANAX) 0.5 MG Tab   4. Insomnia, unspecified type  zolpidem (AMBIEN) 10 MG Tab   5. Wellness examination  Comp Metabolic Panel    CBC WITH DIFFERENTIAL    Lipid Profile    VITAMIN D,25 HYDROXY       Return if symptoms worsen or fail to improve.    Please note that this dictation was created using voice recognition software. I have made every reasonable attempt to correct obvious errors, but I expect that there are errors of grammar and possibly content that I did not discover before finalizing the note.

## 2019-04-02 NOTE — ASSESSMENT & PLAN NOTE
Anxiety is stable.  Due for refills of xanax.  Is aware that she is not to take xanax with pain medication.

## 2019-04-02 NOTE — TELEPHONE ENCOUNTER
MEDICATION PRIOR AUTHORIZATION NEEDED:    1. Name of Medication: Hydrocodone/acetaminophen 5-300 tb    2. Requested By (Name of Pharmacy): [facility]     3. Is insurance on file current? Yes    4. What is the name & phone number of the 3rd party payor? N/a

## 2019-04-18 DIAGNOSIS — M54.5 LOW BACK PAIN, UNSPECIFIED BACK PAIN LATERALITY, UNSPECIFIED CHRONICITY, WITH SCIATICA PRESENCE UNSPECIFIED: ICD-10-CM

## 2019-04-20 ENCOUNTER — OFFICE VISIT (OUTPATIENT)
Dept: URGENT CARE | Facility: PHYSICIAN GROUP | Age: 64
End: 2019-04-20
Payer: COMMERCIAL

## 2019-04-20 VITALS
WEIGHT: 173 LBS | TEMPERATURE: 98.2 F | RESPIRATION RATE: 14 BRPM | HEIGHT: 65 IN | OXYGEN SATURATION: 95 % | SYSTOLIC BLOOD PRESSURE: 142 MMHG | BODY MASS INDEX: 28.82 KG/M2 | DIASTOLIC BLOOD PRESSURE: 90 MMHG | HEART RATE: 82 BPM

## 2019-04-20 DIAGNOSIS — B00.1 COLD SORE: ICD-10-CM

## 2019-04-20 PROCEDURE — 99214 OFFICE O/P EST MOD 30 MIN: CPT | Performed by: PHYSICIAN ASSISTANT

## 2019-04-20 RX ORDER — VALACYCLOVIR HYDROCHLORIDE 1 G/1
2000 TABLET, FILM COATED ORAL 2 TIMES DAILY
Qty: 4 TAB | Refills: 0 | Status: SHIPPED | OUTPATIENT
Start: 2019-04-20 | End: 2019-04-21

## 2019-04-20 ASSESSMENT — ENCOUNTER SYMPTOMS
FATIGUE: 0
COUGH: 0
SORE THROAT: 0
FEVER: 0

## 2019-04-20 NOTE — PROGRESS NOTES
Subjective:   Lurdes Ramon is a 63 y.o. female who presents for Blister (x 3 weeks, blisters around mouth, oozing, painful, redness)        Rash   This is a new problem. The current episode started 1 to 4 weeks ago (3 weeks). The problem has been gradually worsening since onset. The affected locations include the lips. The rash is characterized by blistering and burning. She was exposed to nothing. Pertinent negatives include no congestion, cough, facial edema, fatigue, fever or sore throat. Past treatments include topical steroids and antibiotic cream (aquaphor, antifungal). The treatment provided no relief. There is no history of allergies, asthma, eczema or varicella.     Review of Systems   Constitutional: Negative for fatigue and fever.   HENT: Negative for congestion and sore throat.    Respiratory: Negative for cough.    Skin: Positive for rash.       PMH:  has a past medical history of Back pain and Insomnia.  MEDS:   Current Outpatient Prescriptions:   •  valacyclovir (VALTREX) 1 GM Tab, Take 2 Tabs by mouth 2 times a day for 1 day., Disp: 4 Tab, Rfl: 0  •  Multiple Vitamin (MULTI-VITAMINS) Tab, Take 1 tablet by mouth., Disp: , Rfl:   •  zolpidem (AMBIEN) 10 MG Tab, Take 1 Tab by mouth at bedtime as needed for Sleep for up to 30 days., Disp: 30 Tab, Rfl: 5  •  estradiol (VIVELLE DOT) 0.1 MG/24HR PATCH BIWEEKLY, APPLY 1 PATCH EXTERNALLY TO THE SKIN 2 TIMES A WEEK AS DIRECTED (Patient taking differently: Apply 1 Patch to skin as directed 2X A WEEK. APPLY 1 PATCH EXTERNALLY TO THE SKIN 2 TIMES A WEEK AS DIRECTED), Disp: 24 Patch, Rfl: 3  •  ibuprofen (MOTRIN) 800 MG Tab, TK1  T PO Q 6 H PRN P, Disp: , Rfl: 0  •  naproxen (ANAPROX) 220 MG tablet, Take 220 mg by mouth., Disp: , Rfl:   •  ALPRAZolam (XANAX) 0.5 MG Tab, Take 1 Tab by mouth at bedtime as needed for Sleep for up to 30 days., Disp: 30 Tab, Rfl: 5  •  Hydrocodone-Acetaminophen (VICODIN) 5-300 MG Tab, Take 1 Tab by mouth 2 times a day as needed  "for up to 30 days., Disp: 60 Tab, Rfl: 0  •  acetaminophen (TYLENOL) 500 MG Tab, Take 500-1,000 mg by mouth every 6 hours as needed for Moderate Pain., Disp: , Rfl:   ALLERGIES:   Allergies   Allergen Reactions   • Pcn [Penicillins] Hives   • Sulfa Drugs Hives   • Clindamycin Shortness of Breath and Swelling     SURGHX:   Past Surgical History:   Procedure Laterality Date   • HYSTERECTOMY, TOTAL ABDOMINAL  1994   • DENTAL EXTRACTION(S)     • LITHOTRIPSY     • OTHER ORTHOPEDIC SURGERY      back surgery x 2     SOCHX:  reports that she quit smoking about 22 years ago. Her smoking use included Cigarettes. She has a 15.00 pack-year smoking history. She has never used smokeless tobacco. She reports that she drinks alcohol. She reports that she does not use drugs.  FH: Family history was reviewed, no pertinent findings to report   Objective:   /90   Pulse 82   Temp 36.8 °C (98.2 °F) (Temporal)   Resp 14   Ht 1.651 m (5' 5\")   Wt 78.5 kg (173 lb)   SpO2 95%   BMI 28.79 kg/m²   Physical Exam   Constitutional: She appears well-developed and well-nourished.  Non-toxic appearance. No distress.   HENT:   Head: Normocephalic and atraumatic.       Right Ear: External ear normal.   Left Ear: External ear normal.   Nose: Nose normal.   Mouth/Throat: Uvula is midline, oropharynx is clear and moist and mucous membranes are normal.   Several small yellow vesicles on erythematous base in areas marked.   Neck: Neck supple.   Pulmonary/Chest: Effort normal. No respiratory distress.   Neurological: She is alert.   Skin: Skin is warm and dry. Capillary refill takes less than 2 seconds.   Psychiatric: She has a normal mood and affect. Her speech is normal and behavior is normal. Judgment and thought content normal. Cognition and memory are normal.   Vitals reviewed.        Assessment/Plan:   1. Cold sore  - valacyclovir (VALTREX) 1 GM Tab; Take 2 Tabs by mouth 2 times a day for 1 day.  Dispense: 4 Tab; Refill: 0    Lesions have " not responded to topical CS or antifungals.  Additionally Aquaphor is not improving symptoms.  Lesions inconsistent with impetigo.      Pt does not have a hx of cold sores or known recent exposure, however pt's lesions appear herpetic. I will trial her on Valtrex.  If no improvement f/u w/ PCP or RTC for reevaluation.    Differential diagnosis, natural history, supportive care, and indications for immediate follow-up discussed.

## 2019-04-22 ENCOUNTER — OFFICE VISIT (OUTPATIENT)
Dept: URGENT CARE | Facility: PHYSICIAN GROUP | Age: 64
End: 2019-04-22
Payer: COMMERCIAL

## 2019-04-22 ENCOUNTER — HOSPITAL ENCOUNTER (OUTPATIENT)
Facility: MEDICAL CENTER | Age: 64
End: 2019-04-22
Attending: PHYSICIAN ASSISTANT
Payer: COMMERCIAL

## 2019-04-22 VITALS
WEIGHT: 173 LBS | HEART RATE: 78 BPM | BODY MASS INDEX: 28.82 KG/M2 | HEIGHT: 65 IN | OXYGEN SATURATION: 95 % | DIASTOLIC BLOOD PRESSURE: 84 MMHG | RESPIRATION RATE: 16 BRPM | SYSTOLIC BLOOD PRESSURE: 138 MMHG | TEMPERATURE: 97.2 F

## 2019-04-22 DIAGNOSIS — B00.1 COLD SORE: ICD-10-CM

## 2019-04-22 DIAGNOSIS — B00.1 COLD SORE: Primary | ICD-10-CM

## 2019-04-22 PROCEDURE — 99214 OFFICE O/P EST MOD 30 MIN: CPT | Performed by: PHYSICIAN ASSISTANT

## 2019-04-22 PROCEDURE — 87529 HSV DNA AMP PROBE: CPT | Mod: 91

## 2019-04-22 RX ORDER — VALACYCLOVIR HYDROCHLORIDE 1 G/1
1000 TABLET, FILM COATED ORAL 2 TIMES DAILY
Qty: 20 TAB | Refills: 0 | Status: SHIPPED | OUTPATIENT
Start: 2019-04-22 | End: 2019-04-29 | Stop reason: SDUPTHER

## 2019-04-22 ASSESSMENT — ENCOUNTER SYMPTOMS
FATIGUE: 0
COUGH: 0
FEVER: 0
SORE THROAT: 0

## 2019-04-22 NOTE — PROGRESS NOTES
Subjective:      Lurdes Ramon is a 63 y.o. female who presents with Blisters (Blistering on bottom lip )    PMH:  has a past medical history of Back pain and Insomnia.  MEDS:   Current Outpatient Prescriptions:   •  valacyclovir (VALTREX) 1 GM Tab, Take 1 Tab by mouth 2 times a day., Disp: 20 Tab, Rfl: 0  •  ibuprofen (MOTRIN) 800 MG Tab, TK1  T PO Q 6 H PRN P, Disp: , Rfl: 0  •  Multiple Vitamin (MULTI-VITAMINS) Tab, Take 1 tablet by mouth., Disp: , Rfl:   •  naproxen (ANAPROX) 220 MG tablet, Take 220 mg by mouth., Disp: , Rfl:   •  ALPRAZolam (XANAX) 0.5 MG Tab, Take 1 Tab by mouth at bedtime as needed for Sleep for up to 30 days., Disp: 30 Tab, Rfl: 5  •  zolpidem (AMBIEN) 10 MG Tab, Take 1 Tab by mouth at bedtime as needed for Sleep for up to 30 days., Disp: 30 Tab, Rfl: 5  •  Hydrocodone-Acetaminophen (VICODIN) 5-300 MG Tab, Take 1 Tab by mouth 2 times a day as needed for up to 30 days., Disp: 60 Tab, Rfl: 0  •  estradiol (VIVELLE DOT) 0.1 MG/24HR PATCH BIWEEKLY, APPLY 1 PATCH EXTERNALLY TO THE SKIN 2 TIMES A WEEK AS DIRECTED (Patient taking differently: Apply 1 Patch to skin as directed 2X A WEEK. APPLY 1 PATCH EXTERNALLY TO THE SKIN 2 TIMES A WEEK AS DIRECTED), Disp: 24 Patch, Rfl: 3  •  acetaminophen (TYLENOL) 500 MG Tab, Take 500-1,000 mg by mouth every 6 hours as needed for Moderate Pain., Disp: , Rfl:   ALLERGIES:   Allergies   Allergen Reactions   • Pcn [Penicillins] Hives   • Sulfa Drugs Hives   • Clindamycin Shortness of Breath and Swelling     SURGHX:   Past Surgical History:   Procedure Laterality Date   • HYSTERECTOMY, TOTAL ABDOMINAL  1994   • DENTAL EXTRACTION(S)     • LITHOTRIPSY     • OTHER ORTHOPEDIC SURGERY      back surgery x 2     SOCHX:  reports that she quit smoking about 22 years ago. Her smoking use included Cigarettes. She has a 15.00 pack-year smoking history. She has never used smokeless tobacco. She reports that she drinks alcohol. She reports that she does not use  "drugs.  FH: Reviewed with patient, not pertinent to this visit.           Patient presents with:  Blisters: Blistering on bottom lip.     Patient presents to clinic today with complaint of continuing blistering on her bottom lip.  Patient states rash started about 3 weeks ago with dry skin around her mouth, patient has been using Aquaphor lotion for that then for 5 days ago she woke up with blisters on her bottom lip.  Patient was seen in urgent care and given a prescription for Valtrex which did help considerably though she only received 4 pills.  Patient is also using Abreva with improvement but wanted to know if she could have more oral medication for the her cold sores.  Patient denies any rash or cold sore anywhere else.  Patient denies any honey colored crusting on blisters.      Blister   This is a new problem. The current episode started in the past 7 days. The problem occurs constantly. The problem has been gradually worsening. Associated symptoms include a rash. Pertinent negatives include no congestion, coughing, fatigue, fever or sore throat. The symptoms are aggravated by drinking and eating. Treatments tried: acyclovir, abreva. The treatment provided moderate relief.       Review of Systems   Constitutional: Negative for fatigue and fever.   HENT: Negative for congestion and sore throat.    Respiratory: Negative for cough.    Skin: Positive for rash.   All other systems reviewed and are negative.         Objective:     /84   Pulse 78   Temp 36.2 °C (97.2 °F) (Temporal)   Resp 16   Ht 1.651 m (5' 5\")   Wt 78.5 kg (173 lb)   SpO2 95%   BMI 28.79 kg/m²      Physical Exam   Constitutional: She is oriented to person, place, and time. She appears well-developed and well-nourished.   HENT:   Head: Normocephalic and atraumatic.       Nose: Nose normal.   Eyes: Pupils are equal, round, and reactive to light. Conjunctivae and EOM are normal.   Neck: Normal range of motion. Neck supple. "   Cardiovascular: Normal rate, regular rhythm and normal heart sounds.    Pulmonary/Chest: Effort normal and breath sounds normal.   Abdominal: Soft.   Musculoskeletal: Normal range of motion.   Neurological: She is alert and oriented to person, place, and time. Gait normal.   Skin: Skin is warm and dry. Capillary refill takes less than 2 seconds.   Psychiatric: She has a normal mood and affect.   Nursing note and vitals reviewed.              Assessment/Plan:     1. Cold sore  valacyclovir (VALTREX) 1 GM Tab    HSV 1/2 By PCR(Herpes)+[id number]     Viral culture sent to lab at pt request.  Will call with results.     PT should follow up with PCP in 1-2 days for re-evaluation if symptoms have not improved.  Discussed red flags and reasons to return to UC or ED.  Pt and/or family verbalized understanding of diagnosis and follow up instructions and was offered informational handout on diagnosis.  PT discharged.

## 2019-04-23 ENCOUNTER — TELEPHONE (OUTPATIENT)
Dept: MEDICAL GROUP | Facility: PHYSICIAN GROUP | Age: 64
End: 2019-04-23

## 2019-04-23 ENCOUNTER — HOSPITAL ENCOUNTER (OUTPATIENT)
Dept: LAB | Facility: MEDICAL CENTER | Age: 64
End: 2019-04-23
Attending: NURSE PRACTITIONER
Payer: COMMERCIAL

## 2019-04-23 DIAGNOSIS — Z00.00 WELLNESS EXAMINATION: ICD-10-CM

## 2019-04-23 LAB
25(OH)D3 SERPL-MCNC: 22 NG/ML (ref 30–100)
ALBUMIN SERPL BCP-MCNC: 4.1 G/DL (ref 3.2–4.9)
ALBUMIN/GLOB SERPL: 1.4 G/DL
ALP SERPL-CCNC: 71 U/L (ref 30–99)
ALT SERPL-CCNC: 17 U/L (ref 2–50)
ANION GAP SERPL CALC-SCNC: 6 MMOL/L (ref 0–11.9)
AST SERPL-CCNC: 17 U/L (ref 12–45)
BASOPHILS # BLD AUTO: 0.6 % (ref 0–1.8)
BASOPHILS # BLD: 0.04 K/UL (ref 0–0.12)
BILIRUB SERPL-MCNC: 0.8 MG/DL (ref 0.1–1.5)
BUN SERPL-MCNC: 12 MG/DL (ref 8–22)
CALCIUM SERPL-MCNC: 9.2 MG/DL (ref 8.5–10.5)
CHLORIDE SERPL-SCNC: 103 MMOL/L (ref 96–112)
CHOLEST SERPL-MCNC: 181 MG/DL (ref 100–199)
CO2 SERPL-SCNC: 27 MMOL/L (ref 20–33)
CREAT SERPL-MCNC: 0.62 MG/DL (ref 0.5–1.4)
EOSINOPHIL # BLD AUTO: 0.06 K/UL (ref 0–0.51)
EOSINOPHIL NFR BLD: 0.9 % (ref 0–6.9)
ERYTHROCYTE [DISTWIDTH] IN BLOOD BY AUTOMATED COUNT: 43.2 FL (ref 35.9–50)
FASTING STATUS PATIENT QL REPORTED: NORMAL
GLOBULIN SER CALC-MCNC: 2.9 G/DL (ref 1.9–3.5)
GLUCOSE SERPL-MCNC: 105 MG/DL (ref 65–99)
HCT VFR BLD AUTO: 43.8 % (ref 37–47)
HDLC SERPL-MCNC: 57 MG/DL
HGB BLD-MCNC: 14.6 G/DL (ref 12–16)
HSV1 DNA SPEC QL NAA+PROBE: NEGATIVE
HSV2 DNA SPEC QL NAA+PROBE: NEGATIVE
IMM GRANULOCYTES # BLD AUTO: 0.02 K/UL (ref 0–0.11)
IMM GRANULOCYTES NFR BLD AUTO: 0.3 % (ref 0–0.9)
LDLC SERPL CALC-MCNC: 107 MG/DL
LYMPHOCYTES # BLD AUTO: 1.24 K/UL (ref 1–4.8)
LYMPHOCYTES NFR BLD: 17.7 % (ref 22–41)
MCH RBC QN AUTO: 32.4 PG (ref 27–33)
MCHC RBC AUTO-ENTMCNC: 33.3 G/DL (ref 33.6–35)
MCV RBC AUTO: 97.3 FL (ref 81.4–97.8)
MONOCYTES # BLD AUTO: 0.54 K/UL (ref 0–0.85)
MONOCYTES NFR BLD AUTO: 7.7 % (ref 0–13.4)
NEUTROPHILS # BLD AUTO: 5.09 K/UL (ref 2–7.15)
NEUTROPHILS NFR BLD: 72.8 % (ref 44–72)
NRBC # BLD AUTO: 0 K/UL
NRBC BLD-RTO: 0 /100 WBC
PLATELET # BLD AUTO: 245 K/UL (ref 164–446)
PMV BLD AUTO: 10.7 FL (ref 9–12.9)
POTASSIUM SERPL-SCNC: 3.5 MMOL/L (ref 3.6–5.5)
PROT SERPL-MCNC: 7 G/DL (ref 6–8.2)
RBC # BLD AUTO: 4.5 M/UL (ref 4.2–5.4)
SODIUM SERPL-SCNC: 136 MMOL/L (ref 135–145)
SPECIMEN SOURCE: NORMAL
TRIGL SERPL-MCNC: 85 MG/DL (ref 0–149)
WBC # BLD AUTO: 7 K/UL (ref 4.8–10.8)

## 2019-04-23 PROCEDURE — 36415 COLL VENOUS BLD VENIPUNCTURE: CPT

## 2019-04-23 PROCEDURE — 80053 COMPREHEN METABOLIC PANEL: CPT

## 2019-04-23 PROCEDURE — 82306 VITAMIN D 25 HYDROXY: CPT

## 2019-04-23 PROCEDURE — 80061 LIPID PANEL: CPT

## 2019-04-23 PROCEDURE — 85025 COMPLETE CBC W/AUTO DIFF WBC: CPT

## 2019-04-23 RX ORDER — HYDROCODONE BITARTRATE AND ACETAMINOPHEN 5; 300 MG/1; MG/1
1 TABLET ORAL EVERY 4 HOURS PRN
Qty: 42 TAB | Refills: 0 | Status: SHIPPED | OUTPATIENT
Start: 2019-04-23 | End: 2019-06-27 | Stop reason: SDUPTHER

## 2019-04-29 ENCOUNTER — OFFICE VISIT (OUTPATIENT)
Dept: MEDICAL GROUP | Facility: PHYSICIAN GROUP | Age: 64
End: 2019-04-29
Payer: COMMERCIAL

## 2019-04-29 VITALS
SYSTOLIC BLOOD PRESSURE: 132 MMHG | TEMPERATURE: 98.1 F | WEIGHT: 175 LBS | RESPIRATION RATE: 16 BRPM | DIASTOLIC BLOOD PRESSURE: 88 MMHG | BODY MASS INDEX: 29.16 KG/M2 | OXYGEN SATURATION: 93 % | HEIGHT: 65 IN | HEART RATE: 74 BPM

## 2019-04-29 DIAGNOSIS — B00.1 COLD SORE: ICD-10-CM

## 2019-04-29 DIAGNOSIS — E55.9 AVITAMINOSIS D: ICD-10-CM

## 2019-04-29 DIAGNOSIS — Z00.00 ENCOUNTER FOR WELLNESS EXAMINATION: ICD-10-CM

## 2019-04-29 PROBLEM — Z12.11 SCREENING FOR COLORECTAL CANCER: Status: RESOLVED | Noted: 2019-04-02 | Resolved: 2019-04-29

## 2019-04-29 PROBLEM — Z12.12 SCREENING FOR COLORECTAL CANCER: Status: RESOLVED | Noted: 2019-04-02 | Resolved: 2019-04-29

## 2019-04-29 PROCEDURE — 99214 OFFICE O/P EST MOD 30 MIN: CPT | Performed by: NURSE PRACTITIONER

## 2019-04-29 RX ORDER — VALACYCLOVIR HYDROCHLORIDE 1 G/1
1000 TABLET, FILM COATED ORAL 2 TIMES DAILY PRN
Qty: 20 TAB | Refills: 5 | Status: SHIPPED | OUTPATIENT
Start: 2019-04-29 | End: 2019-07-25 | Stop reason: SDUPTHER

## 2019-04-29 RX ORDER — ERGOCALCIFEROL 1.25 MG/1
CAPSULE ORAL
Qty: 16 CAP | Refills: 0 | Status: SHIPPED | OUTPATIENT
Start: 2019-04-29 | End: 2019-06-27

## 2019-04-29 NOTE — PROGRESS NOTES
Chief Complaint   Patient presents with   • Annual Exam   • Results     Lab [facility]        HISTORY OF PRESENT ILLNESS: Patient is a 64 y.o. female established patient who presents today to discuss the following issues:    Avitaminosis D  Lab results reviewed, ergocalciferol sent to her pharmacy.    Encounter for wellness examination  Is her for her annual wellness visit.    Cold sore  Has had issues with small blisters around her mouth. Was seen in urgent care twice and definitely noticed that acyclovir worked.  Discussed plan for her to keep refills on hand.      Patient Active Problem List    Diagnosis Date Noted   • Avitaminosis D 04/29/2019   • Cold sore 04/29/2019   • Vertigo 01/17/2018   • Encounter for wellness examination 04/04/2017   • Hormone replacement therapy (HRT) 01/25/2017   • Insomnia 10/26/2016   • Low back pain 10/26/2016   • Anxiety 10/26/2016       Allergies:Pcn [penicillins]; Sulfa drugs; and Clindamycin    Current Outpatient Prescriptions   Medication Sig Dispense Refill   • ergocalciferol (DRISDOL) 92548 UNIT capsule Take 1 cap by mouth every day for 10 days, and then take 1 cap by mouth every Tuesday and Saturday until prescription is completed. 16 Cap 0   • valacyclovir (VALTREX) 1 GM Tab Take 1 Tab by mouth 2 times a day as needed. 20 Tab 5   • Multiple Vitamin (MULTI-VITAMINS) Tab Take 1 tablet by mouth.     • zolpidem (AMBIEN) 10 MG Tab Take 1 Tab by mouth at bedtime as needed for Sleep for up to 30 days. 30 Tab 5   • estradiol (VIVELLE DOT) 0.1 MG/24HR PATCH BIWEEKLY APPLY 1 PATCH EXTERNALLY TO THE SKIN 2 TIMES A WEEK AS DIRECTED (Patient taking differently: Apply 1 Patch to skin as directed 2X A WEEK. APPLY 1 PATCH EXTERNALLY TO THE SKIN 2 TIMES A WEEK AS DIRECTED) 24 Patch 3   • Hydrocodone-Acetaminophen (VICODIN) 5-300 MG Tab Take 1 Tab by mouth every four hours as needed for up to 7 days. 42 Tab 0   • naproxen (ANAPROX) 220 MG tablet Take 220 mg by mouth.     • ALPRAZolam (XANAX) 0.5 MG  Tab Take 1 Tab by mouth at bedtime as needed for Sleep for up to 30 days. 30 Tab 5   • acetaminophen (TYLENOL) 500 MG Tab Take 500-1,000 mg by mouth every 6 hours as needed for Moderate Pain.       No current facility-administered medications for this visit.        Social History   Substance Use Topics   • Smoking status: Former Smoker     Packs/day: 1.00     Years: 15.00     Types: Cigarettes     Quit date: 10/26/1996   • Smokeless tobacco: Never Used   • Alcohol use 0.0 oz/week      Comment: socially       Family Status   Relation Status   • Sis (Not Specified)   • Fa (Not Specified)   • Bro (Not Specified)   • Mo (Not Specified)     Family History   Problem Relation Age of Onset   • Cancer Sister 58        colorectal   • Heart Attack Father    • Heart Disease Brother 61   • Cancer Mother         brain       Review of Systems:   Constitutional: Negative for fever, chills, weight loss and malaise/fatigue.   HENT: Negative for ear pain, nosebleeds, congestion, sore throat and neck pain.    Eyes: Negative for blurred vision.   Respiratory: Negative for cough, sputum production, shortness of breath and wheezing.    Cardiovascular: Negative for chest pain, palpitations, orthopnea and leg swelling.   Gastrointestinal: Negative for heartburn, nausea, vomiting and abdominal pain.   Genitourinary: Negative for dysuria, urgency and frequency.   Musculoskeletal: Negative for myalgias, joint pain, and back pain.  Skin: Negative for rash and itching. Positive for resolved vesicular outbreak around mouth.  Neurological: Negative for dizziness, tingling, tremors, sensory change, focal weakness and headaches.   Endo/Heme/Allergies: Does not bruise/bleed easily.   Psychiatric/Behavioral: Negative for depression, suicidal ideas and memory loss.  The patient is not nervous/anxious and does not have insomnia.    All other systems reviewed and are negative except as in HPI.    Exam:  /88   Pulse 74   Temp 36.7 °C (98.1 °F)    "Resp 16   Ht 1.651 m (5' 5\")   Wt 79.4 kg (175 lb)   SpO2 93%   General:  Well nourished, well developed female in NAD  Head: Grossly normal.  Neck: Supple without JVD or bruit. Thyroid is not enlarged.  Pulmonary: Clear to ausculation. Normal effort. No rales, ronchi, or wheezing.  Cardiovascular: Regular rate and rhythm without murmur.   Abdomen:  Soft, nontender, nondistended.  Extremities: No clubbing, cyanosis, or edema.  Skin: Intact with no obvious rashes or lesions.  Neuro: Grossly intact.  Psych: Alert and oriented x 3.  Mood and affect appropriate.    Medical decision-making and discussion: Lurdes is here today for follow-up.  Her test results were reviewed, ergocalciferol was sent to her pharmacy, and her Valtrex was refilled.  She will follow-up here as needed.          Assessment/Plan:  1. Avitaminosis D  ergocalciferol (DRISDOL) 40546 UNIT capsule   2. Encounter for wellness examination     3. Cold sore  valacyclovir (VALTREX) 1 GM Tab       Return if symptoms worsen or fail to improve.    Please note that this dictation was created using voice recognition software. I have made every reasonable attempt to correct obvious errors, but I expect that there are errors of grammar and possibly content that I did not discover before finalizing the note.  "

## 2019-04-29 NOTE — ASSESSMENT & PLAN NOTE
Has had issues with small blisters around her mouth. Was seen in urgent care twice and definitely noticed that acyclovir worked.  Discussed plan for her to keep refills on hand.

## 2019-05-02 ENCOUNTER — HOSPITAL ENCOUNTER (OUTPATIENT)
Dept: RADIOLOGY | Facility: MEDICAL CENTER | Age: 64
End: 2019-05-02
Attending: NURSE PRACTITIONER
Payer: COMMERCIAL

## 2019-05-02 DIAGNOSIS — Z12.31 VISIT FOR SCREENING MAMMOGRAM: ICD-10-CM

## 2019-05-02 PROCEDURE — 77063 BREAST TOMOSYNTHESIS BI: CPT

## 2019-06-06 ENCOUNTER — TELEMEDICINE2 (OUTPATIENT)
Dept: URGENT CARE | Facility: CLINIC | Age: 64
End: 2019-06-06

## 2019-06-06 ENCOUNTER — APPOINTMENT (OUTPATIENT)
Dept: URGENT CARE | Facility: PHYSICIAN GROUP | Age: 64
End: 2019-06-06

## 2019-06-06 DIAGNOSIS — H10.9 BACTERIAL CONJUNCTIVITIS OF BOTH EYES: ICD-10-CM

## 2019-06-06 DIAGNOSIS — B96.89 BACTERIAL CONJUNCTIVITIS OF BOTH EYES: ICD-10-CM

## 2019-06-06 PROCEDURE — 99213 OFFICE O/P EST LOW 20 MIN: CPT | Performed by: FAMILY MEDICINE

## 2019-06-06 RX ORDER — ZOLPIDEM TARTRATE 10 MG/1
TABLET ORAL
Refills: 5 | COMMUNITY
Start: 2019-05-28 | End: 2019-11-03 | Stop reason: SDUPTHER

## 2019-06-06 RX ORDER — TOBRAMYCIN 3 MG/ML
1 SOLUTION/ DROPS OPHTHALMIC 4 TIMES DAILY
Qty: 2 ML | Refills: 0 | Status: SHIPPED | OUTPATIENT
Start: 2019-06-06 | End: 2019-06-13

## 2019-06-06 NOTE — PROGRESS NOTES
"    Chief Complaint   Patient presents with   • Conjunctivitis           CC:  here for \"pink eye\"      Patient comes in complaining of bilateral eye discharge for one day.   C/o white to yellow discharge from the eye.   reports no eye pain, just some itchiness as well as irritation.  visual acuity is unchanged.   has no concurrent fever, chills, cough or upper airway congestion. No sinus pain or pressure.   has not tried anything for this. Nothing seems to make it better or worse.          Social History   Substance Use Topics   • Smoking status: Former Smoker     Packs/day: 1.00     Years: 15.00     Types: Cigarettes     Quit date: 10/26/1996   • Smokeless tobacco: Never Used   • Alcohol use 0.0 oz/week      Comment: socially            Current Outpatient Prescriptions on File Prior to Visit   Medication Sig Dispense Refill   • valacyclovir (VALTREX) 1 GM Tab Take 1 Tab by mouth 2 times a day as needed. 20 Tab 5   • estradiol (VIVELLE DOT) 0.1 MG/24HR PATCH BIWEEKLY APPLY 1 PATCH EXTERNALLY TO THE SKIN 2 TIMES A WEEK AS DIRECTED (Patient taking differently: Apply 1 Patch to skin as directed 2X A WEEK. APPLY 1 PATCH EXTERNALLY TO THE SKIN 2 TIMES A WEEK AS DIRECTED) 24 Patch 3   • ergocalciferol (DRISDOL) 84458 UNIT capsule Take 1 cap by mouth every day for 10 days, and then take 1 cap by mouth every Tuesday and Saturday until prescription is completed. 16 Cap 0   • Multiple Vitamin (MULTI-VITAMINS) Tab Take 1 tablet by mouth.     • naproxen (ANAPROX) 220 MG tablet Take 220 mg by mouth.     • acetaminophen (TYLENOL) 500 MG Tab Take 500-1,000 mg by mouth every 6 hours as needed for Moderate Pain.       No current facility-administered medications on file prior to visit.            Past Medical History:   Diagnosis Date   • Back pain    • Insomnia              ROS          Review of Systems   Constitutional: Negative for fever, chills and weight loss.   HENT - denies cough, ear pain, congestion, sore throat  Eyes: " denies vision changes, + discharge  Respiratory: Negative for cough and wheezing.    Cardiovascular: Negative for chest pain or PND.   Gastrointestinal:  No abdominal pain,  nausea, vomiting, diarrhea.  Negative for  blood in stool.    - no discharge, dysuria, frequency.      Neurological: Negative for dizziness and headaches.   musculoskeletal - denies myalgias, calf pain  Psych - denies anxiety/depression/mood changes.  Skin: no itching or rash  All other systems reviewed and are negative.    Objective:    There were no vitals taken for this visit.    EXAM      HEENT - PERRLA, EOMI.  There is bilateral conjunctival injection        Neuro - alert and oriented x3.    Lungs - no resp distress     Psych - behavior normal    assessment & plan           1. Bacterial conjunctivitis of both eyes     - tobramycin (TOBREX) 0.3 % Solution ophthalmic solution; Place 1 Drop in both eyes 4 times a day for 7 days.  Dispense: 2 mL; Refill: 0

## 2019-06-27 ENCOUNTER — OFFICE VISIT (OUTPATIENT)
Dept: MEDICAL GROUP | Facility: PHYSICIAN GROUP | Age: 64
End: 2019-06-27
Payer: COMMERCIAL

## 2019-06-27 VITALS
WEIGHT: 174 LBS | HEIGHT: 65 IN | OXYGEN SATURATION: 95 % | BODY MASS INDEX: 28.99 KG/M2 | RESPIRATION RATE: 16 BRPM | HEART RATE: 85 BPM | TEMPERATURE: 98.9 F | DIASTOLIC BLOOD PRESSURE: 80 MMHG | SYSTOLIC BLOOD PRESSURE: 122 MMHG

## 2019-06-27 DIAGNOSIS — M54.5 LOW BACK PAIN, UNSPECIFIED BACK PAIN LATERALITY, UNSPECIFIED CHRONICITY, WITH SCIATICA PRESENCE UNSPECIFIED: ICD-10-CM

## 2019-06-27 PROCEDURE — 99214 OFFICE O/P EST MOD 30 MIN: CPT | Performed by: NURSE PRACTITIONER

## 2019-06-27 RX ORDER — POLYETHYLENE GLYCOL 3350, SODIUM CHLORIDE, SODIUM BICARBONATE, POTASSIUM CHLORIDE 420; 11.2; 5.72; 1.48 G/4L; G/4L; G/4L; G/4L
POWDER, FOR SOLUTION ORAL
Refills: 0 | COMMUNITY
Start: 2019-05-13 | End: 2019-06-27

## 2019-06-27 RX ORDER — HYDROCODONE BITARTRATE AND ACETAMINOPHEN 5; 325 MG/1; MG/1
TABLET ORAL
Refills: 0 | COMMUNITY
Start: 2019-06-13 | End: 2019-08-07

## 2019-06-27 RX ORDER — HYDROCODONE BITARTRATE AND ACETAMINOPHEN 5; 300 MG/1; MG/1
1 TABLET ORAL EVERY 4 HOURS PRN
Qty: 42 TAB | Refills: 0 | Status: SHIPPED | OUTPATIENT
Start: 2019-06-27 | End: 2019-08-07 | Stop reason: SDUPTHER

## 2019-06-27 RX ORDER — ALPRAZOLAM 0.5 MG/1
TABLET ORAL
Refills: 5 | COMMUNITY
Start: 2019-06-20 | End: 2019-12-26 | Stop reason: SDUPTHER

## 2019-06-27 NOTE — PROGRESS NOTES
Chief Complaint   Patient presents with   • Medication Refill       HISTORY OF PRESENT ILLNESS: Patient is a 64 y.o. female established patient who presents today to discuss the following issues:    Low back pain  Chronic in nature, stable.  Would like a refill of Norco.  Recently used some for an oral surgery, but generally takes it rarely as needed.   reviewed.      Patient Active Problem List    Diagnosis Date Noted   • Avitaminosis D 04/29/2019   • Cold sore 04/29/2019   • Vertigo 01/17/2018   • Encounter for wellness examination 04/04/2017   • Hormone replacement therapy (HRT) 01/25/2017   • Insomnia 10/26/2016   • Low back pain 10/26/2016   • Anxiety 10/26/2016       Allergies:Pcn [penicillins]; Sulfa drugs; and Clindamycin    Current Outpatient Prescriptions   Medication Sig Dispense Refill   • HYDROcodone-acetaminophen (NORCO) 5-325 MG Tab per tablet TK 1 T PO Q 4-6 H PRF PAIN FOR 3 DAYS  0   • HYDROcodone-Acetaminophen (VICODIN) 5-300 MG Tab Take 1 Tab by mouth every four hours as needed for up to 7 days. 42 Tab 0   • zolpidem (AMBIEN) 10 MG Tab TK 1 T PO HS PRF SLEEP  5   • valacyclovir (VALTREX) 1 GM Tab Take 1 Tab by mouth 2 times a day as needed. 20 Tab 5   • Multiple Vitamin (MULTI-VITAMINS) Tab Take 1 tablet by mouth.     • estradiol (VIVELLE DOT) 0.1 MG/24HR PATCH BIWEEKLY APPLY 1 PATCH EXTERNALLY TO THE SKIN 2 TIMES A WEEK AS DIRECTED (Patient taking differently: Apply 1 Patch to skin as directed 2X A WEEK. APPLY 1 PATCH EXTERNALLY TO THE SKIN 2 TIMES A WEEK AS DIRECTED) 24 Patch 3   • ALPRAZolam (XANAX) 0.5 MG Tab TK 1 T PO HS PRF SLEEP  5   • ALPRAZOLAM PO Take  by mouth.     • naproxen (ANAPROX) 220 MG tablet Take 220 mg by mouth.     • acetaminophen (TYLENOL) 500 MG Tab Take 500-1,000 mg by mouth every 6 hours as needed for Moderate Pain.       No current facility-administered medications for this visit.        Social History   Substance Use Topics   • Smoking status: Former Smoker      "Packs/day: 1.00     Years: 15.00     Types: Cigarettes     Quit date: 10/26/1996   • Smokeless tobacco: Never Used   • Alcohol use 0.0 oz/week      Comment: socially       Family Status   Relation Status   • Sis (Not Specified)   • Fa (Not Specified)   • Bro (Not Specified)   • Mo (Not Specified)     Family History   Problem Relation Age of Onset   • Cancer Sister 58        colorectal   • Heart Attack Father    • Heart Disease Brother 61   • Cancer Mother         brain       Review of Systems:   Constitutional: Negative for fever, chills, weight loss and malaise/fatigue.   HENT: Negative for ear pain, nosebleeds, congestion, sore throat and neck pain.    Eyes: Negative for blurred vision.   Respiratory: Negative for cough, sputum production, shortness of breath and wheezing.    Cardiovascular: Negative for chest pain, palpitations, orthopnea and leg swelling.   Gastrointestinal: Negative for heartburn, nausea, vomiting and abdominal pain.   Genitourinary: Negative for dysuria, urgency and frequency.   Musculoskeletal: Negative for myalgias, joint pain. Positive for chronic low back pain.  Skin: Negative for rash and itching.   Neurological: Negative for dizziness, tingling, tremors, sensory change, focal weakness and headaches.   Endo/Heme/Allergies: Does not bruise/bleed easily.   Psychiatric/Behavioral: Negative for depression, suicidal ideas and memory loss.  The patient is not nervous/anxious and does not have insomnia.    All other systems reviewed and are negative except as in HPI.    Exam:  /80   Pulse 85   Temp 37.2 °C (98.9 °F)   Resp 16   Ht 1.651 m (5' 5\")   Wt 78.9 kg (174 lb)   SpO2 95%   General:  Well nourished, well developed female in NAD  Head: Grossly normal.  Neck: Supple without JVD or bruit. Thyroid is not enlarged.  Pulmonary: Clear to ausculation. Normal effort. No rales, ronchi, or wheezing.  Cardiovascular: Regular rate and rhythm without murmur.   Abdomen:  Soft, nontender, " nondistended.  Extremities: No clubbing, cyanosis, or edema.  Skin: Intact with no obvious rashes or lesions.  Neuro: Grossly intact.  Psych: Alert and oriented x 3.  Mood and affect appropriate.    Medical decision-making and discussion: Lurdes is here today for follow-up.  SHe was given a prescription for Norco, and she will follow-up here as needed.          Assessment/Plan:  1. Low back pain, unspecified back pain laterality, unspecified chronicity, with sciatica presence unspecified  HYDROcodone-Acetaminophen (VICODIN) 5-300 MG Tab       Return if symptoms worsen or fail to improve.    Please note that this dictation was created using voice recognition software. I have made every reasonable attempt to correct obvious errors, but I expect that there are errors of grammar and possibly content that I did not discover before finalizing the note.

## 2019-07-03 NOTE — ASSESSMENT & PLAN NOTE
Chronic in nature, stable.  Would like a refill of Norco.  Recently used some for an oral surgery, but generally takes it rarely as needed.   reviewed.

## 2019-07-08 ENCOUNTER — OFFICE VISIT (OUTPATIENT)
Dept: URGENT CARE | Facility: PHYSICIAN GROUP | Age: 64
End: 2019-07-08
Payer: COMMERCIAL

## 2019-07-08 VITALS
BODY MASS INDEX: 29.16 KG/M2 | HEART RATE: 85 BPM | OXYGEN SATURATION: 96 % | SYSTOLIC BLOOD PRESSURE: 124 MMHG | HEIGHT: 65 IN | DIASTOLIC BLOOD PRESSURE: 76 MMHG | RESPIRATION RATE: 16 BRPM | TEMPERATURE: 98.8 F | WEIGHT: 175 LBS

## 2019-07-08 DIAGNOSIS — H10.9 CONJUNCTIVITIS OF BOTH EYES, UNSPECIFIED CONJUNCTIVITIS TYPE: ICD-10-CM

## 2019-07-08 PROCEDURE — 99214 OFFICE O/P EST MOD 30 MIN: CPT | Performed by: PHYSICIAN ASSISTANT

## 2019-07-08 RX ORDER — TOBRAMYCIN 3 MG/ML
1 SOLUTION/ DROPS OPHTHALMIC 4 TIMES DAILY
Qty: 1 BOTTLE | Refills: 0 | Status: SHIPPED | OUTPATIENT
Start: 2019-07-08 | End: 2019-07-13

## 2019-07-09 ASSESSMENT — ENCOUNTER SYMPTOMS
EYE REDNESS: 1
EYE PAIN: 0
PHOTOPHOBIA: 0
FEVER: 0
EYE DISCHARGE: 1
BLURRED VISION: 0
COUGH: 0
DOUBLE VISION: 0

## 2019-07-24 ENCOUNTER — OFFICE VISIT (OUTPATIENT)
Dept: URGENT CARE | Facility: PHYSICIAN GROUP | Age: 64
End: 2019-07-24
Payer: COMMERCIAL

## 2019-07-24 VITALS
HEART RATE: 79 BPM | WEIGHT: 175 LBS | BODY MASS INDEX: 29.16 KG/M2 | DIASTOLIC BLOOD PRESSURE: 88 MMHG | OXYGEN SATURATION: 94 % | TEMPERATURE: 98.5 F | SYSTOLIC BLOOD PRESSURE: 132 MMHG | RESPIRATION RATE: 14 BRPM | HEIGHT: 65 IN

## 2019-07-24 DIAGNOSIS — H10.13 ALLERGIC CONJUNCTIVITIS, ACUTE, BILATERAL: ICD-10-CM

## 2019-07-24 PROCEDURE — 99214 OFFICE O/P EST MOD 30 MIN: CPT | Performed by: PHYSICIAN ASSISTANT

## 2019-07-24 RX ORDER — OLOPATADINE HYDROCHLORIDE 2 MG/ML
1 SOLUTION/ DROPS OPHTHALMIC DAILY
Qty: 1 BOTTLE | Refills: 1 | Status: SHIPPED | OUTPATIENT
Start: 2019-07-24 | End: 2021-01-06

## 2019-07-24 ASSESSMENT — ENCOUNTER SYMPTOMS
PHOTOPHOBIA: 0
BLURRED VISION: 0
DOUBLE VISION: 0
EYE PAIN: 0
EYE DISCHARGE: 1
EYE REDNESS: 1

## 2019-07-24 NOTE — PROGRESS NOTES
Subjective:      Lurdes Ramon is a 64 y.o. female who presents with Conjunctivitis (x 3 weeks, was on drops but not improving)    PMH:  has a past medical history of Back pain and Insomnia.  MEDS:   Current Outpatient Prescriptions:   •  Olopatadine HCl 0.2 % Solution, 1 Drop by Ophthalmic route every day., Disp: 1 Bottle, Rfl: 1  •  zolpidem (AMBIEN) 10 MG Tab, TK 1 T PO HS PRF SLEEP, Disp: , Rfl: 5  •  valacyclovir (VALTREX) 1 GM Tab, Take 1 Tab by mouth 2 times a day as needed., Disp: 20 Tab, Rfl: 5  •  Multiple Vitamin (MULTI-VITAMINS) Tab, Take 1 tablet by mouth., Disp: , Rfl:   •  estradiol (VIVELLE DOT) 0.1 MG/24HR PATCH BIWEEKLY, APPLY 1 PATCH EXTERNALLY TO THE SKIN 2 TIMES A WEEK AS DIRECTED (Patient taking differently: Apply 1 Patch to skin as directed 2X A WEEK. APPLY 1 PATCH EXTERNALLY TO THE SKIN 2 TIMES A WEEK AS DIRECTED), Disp: 24 Patch, Rfl: 3  •  ALPRAZolam (XANAX) 0.5 MG Tab, TK 1 T PO HS PRF SLEEP, Disp: , Rfl: 5  •  HYDROcodone-acetaminophen (NORCO) 5-325 MG Tab per tablet, TK 1 T PO Q 4-6 H PRF PAIN FOR 3 DAYS, Disp: , Rfl: 0  •  ALPRAZOLAM PO, Take  by mouth., Disp: , Rfl:   •  naproxen (ANAPROX) 220 MG tablet, Take 220 mg by mouth., Disp: , Rfl:   •  acetaminophen (TYLENOL) 500 MG Tab, Take 500-1,000 mg by mouth every 6 hours as needed for Moderate Pain., Disp: , Rfl:   ALLERGIES:   Allergies   Allergen Reactions   • Pcn [Penicillins] Hives   • Sulfa Drugs Hives   • Clindamycin Shortness of Breath and Swelling     SURGHX:   Past Surgical History:   Procedure Laterality Date   • HYSTERECTOMY, TOTAL ABDOMINAL  1994   • DENTAL EXTRACTION(S)     • LITHOTRIPSY     • OTHER ORTHOPEDIC SURGERY      back surgery x 2     SOCHX:  reports that she quit smoking about 22 years ago. Her smoking use included Cigarettes. She has a 15.00 pack-year smoking history. She has never used smokeless tobacco. She reports that she drinks alcohol. She reports that she does not use drugs.  FH: Reviewed with  "patient, not pertinent to this visit.             Patient presents with:  Conjunctivitis: x 3 weeks, was on antibiotic drops x 5 days but not improving.  Eyes are red, itchy and dry flaky discharge on lashes in the morning. PT is allergic to cats but has 2 cats and is taking care of her daughter's cats.  Pt is not taking a daily allergy medication.    PT does not wear contacts.            Conjunctivitis   This is a new problem. Episode onset: 3 weeks. The problem occurs daily. The problem has been unchanged. Exacerbated by: cats. Treatments tried: abx drops. The treatment provided no relief.       Review of Systems   Eyes: Positive for discharge (clear crusty) and redness. Negative for blurred vision, double vision, photophobia and pain.   Endo/Heme/Allergies: Positive for environmental allergies.   All other systems reviewed and are negative.         Objective:     /88   Pulse 79   Temp 36.9 °C (98.5 °F)   Resp 14   Ht 1.651 m (5' 5\")   Wt 79.4 kg (175 lb)   SpO2 94%   BMI 29.12 kg/m²      Physical Exam   Constitutional: She is oriented to person, place, and time. She appears well-developed and well-nourished. No distress.   HENT:   Head: Normocephalic.   Eyes: Pupils are equal, round, and reactive to light. EOM and lids are normal. Right eye exhibits no discharge. Left eye exhibits no discharge. Right conjunctiva is injected. Left conjunctiva is injected.   Neck: Normal range of motion. Neck supple.   Cardiovascular: Normal rate, regular rhythm and normal heart sounds.    Pulmonary/Chest: Effort normal and breath sounds normal.   Musculoskeletal: Normal range of motion.   Lymphadenopathy:     She has no cervical adenopathy.   Neurological: She is alert and oriented to person, place, and time.   Skin: Skin is warm and dry. Capillary refill takes less than 2 seconds.   Psychiatric: She has a normal mood and affect.   Nursing note and vitals reviewed.           Assessment/Plan:     1. Allergic " conjunctivitis, acute, bilateral  Olopatadine HCl 0.2 % Solution     With history of cat allergy and lack of improvement with antibiotic drops, I feel the etiology is allergic conjunctivitis.  Will send Rx for allergy drops.      PT was instructed to begin a daily OTC allergy medication for relief of allergy symptoms.  Ex: allegra, claritin, zyrtec, allerclear, etc.   Pt can also take OTC benadryl at bedtime if symptoms are keeping them awake at night.      PT can use cool/warm compress on affected area for relief of symptoms.     PT should follow up with ophthalmology in 5 days for re-evaluation if symptoms have not improved.  Discussed red flags and reasons to return to UC or ED.  Pt and/or family verbalized understanding of diagnosis and follow up instructions and was offered informational handout on diagnosis.  PT discharged.

## 2019-07-25 DIAGNOSIS — B00.1 COLD SORE: ICD-10-CM

## 2019-07-26 ENCOUNTER — TELEPHONE (OUTPATIENT)
Dept: URGENT CARE | Facility: PHYSICIAN GROUP | Age: 64
End: 2019-07-26

## 2019-07-26 RX ORDER — VALACYCLOVIR HYDROCHLORIDE 1 G/1
1000 TABLET, FILM COATED ORAL 2 TIMES DAILY PRN
Qty: 20 TAB | Refills: 2 | Status: SHIPPED | OUTPATIENT
Start: 2019-07-26 | End: 2019-09-28 | Stop reason: SDUPTHER

## 2019-08-07 ENCOUNTER — OFFICE VISIT (OUTPATIENT)
Dept: MEDICAL GROUP | Facility: PHYSICIAN GROUP | Age: 64
End: 2019-08-07
Payer: COMMERCIAL

## 2019-08-07 VITALS
DIASTOLIC BLOOD PRESSURE: 80 MMHG | HEART RATE: 84 BPM | SYSTOLIC BLOOD PRESSURE: 124 MMHG | OXYGEN SATURATION: 95 % | TEMPERATURE: 98.5 F | HEIGHT: 65 IN | WEIGHT: 174 LBS | RESPIRATION RATE: 16 BRPM | BODY MASS INDEX: 28.99 KG/M2

## 2019-08-07 DIAGNOSIS — M54.5 LOW BACK PAIN, UNSPECIFIED BACK PAIN LATERALITY, UNSPECIFIED CHRONICITY, WITH SCIATICA PRESENCE UNSPECIFIED: ICD-10-CM

## 2019-08-07 PROBLEM — Z00.00 ENCOUNTER FOR WELLNESS EXAMINATION: Status: RESOLVED | Noted: 2017-04-04 | Resolved: 2019-08-07

## 2019-08-07 PROCEDURE — 99214 OFFICE O/P EST MOD 30 MIN: CPT | Performed by: NURSE PRACTITIONER

## 2019-08-07 RX ORDER — TRIAMCINOLONE ACETONIDE 1 MG/G
CREAM TOPICAL
COMMUNITY
Start: 2016-06-24 | End: 2021-05-05

## 2019-08-07 RX ORDER — NAPROXEN SODIUM 220 MG
220 TABLET ORAL
COMMUNITY
End: 2019-08-07

## 2019-08-07 RX ORDER — HYDROCODONE BITARTRATE AND ACETAMINOPHEN 5; 300 MG/1; MG/1
1 TABLET ORAL EVERY 4 HOURS PRN
Qty: 42 TAB | Refills: 0 | Status: SHIPPED | OUTPATIENT
Start: 2019-08-07 | End: 2019-09-24 | Stop reason: SDUPTHER

## 2019-08-07 RX ORDER — ONDANSETRON 4 MG/1
4 TABLET, ORALLY DISINTEGRATING ORAL
COMMUNITY
Start: 2016-03-31 | End: 2021-01-06

## 2019-08-07 RX ORDER — ALPRAZOLAM 0.5 MG/1
TABLET ORAL
COMMUNITY
Start: 2016-07-25 | End: 2019-08-07

## 2019-08-07 RX ORDER — HYDROCODONE BITARTRATE AND ACETAMINOPHEN 5; 300 MG/1; MG/1
1-2 TABLET ORAL
COMMUNITY
Start: 2016-03-31 | End: 2021-01-06

## 2019-08-07 RX ORDER — ZOLPIDEM TARTRATE 10 MG/1
TABLET ORAL
COMMUNITY
Start: 2016-10-24 | End: 2019-08-07

## 2019-08-07 RX ORDER — ESTRADIOL 0.07 MG/D
1 FILM, EXTENDED RELEASE TRANSDERMAL
COMMUNITY
Start: 2016-03-31 | End: 2019-08-07

## 2019-08-07 NOTE — ASSESSMENT & PLAN NOTE
Would like to restart physical therapy.  Discussed options.  Will start with a referral to PT at [facility]. Would also like a refill of Norco.   reviewed.  She understands that she may need a referral to pain management in the near future.

## 2019-08-07 NOTE — PROGRESS NOTES
Chief Complaint   Patient presents with   • Back Pain       HISTORY OF PRESENT ILLNESS: Patient is a 64 y.o. female established patient who presents today to discuss the following issues:    Low back pain  Would like to restart physical therapy.  Discussed options.  Will start with a referral to PT at [facility]. Would also like a refill of Norco.   reviewed.  She understands that she may need a referral to pain management in the near future.      Patient Active Problem List    Diagnosis Date Noted   • Avitaminosis D 04/29/2019   • Cold sore 04/29/2019   • Vertigo 01/17/2018   • Hormone replacement therapy (HRT) 01/25/2017   • Insomnia 10/26/2016   • Low back pain 10/26/2016   • Anxiety 10/26/2016       Allergies:Pcn [penicillins]; Sulfa drugs; and Clindamycin    Current Outpatient Medications   Medication Sig Dispense Refill   • ondansetron (ZOFRAN ODT) 4 MG TABLET DISPERSIBLE Take 4 mg by mouth.     • triamcinolone acetonide (KENALOG) 0.1 % Cream Apply  to affected area(s).     • HYDROcodone-Acetaminophen 5-300 MG Tab Take 1-2 Tabs by mouth.     • Cholecalciferol (VITAMIN D PO) Take  by mouth.     • HYDROcodone-Acetaminophen (VICODIN) 5-300 MG Tab Take 1 Tab by mouth every four hours as needed for up to 7 days. 42 Tab 0   • valacyclovir (VALTREX) 1 GM Tab Take 1 Tab by mouth 2 times a day as needed. 20 Tab 2   • Olopatadine HCl 0.2 % Solution 1 Drop by Ophthalmic route every day. 1 Bottle 1   • ALPRAZolam (XANAX) 0.5 MG Tab TK 1 T PO HS PRF SLEEP  5   • zolpidem (AMBIEN) 10 MG Tab TK 1 T PO HS PRF SLEEP  5   • Multiple Vitamin (MULTI-VITAMINS) Tab Take 1 tablet by mouth.     • naproxen (ANAPROX) 220 MG tablet Take 220 mg by mouth.     • estradiol (VIVELLE DOT) 0.1 MG/24HR PATCH BIWEEKLY APPLY 1 PATCH EXTERNALLY TO THE SKIN 2 TIMES A WEEK AS DIRECTED (Patient taking differently: Apply 1 Patch to skin as directed 2X A WEEK. APPLY 1 PATCH EXTERNALLY TO THE SKIN 2 TIMES A WEEK AS DIRECTED) 24 Patch 3   •  acetaminophen (TYLENOL) 500 MG Tab Take 500-1,000 mg by mouth every 6 hours as needed for Moderate Pain.       No current facility-administered medications for this visit.        Social History     Tobacco Use   • Smoking status: Former Smoker     Packs/day: 1.00     Years: 15.00     Pack years: 15.00     Types: Cigarettes     Last attempt to quit: 10/26/1996     Years since quittin.8   • Smokeless tobacco: Never Used   Substance Use Topics   • Alcohol use: Yes     Alcohol/week: 0.0 oz     Comment: socially   • Drug use: No       Family Status   Relation Name Status   • Sis  (Not Specified)   • Fa  (Not Specified)   • Bro  (Not Specified)   • Mo  (Not Specified)     Family History   Problem Relation Age of Onset   • Cancer Sister 58        colorectal   • Heart Attack Father    • Heart Disease Brother 61   • Cancer Mother         brain       Review of Systems:   Constitutional: Negative for fever, chills, weight loss and malaise/fatigue.   HENT: Negative for ear pain, nosebleeds, congestion, sore throat and neck pain.    Eyes: Negative for blurred vision.   Respiratory: Negative for cough, sputum production, shortness of breath and wheezing.    Cardiovascular: Negative for chest pain, palpitations, orthopnea and leg swelling.   Gastrointestinal: Negative for heartburn, nausea, vomiting and abdominal pain.   Genitourinary: Negative for dysuria, urgency and frequency.   Musculoskeletal: Negative for myalgias, joint pain.  Positive for chronic low back pain.  Skin: Negative for rash and itching.   Neurological: Negative for dizziness, tingling, tremors, sensory change, focal weakness and headaches.   Endo/Heme/Allergies: Does not bruise/bleed easily.   Psychiatric/Behavioral: Negative for depression, suicidal ideas and memory loss.  The patient is not nervous/anxious and does not have insomnia.    All other systems reviewed and are negative except as in HPI.    Exam:  /80   Pulse 84   Temp 36.9 °C (98.5 °F)  "  Resp 16   Ht 1.651 m (5' 5\")   Wt 78.9 kg (174 lb)   SpO2 95%   General:  Well nourished, well developed female in NAD  Head: Grossly normal.  Neck: Supple without JVD or bruit. Thyroid is not enlarged.  Pulmonary: Clear to ausculation. Normal effort. No rales, ronchi, or wheezing.  Cardiovascular: Regular rate and rhythm without murmur.   Abdomen:  Soft, nontender, nondistended.  Extremities: No clubbing, cyanosis, or edema.  Skin: Intact with no obvious rashes or lesions.  Neuro: Grossly intact.  Psych: Alert and oriented x 3.  Mood and affect appropriate.    Medical decision-making and discussion: Lurdes is here today for follow-up.  A referral was sent to PT and she was given a prescription for Norco.  She will follow-up here as needed.          Assessment/Plan:  1. Low back pain, unspecified back pain laterality, unspecified chronicity, with sciatica presence unspecified  REFERRAL TO PHYSICAL THERAPY Reason for Therapy: Eval/Treat/Report    HYDROcodone-Acetaminophen (VICODIN) 5-300 MG Tab       Return if symptoms worsen or fail to improve.    Please note that this dictation was created using voice recognition software. I have made every reasonable attempt to correct obvious errors, but I expect that there are errors of grammar and possibly content that I did not discover before finalizing the note.  "

## 2019-08-13 DIAGNOSIS — Z79.890 HORMONE REPLACEMENT THERAPY (HRT): ICD-10-CM

## 2019-08-15 RX ORDER — ESTRADIOL 0.1 MG/D
FILM, EXTENDED RELEASE TRANSDERMAL
Qty: 24 PATCH | Refills: 1 | Status: SHIPPED | OUTPATIENT
Start: 2019-08-15 | End: 2020-02-10 | Stop reason: SDUPTHER

## 2019-09-24 DIAGNOSIS — M54.5 LOW BACK PAIN, UNSPECIFIED BACK PAIN LATERALITY, UNSPECIFIED CHRONICITY, WITH SCIATICA PRESENCE UNSPECIFIED: ICD-10-CM

## 2019-09-24 RX ORDER — HYDROCODONE BITARTRATE AND ACETAMINOPHEN 5; 300 MG/1; MG/1
1 TABLET ORAL EVERY 4 HOURS PRN
Qty: 42 TAB | Refills: 0 | Status: SHIPPED | OUTPATIENT
Start: 2019-09-24 | End: 2019-10-01

## 2019-09-28 DIAGNOSIS — B00.1 COLD SORE: ICD-10-CM

## 2019-09-30 RX ORDER — VALACYCLOVIR HYDROCHLORIDE 1 G/1
TABLET, FILM COATED ORAL
Qty: 20 TAB | Refills: 2 | Status: SHIPPED | OUTPATIENT
Start: 2019-09-30 | End: 2019-12-02 | Stop reason: SDUPTHER

## 2019-10-28 ENCOUNTER — TELEPHONE (OUTPATIENT)
Dept: MEDICAL GROUP | Facility: PHYSICIAN GROUP | Age: 64
End: 2019-10-28

## 2019-10-28 DIAGNOSIS — M54.50 LOW BACK PAIN, UNSPECIFIED BACK PAIN LATERALITY, UNSPECIFIED CHRONICITY, UNSPECIFIED WHETHER SCIATICA PRESENT: ICD-10-CM

## 2019-10-28 NOTE — TELEPHONE ENCOUNTER
I would like to please request a referral to a Pain Management doctor/center at [facility] in [place] on [address].  This is not my choice, as you know, but was your request and I need some help with pain during this PT phase. I have managed to go 15 years without having to do this, and hope to stay in it only for a short time.       I just had my 3rd PT at [facility] this morning with Milly Cardona, PT.  We are doing PT sessions every two weeks and it is going very well.  Milly said that they have Pain Management doctors there and that I probably need a referral from you, but that she would like to help select the Pain Management doctor, and try to get someone who wouldn't push other things on me.     Thank you!

## 2019-10-28 NOTE — TELEPHONE ENCOUNTER
1. Caller Name: Lurdes Ramon                                           Call Back Number: [phone] (home)         Patient approves a detailed voicemail message: N\A    2. SPECIFIC Action To Be Taken: Referral pending, please sign.    3. Diagnosis/Clinical Reason for Request: low back pain    4. Specialty & Provider Name/Lab/Imaging Location: [facility]    5. Is appointment scheduled for requested order/referral: no    Patient was informed they will receive a return phone call from the office ONLY if there are any questions before processing their request. Advised to call back if they haven't received a call from the referral department in 5 days.

## 2019-11-03 DIAGNOSIS — G47.00 INSOMNIA, UNSPECIFIED TYPE: ICD-10-CM

## 2019-11-04 RX ORDER — ZOLPIDEM TARTRATE 10 MG/1
TABLET ORAL
Qty: 30 TAB | Refills: 3 | Status: SHIPPED
Start: 2019-11-04 | End: 2019-12-02 | Stop reason: SDUPTHER

## 2019-11-27 ENCOUNTER — OFFICE VISIT (OUTPATIENT)
Dept: URGENT CARE | Facility: PHYSICIAN GROUP | Age: 64
End: 2019-11-27
Payer: COMMERCIAL

## 2019-11-27 VITALS
SYSTOLIC BLOOD PRESSURE: 120 MMHG | DIASTOLIC BLOOD PRESSURE: 86 MMHG | TEMPERATURE: 97.1 F | OXYGEN SATURATION: 94 % | HEART RATE: 84 BPM | HEIGHT: 66 IN | WEIGHT: 180 LBS | BODY MASS INDEX: 28.93 KG/M2 | RESPIRATION RATE: 16 BRPM

## 2019-11-27 DIAGNOSIS — H10.11 ALLERGIC CONJUNCTIVITIS, RIGHT: ICD-10-CM

## 2019-11-27 PROCEDURE — 99214 OFFICE O/P EST MOD 30 MIN: CPT | Performed by: PHYSICIAN ASSISTANT

## 2019-11-27 RX ORDER — ACETAMINOPHEN, DEXTROMETHORPHAN HBR, CHLORPHENIRAMINE MALEATE 500; 15; 2 MG/1; MG/1; MG/1
TABLET, FILM COATED ORAL
COMMUNITY
Start: 2019-10-01 | End: 2021-06-25

## 2019-11-27 RX ORDER — TOBRAMYCIN 3 MG/ML
1 SOLUTION/ DROPS OPHTHALMIC EVERY 4 HOURS
Qty: 2 ML | Refills: 0 | Status: SHIPPED | OUTPATIENT
Start: 2019-11-27 | End: 2019-12-02

## 2019-11-27 SDOH — HEALTH STABILITY: MENTAL HEALTH: HOW MANY STANDARD DRINKS CONTAINING ALCOHOL DO YOU HAVE ON A TYPICAL DAY?: 1 OR 2

## 2019-11-27 SDOH — HEALTH STABILITY: MENTAL HEALTH: HOW OFTEN DO YOU HAVE A DRINK CONTAINING ALCOHOL?: MONTHLY OR LESS

## 2019-11-27 SDOH — HEALTH STABILITY: MENTAL HEALTH: HOW OFTEN DO YOU HAVE 6 OR MORE DRINKS ON ONE OCCASION?: NEVER

## 2019-11-27 ASSESSMENT — ENCOUNTER SYMPTOMS
DOUBLE VISION: 0
FEVER: 0
EYE REDNESS: 1
SORE THROAT: 0
EYE DISCHARGE: 1
BLURRED VISION: 0
EYE PAIN: 0
CHILLS: 0
PHOTOPHOBIA: 0
COUGH: 0

## 2019-11-27 ASSESSMENT — VISUAL ACUITY: OU: 1

## 2019-11-27 NOTE — PROGRESS NOTES
"Subjective:   Lurdes Ramon  is a 64 y.o. female who presents for Conjunctivitis (Poss. Pink Eye, crusted eye x 2 days)  This is a new problem.  Patient presents to urgent care with concern for bacterial conjunctivitis of the right eye.  The patient reports a 2-day history of right eye redness with yellow crusting mostly in the morning.  There has been slight itching.  Patient denies any recent cold symptoms.  Denies fever or chills.  Denies photosensitivity outside of the norm for her.    Patient has been using over-the-counter eyedrops with minimal improvement in symptoms.  She does have some allergy eyedrops which she has been using and has not been helpful.    The patient states that she has been taking care of some family members cats and 1 of the long haircuts has been having diarrhea.  She is concerned that cleaning up the diarrhea may have caused bacteria to get into her eye causing a secondary infection.      Conjunctivitis   Pertinent negatives include no chills, congestion, coughing, fever, rash or sore throat.     Review of Systems   Constitutional: Negative for chills, fever and malaise/fatigue.   HENT: Negative for congestion and sore throat.    Eyes: Positive for discharge and redness. Negative for blurred vision, double vision, photophobia and pain.   Respiratory: Negative for cough.    Skin: Negative for itching and rash.   All other systems reviewed and are negative.    Allergies   Allergen Reactions   • Pcn [Penicillins] Hives   • Sulfa Drugs Hives   • Clindamycin Shortness of Breath and Swelling     Reviewed past medical, surgical and family history.  Reviewed prescription and over-the-counter medications with patient and electronic health record today.     Objective:   /86 (BP Location: Left arm, Patient Position: Sitting, BP Cuff Size: Adult)   Pulse 84   Temp 36.2 °C (97.1 °F) (Temporal)   Resp 16   Ht 1.676 m (5' 6\")   Wt 81.6 kg (180 lb)   SpO2 94%   BMI 29.05 kg/m² "   Physical Exam  Vitals signs reviewed.   Constitutional:       Appearance: She is well-developed.   HENT:      Head: Normocephalic and atraumatic.      Right Ear: Tympanic membrane, ear canal and external ear normal.      Left Ear: Tympanic membrane, ear canal and external ear normal.      Nose: Nose normal.      Mouth/Throat:      Mouth: Mucous membranes are moist.   Eyes:      General: Lids are normal. Vision grossly intact.         Right eye: No discharge or hordeolum.         Left eye: No discharge or hordeolum.      Extraocular Movements: Extraocular movements intact.      Conjunctiva/sclera:      Right eye: Right conjunctiva is injected.      Left eye: Left conjunctiva is not injected.      Pupils: Pupils are equal, round, and reactive to light.      Comments: Right eye with minimal injection, no discharge   Neck:      Musculoskeletal: Normal range of motion and neck supple.   Cardiovascular:      Rate and Rhythm: Normal rate and regular rhythm.      Heart sounds: Normal heart sounds.   Pulmonary:      Effort: Pulmonary effort is normal.      Breath sounds: Normal breath sounds.   Musculoskeletal: Normal range of motion.   Lymphadenopathy:      Head:      Right side of head: No submental, submandibular, tonsillar, preauricular or posterior auricular adenopathy.      Left side of head: No submental, submandibular, tonsillar, preauricular or posterior auricular adenopathy.      Cervical: No cervical adenopathy.   Skin:     General: Skin is warm and dry.      Findings: No rash.   Neurological:      Mental Status: She is alert and oriented to person, place, and time.      Cranial Nerves: Cranial nerves are intact.      Sensory: Sensation is intact.      Motor: Motor function is intact.      Coordination: Coordination is intact.   Psychiatric:         Attention and Perception: Attention normal.         Mood and Affect: Mood normal.         Speech: Speech normal.         Behavior: Behavior normal.         Thought  Content: Thought content normal.         Judgment: Judgment normal.           Assessment/Plan:   1. Allergic conjunctivitis, right  - tobramycin (TOBREX) 0.3 % Solution ophthalmic solution; Place 1 Drop in right eye every 4 hours for 5 days.  Dispense: 2 mL; Refill: 0      Reviewed with patient the likelihood that this is allergic conjunctivitis.  Her exam today is essentially normal and I do not see any evidence of a bacterial conjunctivitis.  However, the patient is extremely concerned about the possibility of bacterial involvement given the fact that she has been cleaning up cat diarrhea.  Recommend allergy eyedrops which she was given at her previous visit or over-the-counter Naphcon-A.  Continue refresh drops.  Patient is given a contingency prescription for tobramycin to fill if purulent discharge.  Differential diagnosis, natural history, supportive care, and indications for immediate follow-up discussed.     Red flag warning symptoms and strict ER/follow-up precautions given.  The patient demonstrated a good understanding and agreed with the treatment plan.  Please note that this note was created using voice recognition speech to text software. Every effort has been made to correct obvious errors.  However, I expect there are errors of grammar and possibly context that were not discovered prior to finalizing the note  REGINO Reyes PA-C

## 2019-11-27 NOTE — PATIENT INSTRUCTIONS
Allergic Conjunctivitis  A clear membrane (conjunctiva) covers the white part of your eye and the inner surface of your eyelid. Allergic conjunctivitis happens when this membrane has inflammation. This is caused by allergies. Common causes of allergic reactions (allergens)include:  · Outdoor allergens, such as:  ¨ Pollen.  ¨ Grass and weeds.  ¨ Mold spores.  · Indoor allergens, such as:  ¨ Dust.  ¨ Smoke.  ¨ Mold.  ¨ Pet dander.  ¨ Animal hair.  This condition can make your eye red or pink. It can also make your eye feel itchy. This condition cannot be spread from one person to another person (is not contagious).  Follow these instructions at home:  · Try not to be around things that you are allergic to.  · Take or apply over-the-counter and prescription medicines only as told by your doctor. These include any eye drops.  · Place a cool, clean washcloth on your eye for 10-20 minutes. Do this 3-4 times a day.  · Do not touch or rub your eyes.  · Do not wear contact lenses until the inflammation is gone. Wear glasses instead.  · Do not wear eye makeup until the inflammation is gone.  · Keep all follow-up visits as told by your doctor. This is important.  Contact a doctor if:  · Your symptoms get worse.  · Your symptoms do not get better with treatment.  · You have mild eye pain.  · You are sensitive to light,  · You have spots or blisters on your eyes.  · You have pus coming from your eye.  · You have a fever.  Get help right away if:  · You have redness, swelling, or other symptoms in only one eye.  · Your vision is blurry.  · You have vision changes.  · You have very bad eye pain.  Summary  · Allergic conjunctivitis is caused by allergies. It can make your eye red or pink, and it can make your eye feel itchy.  · This condition cannot be spread from one person to another person (is not contagious).  · Try not to be around things that you are allergic to.  · Take or apply over-the-counter and prescription medicines  only as told by your doctor. These include any eye drops.  · Contact your doctor if your symptoms get worse or they do not get better with treatment.  This information is not intended to replace advice given to you by your health care provider. Make sure you discuss any questions you have with your health care provider.  Document Released: 06/07/2011 Document Revised: 08/11/2017 Document Reviewed: 08/11/2017  [employer] Interactive Patient Education © 2017 [employer]      NAPHCON A   REFRESH EYE DROPS, PRESERVATIVE FREE

## 2019-12-02 DIAGNOSIS — B00.1 COLD SORE: ICD-10-CM

## 2019-12-02 DIAGNOSIS — G47.00 INSOMNIA, UNSPECIFIED TYPE: ICD-10-CM

## 2019-12-02 NOTE — TELEPHONE ENCOUNTER
Was the patient seen in the last year in this department? Yes    Does patient have an active prescription for medications requested? No     Received Request Via: Pharmacy      Pt met protocol?: Yes, OV last week, prescribed for cold sores

## 2019-12-03 RX ORDER — ZOLPIDEM TARTRATE 10 MG/1
TABLET ORAL
Qty: 30 TAB | Refills: 3 | Status: SHIPPED
Start: 2019-12-03 | End: 2020-04-03 | Stop reason: SDUPTHER

## 2019-12-03 RX ORDER — VALACYCLOVIR HYDROCHLORIDE 1 G/1
1000 TABLET, FILM COATED ORAL 2 TIMES DAILY PRN
Qty: 20 TAB | Refills: 2 | Status: SHIPPED | OUTPATIENT
Start: 2019-12-03 | End: 2020-01-24 | Stop reason: SDUPTHER

## 2019-12-26 DIAGNOSIS — F41.9 ANXIETY: ICD-10-CM

## 2019-12-30 RX ORDER — ALPRAZOLAM 0.5 MG/1
0.5 TABLET ORAL NIGHTLY PRN
Qty: 30 TAB | Refills: 3 | Status: SHIPPED
Start: 2019-12-30 | End: 2020-01-29

## 2020-01-24 DIAGNOSIS — B00.1 COLD SORE: ICD-10-CM

## 2020-01-24 RX ORDER — VALACYCLOVIR HYDROCHLORIDE 1 G/1
1000 TABLET, FILM COATED ORAL 2 TIMES DAILY PRN
Qty: 20 TAB | Refills: 2 | Status: SHIPPED | OUTPATIENT
Start: 2020-01-24 | End: 2020-04-15 | Stop reason: SDUPTHER

## 2020-01-24 NOTE — TELEPHONE ENCOUNTER
Was the patient seen in the last year in this department? Yes    Does patient have an active prescription for medications requested? No     Received Request Via: Pharmacy      Pt met protocol?: Yes    LAST OV 08/07/2019

## 2020-02-10 DIAGNOSIS — Z79.890 HORMONE REPLACEMENT THERAPY (HRT): ICD-10-CM

## 2020-02-10 NOTE — TELEPHONE ENCOUNTER
Was the patient seen in the last year in this department? Yes    Does patient have an active prescription for medications requested? No     Received Request Via: Pharmacy      Pt met protocol?: Yes, OV 8/19

## 2020-02-11 RX ORDER — ESTRADIOL 0.1 MG/D
FILM, EXTENDED RELEASE TRANSDERMAL
Qty: 24 PATCH | Refills: 1 | Status: SHIPPED | OUTPATIENT
Start: 2020-02-11 | End: 2020-06-18 | Stop reason: SDUPTHER

## 2020-03-25 ENCOUNTER — OFFICE VISIT (OUTPATIENT)
Dept: URGENT CARE | Facility: CLINIC | Age: 65
End: 2020-03-25
Payer: COMMERCIAL

## 2020-03-25 VITALS
HEIGHT: 66 IN | TEMPERATURE: 96.7 F | DIASTOLIC BLOOD PRESSURE: 70 MMHG | BODY MASS INDEX: 28.12 KG/M2 | SYSTOLIC BLOOD PRESSURE: 126 MMHG | OXYGEN SATURATION: 97 % | HEART RATE: 78 BPM | RESPIRATION RATE: 16 BRPM | WEIGHT: 175 LBS

## 2020-03-25 DIAGNOSIS — J98.8 VIRAL RESPIRATORY INFECTION: ICD-10-CM

## 2020-03-25 DIAGNOSIS — B97.89 VIRAL RESPIRATORY INFECTION: ICD-10-CM

## 2020-03-25 LAB
FLUAV+FLUBV AG SPEC QL IA: NEGATIVE
INT CON NEG: NORMAL
INT CON POS: NORMAL

## 2020-03-25 PROCEDURE — 99214 OFFICE O/P EST MOD 30 MIN: CPT | Performed by: NURSE PRACTITIONER

## 2020-03-25 PROCEDURE — 87804 INFLUENZA ASSAY W/OPTIC: CPT | Performed by: NURSE PRACTITIONER

## 2020-03-25 ASSESSMENT — ENCOUNTER SYMPTOMS
HEADACHES: 0
WHEEZING: 0
STRIDOR: 0
SINUS PAIN: 0
DIARRHEA: 0
SORE THROAT: 1
BACK PAIN: 1
HEMOPTYSIS: 0
FEVER: 1
SHORTNESS OF BREATH: 0
COUGH: 0
VOMITING: 0

## 2020-03-25 ASSESSMENT — FIBROSIS 4 INDEX: FIB4 SCORE: 1.08

## 2020-03-26 NOTE — PATIENT INSTRUCTIONS
Symptomatic care.  -Oral hydration and rest.   -Over the counter supressant as directed.  -Diphenhydramine as directed for rhinorrhea (runny nose) and sneezing.  -May take over the counter pseudoephedrine for nasal congestion. Can increase your blood pressure.  -Tylenol or ibuprofen for pain and fever as directed.   -Saline nasal spray as a decongestant.  -Infection control measures at home. Hand washing, covering sneeze/cough.     Follow up with primary care provider. Follow up urgently for worsening symptoms, ear pain or drainage, shortness of breath, abdominal pain, or any other concerns. Follow up emergently for trouble breathing, elevated heart rate, chest pain, signs of dehydration, dizziness, weakness, decreased urine output, confusion, persistent vomiting, severe headache, neck stiffness, persistent high grade fever.    Viral Respiratory Infection  A respiratory infection is an illness that affects part of the respiratory system, such as the lungs, nose, or throat. Most respiratory infections are caused by either viruses or bacteria. A respiratory infection that is caused by a virus is called a viral respiratory infection.  Common types of viral respiratory infections include:  · A cold.  · The flu (influenza).  · A respiratory syncytial virus (RSV) infection.  How do I know if I have a viral respiratory infection?  Most viral respiratory infections cause:  · A stuffy or runny nose.  · Yellow or green nasal discharge.  · A cough.  · Sneezing.  · Fatigue.  · Achy muscles.  · A sore throat.  · Sweating or chills.  · A fever.  · A headache.  How are viral respiratory infections treated?  If influenza is diagnosed early, it may be treated with an antiviral medicine that shortens the length of time a person has symptoms. Symptoms of viral respiratory infections may be treated with over-the-counter and prescription medicines, such as:  · Expectorants. These make it easier to cough up mucus.  · Decongestant nasal  sprays.  Health care providers do not prescribe antibiotic medicines for viral infections. This is because antibiotics are designed to kill bacteria. They have no effect on viruses.  How do I know if I should stay home from work or school?  To avoid exposing others to your respiratory infection, stay home if you have:  · A fever.  · A persistent cough.  · A sore throat.  · A runny nose.  · Sneezing.  · Muscles aches.  · Headaches.  · Fatigue.  · Weakness.  · Chills.  · Sweating.  · Nausea.  Follow these instructions at home:  · Rest as much as possible.  · Take over-the-counter and prescription medicines only as told by your health care provider.  · Drink enough fluid to keep your urine clear or pale yellow. This helps prevent dehydration and helps loosen up mucus.  · Gargle with a salt-water mixture 3-4 times per day or as needed. To make a salt-water mixture, completely dissolve ½-1 tsp of salt in 1 cup of warm water.  · Use nose drops made from salt water to ease congestion and soften raw skin around your nose.  · Do not drink alcohol.  · Do not use tobacco products, including cigarettes, chewing tobacco, and e-cigarettes. If you need help quitting, ask your health care provider.  Contact a health care provider if:  · Your symptoms last for 10 days or longer.  · Your symptoms get worse over time.  · You have a fever.  · You have severe sinus pain in your face or forehead.  · The glands in your jaw or neck become very swollen.  Get help right away if:  · You feel pain or pressure in your chest.  · You have shortness of breath.  · You faint or feel like you will faint.  · You have severe and persistent vomiting.  · You feel confused or disoriented.  This information is not intended to replace advice given to you by your health care provider. Make sure you discuss any questions you have with your health care provider.  Document Released: 09/27/2006 Document Revised: 05/25/2017 Document Reviewed: 05/25/2016  [employer]  Interactive Patient Education © 2017 [employer]    Self-Isolating at Home  If it is determined that you do not need to be hospitalized and can be isolated at home please follow the follow the prevention steps below as based on CDC guidelines.  Stay home except to get medical care  People who are mildly ill with unconfirmed COVID-19 or have any other infectious respiratory illness are able to isolate at home during their illness. You should restrict activities outside your home, except for getting medical care. Do not go to work, school, or public areas. Avoid using public transportation, ride-sharing, or taxis.  Call ahead before visiting your doctor  If you have a medical appointment, call the healthcare provider and tell them that you have or may have unconfirmed COVID-19 or another possibly contagious respiratory illness. This will help the healthcare provider’s office take steps to keep other people from getting infected or exposed.  Separate yourself from other people and animals in your home  As much as possible, you should stay in a specific room and away from other people in your home. Also, you should use a separate bathroom, if available.  You should restrict contact with pets and other animals while you are sick, just like you would around other people. When possible, have another member of your household care for your animals while you are sick. If you must care for your pet or be around animals while you are sick, wash your hands before and after you interact with pets.  Wear a facemask  You should wear a facemask when you are around other people (e.g., sharing a room or vehicle) or pets and before you enter a healthcare provider’s office. If you are not able to wear a facemask (for example, because it causes trouble breathing), then people who live with you should not stay in the same room with you, or they should wear a facemask if they enter your room.  Cover your coughs and sneezes  Cover your  mouth and nose with a tissue when you cough or sneeze. Throw used tissues in a lined trash can. Immediately wash your hands with soap and water for at least 20 seconds or, if soap and water are not available, clean your hands with an alcohol-based hand  that contains at least 60% alcohol.  Clean your hands often  Wash your hands often with soap and water for at least 20 seconds, especially after blowing your nose, coughing, or sneezing; going to the bathroom; and before eating or preparing food. If soap and water are not readily available, use an alcohol-based hand  with at least 60% alcohol, covering all surfaces of your hands and rubbing them together until they feel dry.  Soap and water are the best option if hands are visibly dirty. Avoid touching your eyes, nose, and mouth with unwashed hands.  Avoid sharing personal household items  You should not share dishes, drinking glasses, cups, eating utensils, towels, or bedding with other people or pets in your home. After using these items, they should be washed thoroughly with soap and water.  Clean all “high-touch” surfaces everyday  High touch surfaces include counters, tabletops, doorknobs, bathroom fixtures, toilets, phones, keyboards, tablets, and bedside tables. Also, clean any surfaces that may have blood, stool, or body fluids on them. Use a household cleaning spray or wipe, according to the label instructions. Labels contain instructions for safe and effective use of the cleaning product including precautions you should take when applying the product, such as wearing gloves and making sure you have good ventilation during use of the product.  Monitor your symptoms  Seek prompt medical attention if your illness is worsening (e.g., difficulty breathing). Before seeking care, call your healthcare provider and tell them that you have, or are being evaluated for, unconfirmed COVID-19 or another infectious respiratory illness. Put on a facemask  before you enter the facility. These steps will help the healthcare provider’s office to keep other people in the office or waiting room from getting infected or exposed. Ask your healthcare provider to call the local or state health department. Persons who are placed under active monitoring or facilitated self-monitoring should follow instructions provided by their local health department or occupational health professionals, as appropriate. When working with your local health department check their available hours.  If you have a medical emergency and need to call 911, notify the dispatch personnel that you have, or are being evaluated for unconfirmed COVID-19 or another infectious respiratory illness. If possible, put on a facemask before emergency medical services arrive.  Discontinuing home isolation  Patients with unconfirmed COVID-19 or other infectious respiratory illnesses should remain under home isolation precautions until the risk of secondary transmission to others is thought to be low. In general that means 72 hours after fever resolves without the use of fever reducing medications, AND symptoms have improved AND at least 7 days since symptoms first appeared. If you have questions or concerns consult your healthcare providers or your local health department.  Per CDC guidelines, you are not required to provide a healthcare provider’s note to validate your illness or to return to work, as healthcare provider offices and medical facilities may be extremely busy and not able to provide such documentation in a timely way.

## 2020-03-26 NOTE — PROGRESS NOTES
"  Subjective:     Lurdes Ramon is a 64 y.o. female who presents for Influenza (X5, sore throat, No travel,No direct contact Covid-19) and Flu Like Symptoms      Symptoms for 2 weeks. Feverish, flu like. Got worse the last 4-5 days. Was \"really bad 3 days ago\", then resolved the next day. Intermittent symptoms. Sweaty at night. No thermometer. Denies shortness of breath. Took Tylenol and Advil. Son is sick, from [place], got here 5 days ago. He was seen by MD, unknown flu. No recent travel. Wears a hormone patch, full hysterectomy hx. Night sweats feel different than hot flashes. Chronic back pain, and body pain.    Fever    This is a new problem. The current episode started in the past 7 days. The problem occurs intermittently. The problem has been waxing and waning. Associated symptoms include congestion, muscle aches and a sore throat. Pertinent negatives include no chest pain, coughing, diarrhea, ear pain, headaches, rash, vomiting or wheezing. The treatment provided moderate relief.   Risk factors: no recent travel        Past Medical History:   Diagnosis Date   • Back pain    • Insomnia        Past Surgical History:   Procedure Laterality Date   • HYSTERECTOMY, TOTAL ABDOMINAL  1994   • DENTAL EXTRACTION(S)     • LITHOTRIPSY     • OTHER ORTHOPEDIC SURGERY      back surgery x 2       Social History     Socioeconomic History   • Marital status:      Spouse name: Not on file   • Number of children: Not on file   • Years of education: Not on file   • Highest education level: Not on file   Occupational History   • Not on file   Social Needs   • Financial resource strain: Not on file   • Food insecurity     Worry: Not on file     Inability: Not on file   • Transportation needs     Medical: Not on file     Non-medical: Not on file   Tobacco Use   • Smoking status: Former Smoker     Packs/day: 1.00     Years: 15.00     Pack years: 15.00     Types: Cigarettes     Last attempt to quit: 10/26/1996    "  Years since quittin.4   • Smokeless tobacco: Never Used   Substance and Sexual Activity   • Alcohol use: Yes     Alcohol/week: 0.0 oz     Frequency: Monthly or less     Drinks per session: 1 or 2     Binge frequency: Never     Comment: socially   • Drug use: No   • Sexual activity: Never   Lifestyle   • Physical activity     Days per week: Not on file     Minutes per session: Not on file   • Stress: Not on file   Relationships   • Social connections     Talks on phone: Not on file     Gets together: Not on file     Attends Nondenominational service: Not on file     Active member of club or organization: Not on file     Attends meetings of clubs or organizations: Not on file     Relationship status: Not on file   • Intimate partner violence     Fear of current or ex partner: Not on file     Emotionally abused: Not on file     Physically abused: Not on file     Forced sexual activity: Not on file   Other Topics Concern   • Not on file   Social History Narrative   • Not on file        Family History   Problem Relation Age of Onset   • Cancer Sister 58        colorectal   • Heart Attack Father    • Heart Disease Brother 61   • Cancer Mother         brain        Allergies   Allergen Reactions   • Pcn [Penicillins] Hives   • Sulfa Drugs Hives   • Clindamycin Shortness of Breath and Swelling       Review of Systems   Constitutional: Positive for fever and malaise/fatigue.   HENT: Positive for congestion and sore throat. Negative for ear discharge, ear pain and sinus pain.    Respiratory: Negative for cough, hemoptysis, shortness of breath, wheezing and stridor.    Cardiovascular: Negative for chest pain.   Gastrointestinal: Negative for diarrhea and vomiting.   Musculoskeletal: Positive for back pain.   Skin: Negative for rash.   Neurological: Negative for headaches.   All other systems reviewed and are negative.       Objective:   /70 (BP Location: Left arm, Patient Position: Sitting, BP Cuff Size: Adult)   Pulse 78   " Temp 35.9 °C (96.7 °F) (Temporal)   Resp 16   Ht 1.676 m (5' 6\")   Wt 79.4 kg (175 lb)   SpO2 97%   BMI 28.25 kg/m²     Physical Exam  Vitals signs reviewed.   Constitutional:       General: She is not in acute distress.     Appearance: She is well-developed.   HENT:      Head: Normocephalic and atraumatic.      Right Ear: Tympanic membrane, ear canal and external ear normal.      Left Ear: Tympanic membrane, ear canal and external ear normal.      Nose: Mucosal edema present.      Right Sinus: No maxillary sinus tenderness or frontal sinus tenderness.      Left Sinus: No maxillary sinus tenderness or frontal sinus tenderness.      Mouth/Throat:      Mouth: Mucous membranes are moist.      Pharynx: Posterior oropharyngeal erythema present. No pharyngeal swelling or uvula swelling.      Comments: Clear post nasal drip.   Eyes:      Conjunctiva/sclera: Conjunctivae normal.   Neck:      Musculoskeletal: Normal range of motion and neck supple.   Cardiovascular:      Rate and Rhythm: Normal rate and regular rhythm.   Pulmonary:      Effort: Pulmonary effort is normal. No accessory muscle usage, prolonged expiration, respiratory distress or retractions.      Breath sounds: Normal breath sounds. No stridor. No decreased breath sounds, wheezing, rhonchi or rales.   Musculoskeletal: Normal range of motion.   Lymphadenopathy:      Head:      Right side of head: No submental, submandibular, tonsillar, preauricular, posterior auricular or occipital adenopathy.      Left side of head: No submental, submandibular, tonsillar, preauricular, posterior auricular or occipital adenopathy.   Skin:     General: Skin is warm and dry.      Findings: No rash.   Neurological:      General: No focal deficit present.      Mental Status: She is alert and oriented to person, place, and time.      GCS: GCS eye subscore is 4. GCS verbal subscore is 5. GCS motor subscore is 6.   Psychiatric:         Mood and Affect: Mood normal.         " Speech: Speech normal.         Behavior: Behavior normal.         Thought Content: Thought content normal.         Judgment: Judgment normal.         Assessment/Plan:   1. Viral respiratory infection  - POCT Influenza A/B    Results for orders placed or performed in visit on 03/25/20   POCT Influenza A/B   Result Value Ref Range    Rapid Influenza A-B Negative     Internal Control Positive Valid     Internal Control Negative Valid      No Travel or Identified Risk: Stay home while symptomatic. Self isolate until 24 hours after symptoms resolve. For specific questions regarding COVID-19 testing: reach out to your local health department or utilize their online resources. For [facility] fill out the online survey or call [phone].    -Discussed viral etiology. Stable vitals, good O2 Sats.     Symptomatic care.  -Oral hydration and rest.   -Over the counter supressant as directed.  -Diphenhydramine as directed for rhinorrhea (runny nose) and sneezing.  -May take over the counter pseudoephedrine for nasal congestion. Can increase your blood pressure.  -Tylenol or ibuprofen for pain and fever as directed.  -Saline nasal spray as a decongestant.  -Infection control measures at home. Hand washing, covering sneeze/cough.    Discussed associated complications, including risk of pneumonia. Follow up with primary care provider. Follow up urgently for worsening symptoms, ear pain or drainage, shortness of breath, abdominal pain, or any other concerns. Follow up emergently for trouble breathing, elevated heart rate, chest pain, signs of dehydration, dizziness, weakness, decreased urine output, confusion, persistent vomiting, severe headache, neck stiffness, persistent high grade fever.    Differential diagnosis, natural history, supportive care, and indications for immediate follow-up discussed.

## 2020-04-03 DIAGNOSIS — G47.00 INSOMNIA, UNSPECIFIED TYPE: ICD-10-CM

## 2020-04-03 RX ORDER — ZOLPIDEM TARTRATE 10 MG/1
TABLET ORAL
Qty: 30 TAB | Refills: 0 | Status: SHIPPED
Start: 2020-04-03 | End: 2020-05-04 | Stop reason: SDUPTHER

## 2020-04-14 DIAGNOSIS — B00.1 COLD SORE: ICD-10-CM

## 2020-04-15 RX ORDER — VALACYCLOVIR HYDROCHLORIDE 1 G/1
1000 TABLET, FILM COATED ORAL 2 TIMES DAILY PRN
Qty: 20 TAB | Refills: 0 | Status: SHIPPED | OUTPATIENT
Start: 2020-04-15 | End: 2020-04-29

## 2020-05-04 DIAGNOSIS — G47.00 INSOMNIA, UNSPECIFIED TYPE: ICD-10-CM

## 2020-05-04 RX ORDER — ZOLPIDEM TARTRATE 10 MG/1
TABLET ORAL
Qty: 30 TAB | Refills: 5 | Status: SHIPPED | OUTPATIENT
Start: 2020-05-04 | End: 2020-06-02

## 2020-06-18 DIAGNOSIS — Z79.890 HORMONE REPLACEMENT THERAPY (HRT): ICD-10-CM

## 2020-06-19 ENCOUNTER — PATIENT MESSAGE (OUTPATIENT)
Dept: MEDICAL GROUP | Facility: PHYSICIAN GROUP | Age: 65
End: 2020-06-19

## 2020-06-19 NOTE — PATIENT COMMUNICATION
Iva Maya P.A.-C.  You 44 minutes ago (12:20 PM)       I don't see that they've discussed HRT since 2018, I'd prefer confirmation with [facility].     Thank you,     Iva Maya PA-C    Message text       You  Iva Maya P.A.-C. 2 hours ago (10:55 AM)       Will you fill this?    Message text

## 2020-06-24 RX ORDER — ESTRADIOL 0.1 MG/D
FILM, EXTENDED RELEASE TRANSDERMAL
Qty: 24 PATCH | Refills: 3 | Status: SHIPPED | OUTPATIENT
Start: 2020-06-24 | End: 2021-05-05 | Stop reason: SDUPTHER

## 2020-07-10 DIAGNOSIS — G47.00 INSOMNIA, UNSPECIFIED TYPE: ICD-10-CM

## 2020-07-10 NOTE — TELEPHONE ENCOUNTER
[id number] - [place], [place] - [address] AT [facility] [address] [address]  Phone: [phone] Fax: [phone]    Pharmacy states zolpidem 10mg is on back order and asking for 5 mg bid

## 2020-07-13 RX ORDER — ZOLPIDEM TARTRATE 5 MG/1
TABLET ORAL
Qty: 60 TAB | Refills: 0 | OUTPATIENT
Start: 2020-07-13 | End: 2020-08-08

## 2020-07-25 ENCOUNTER — OFFICE VISIT (OUTPATIENT)
Dept: URGENT CARE | Facility: PHYSICIAN GROUP | Age: 65
End: 2020-07-25
Payer: COMMERCIAL

## 2020-07-25 ENCOUNTER — HOSPITAL ENCOUNTER (OUTPATIENT)
Facility: MEDICAL CENTER | Age: 65
End: 2020-07-25
Attending: PHYSICIAN ASSISTANT
Payer: COMMERCIAL

## 2020-07-25 VITALS
RESPIRATION RATE: 16 BRPM | SYSTOLIC BLOOD PRESSURE: 152 MMHG | HEART RATE: 86 BPM | TEMPERATURE: 98.3 F | BODY MASS INDEX: 29.99 KG/M2 | HEIGHT: 66 IN | WEIGHT: 186.6 LBS | OXYGEN SATURATION: 95 % | DIASTOLIC BLOOD PRESSURE: 96 MMHG

## 2020-07-25 DIAGNOSIS — R60.0 BILATERAL LEG EDEMA: ICD-10-CM

## 2020-07-25 PROCEDURE — 99214 OFFICE O/P EST MOD 30 MIN: CPT | Performed by: PHYSICIAN ASSISTANT

## 2020-07-25 PROCEDURE — 83880 ASSAY OF NATRIURETIC PEPTIDE: CPT

## 2020-07-25 PROCEDURE — 85025 COMPLETE CBC W/AUTO DIFF WBC: CPT

## 2020-07-25 PROCEDURE — 80053 COMPREHEN METABOLIC PANEL: CPT

## 2020-07-25 ASSESSMENT — PAIN SCALES - GENERAL: PAINLEVEL: 5=MODERATE PAIN

## 2020-07-25 ASSESSMENT — FIBROSIS 4 INDEX: FIB4 SCORE: 1.09

## 2020-07-26 DIAGNOSIS — R60.0 BILATERAL LEG EDEMA: ICD-10-CM

## 2020-07-26 LAB
ALBUMIN SERPL BCP-MCNC: 4.5 G/DL (ref 3.2–4.9)
ALBUMIN/GLOB SERPL: 1.6 G/DL
ALP SERPL-CCNC: 84 U/L (ref 30–99)
ALT SERPL-CCNC: 13 U/L (ref 2–50)
AMBIGUOUS DTTM AMBI4: NORMAL
ANION GAP SERPL CALC-SCNC: 13 MMOL/L (ref 7–16)
AST SERPL-CCNC: 18 U/L (ref 12–45)
BASOPHILS # BLD AUTO: 0.6 % (ref 0–1.8)
BASOPHILS # BLD: 0.05 K/UL (ref 0–0.12)
BILIRUB SERPL-MCNC: 0.5 MG/DL (ref 0.1–1.5)
BUN SERPL-MCNC: 11 MG/DL (ref 8–22)
CALCIUM SERPL-MCNC: 8.8 MG/DL (ref 8.5–10.5)
CHLORIDE SERPL-SCNC: 103 MMOL/L (ref 96–112)
CO2 SERPL-SCNC: 22 MMOL/L (ref 20–33)
CREAT SERPL-MCNC: 0.55 MG/DL (ref 0.5–1.4)
EOSINOPHIL # BLD AUTO: 0.05 K/UL (ref 0–0.51)
EOSINOPHIL NFR BLD: 0.6 % (ref 0–6.9)
ERYTHROCYTE [DISTWIDTH] IN BLOOD BY AUTOMATED COUNT: 45.8 FL (ref 35.9–50)
GLOBULIN SER CALC-MCNC: 2.8 G/DL (ref 1.9–3.5)
GLUCOSE SERPL-MCNC: 93 MG/DL (ref 65–99)
HCT VFR BLD AUTO: 43.9 % (ref 37–47)
HGB BLD-MCNC: 14.4 G/DL (ref 12–16)
IMM GRANULOCYTES # BLD AUTO: 0.04 K/UL (ref 0–0.11)
IMM GRANULOCYTES NFR BLD AUTO: 0.4 % (ref 0–0.9)
LYMPHOCYTES # BLD AUTO: 1.65 K/UL (ref 1–4.8)
LYMPHOCYTES NFR BLD: 18.3 % (ref 22–41)
MCH RBC QN AUTO: 33.8 PG (ref 27–33)
MCHC RBC AUTO-ENTMCNC: 32.8 G/DL (ref 33.6–35)
MCV RBC AUTO: 103.1 FL (ref 81.4–97.8)
MONOCYTES # BLD AUTO: 0.7 K/UL (ref 0–0.85)
MONOCYTES NFR BLD AUTO: 7.8 % (ref 0–13.4)
NEUTROPHILS # BLD AUTO: 6.52 K/UL (ref 2–7.15)
NEUTROPHILS NFR BLD: 72.3 % (ref 44–72)
NRBC # BLD AUTO: 0 K/UL
NRBC BLD-RTO: 0 /100 WBC
NT-PROBNP SERPL IA-MCNC: 85 PG/ML (ref 0–125)
PLATELET # BLD AUTO: 244 K/UL (ref 164–446)
PMV BLD AUTO: 11.4 FL (ref 9–12.9)
POTASSIUM SERPL-SCNC: 4.4 MMOL/L (ref 3.6–5.5)
PROT SERPL-MCNC: 7.3 G/DL (ref 6–8.2)
RBC # BLD AUTO: 4.26 M/UL (ref 4.2–5.4)
SODIUM SERPL-SCNC: 138 MMOL/L (ref 135–145)
WBC # BLD AUTO: 9 K/UL (ref 4.8–10.8)

## 2020-07-26 ASSESSMENT — ENCOUNTER SYMPTOMS
CHILLS: 0
SENSORY CHANGE: 0
VOMITING: 0
FEVER: 0
PALPITATIONS: 0
HEADACHES: 0
WEAKNESS: 0
ORTHOPNEA: 0
NAUSEA: 0
ABDOMINAL PAIN: 0
FOCAL WEAKNESS: 0
COUGH: 0
SEIZURES: 0
SPEECH CHANGE: 0
LEG SWELLING: 1
SHORTNESS OF BREATH: 0
DIZZINESS: 0
CLAUDICATION: 0
TREMORS: 0
BLURRED VISION: 0
DIARRHEA: 0
DOUBLE VISION: 0
TINGLING: 0
LOSS OF CONSCIOUSNESS: 0

## 2020-07-26 NOTE — PROGRESS NOTES
Subjective:   Lurdes Ramon is a 65 y.o. female who presents for Leg Swelling (Bilateral legs worse on the (R) leg, x2 days )      Leg Swelling   This is a chronic problem. The current episode started more than 1 month ago. The problem occurs constantly. Pertinent negatives include no abdominal pain, chest pain, chills, coughing, fever, headaches, nausea, rash, vomiting or weakness. Nothing aggravates the symptoms. She has tried nothing for the symptoms.       Review of Systems   Constitutional: Negative for chills and fever.   Eyes: Negative for blurred vision and double vision.   Respiratory: Negative for cough and shortness of breath.    Cardiovascular: Negative for chest pain, palpitations, orthopnea, claudication and leg swelling.   Gastrointestinal: Negative for abdominal pain, diarrhea, nausea and vomiting.   Musculoskeletal:        Leg edema   Skin: Negative for rash.   Neurological: Negative for dizziness, tingling, tremors, sensory change, speech change, focal weakness, seizures, loss of consciousness, weakness and headaches.   All other systems reviewed and are negative.      Medications:    • acetaminophen Tabs  • estradiol Pttw  • Flu BP Maximum Strength Tabs  • Flucelvax Cathy  • HYDROcodone-Acetaminophen Tabs  • Multi-Vitamins Tabs  • naproxen  • olopatadine HCl  • ondansetron Tbdp  • triamcinolone acetonide Crea  • valacyclovir Tabs  • VITAMIN D PO    Allergies: Pcn [penicillins]; Sulfa drugs; and Clindamycin    Problem List: Lurdes Ramon has Insomnia; Low back pain; Anxiety; Hormone replacement therapy (HRT); Vertigo; Avitaminosis D; and Cold sore on their problem list.    Surgical History:  Past Surgical History:   Procedure Laterality Date   • HYSTERECTOMY, TOTAL ABDOMINAL  1994   • DENTAL EXTRACTION(S)     • LITHOTRIPSY     • OTHER ORTHOPEDIC SURGERY      back surgery x 2       Past Social Hx: Lurdes Ramon  reports that she quit smoking about 23 years ago. Her smoking use  "included cigarettes. She has a 15.00 pack-year smoking history. She has never used smokeless tobacco. She reports current alcohol use. She reports that she does not use drugs.     Past Family Hx:  Lurdes Ramon family history includes Cancer in her mother; Cancer (age of onset: 58) in her sister; Heart Attack in her father; Heart Disease (age of onset: 61) in her brother.     Problem list, medications, and allergies reviewed by myself today in Epic.     Objective:     Blood Pressure 152/96 (BP Location: Left arm, Patient Position: Sitting, BP Cuff Size: Adult)   Pulse 86   Temperature 36.8 °C (98.3 °F) (Temporal)   Respiration 16   Height 1.676 m (5' 6\")   Weight 84.6 kg (186 lb 9.6 oz)   Oxygen Saturation 95%   Body Mass Index 30.12 kg/m²     Physical Exam  Vitals signs reviewed.   Constitutional:       General: She is not in acute distress.     Appearance: She is well-developed. She is not ill-appearing or toxic-appearing.      Interventions: She is not intubated.  HENT:      Head: Normocephalic and atraumatic.      Right Ear: Hearing, tympanic membrane, ear canal and external ear normal.      Left Ear: Hearing, tympanic membrane, ear canal and external ear normal.      Nose: Nose normal.      Mouth/Throat:      Pharynx: Uvula midline.   Eyes:      General: Lids are normal.      Conjunctiva/sclera: Conjunctivae normal.   Neck:      Musculoskeletal: Full passive range of motion without pain, normal range of motion and neck supple.   Cardiovascular:      Rate and Rhythm: Regular rhythm.      Heart sounds: Normal heart sounds, S1 normal and S2 normal. No murmur. No friction rub. No gallop.    Pulmonary:      Effort: Pulmonary effort is normal. No tachypnea, bradypnea, accessory muscle usage or respiratory distress. She is not intubated.      Breath sounds: Normal breath sounds. No decreased breath sounds, wheezing, rhonchi or rales.   Chest:      Chest wall: No tenderness.   Musculoskeletal: Normal " range of motion.      Right lower leg: Edema present.      Left lower leg: Edema present.   Skin:     General: Skin is warm and dry.   Neurological:      Mental Status: She is alert and oriented to person, place, and time.   Psychiatric:         Speech: Speech normal.         Behavior: Behavior normal.         Thought Content: Thought content normal.         Judgment: Judgment normal.         Assessment/Plan:     Medical Decision Making/Comments   Patient is a 65-year-old female who presents for evaluation of bilateral ankle swelling over several months.  She denies any shortness of breath.  There is no pain with the legs.  She has no PMH of chronic disorders/disease.  Vital signs within normal limits.  There is 1+ pitting edema and both ankles.  Negative Homans sign.  Labs show no signs of CHF, kidney or liver disease.  Discussed diagnosis differential.  Likely benign due to overweight, salt intake and sedentary lifestyle.  Recommend follow-up with PCP for further evaluation and treatment.     Diagnosis and associated orders     1. Bilateral leg edema  CBC WITH DIFFERENTIAL    Comp Metabolic Panel    BTYPE NATRIURETIC PEPTIDE     -Elevate legs  -Compression stockings         Differential diagnosis, natural history, supportive care, and indications for immediate follow-up discussed.    Advised the patient to follow-up with the primary care physician for recheck, reevaluation, and consideration of further management.    Please note that this dictation was created using voice recognition software. I have made a reasonable attempt to correct obvious errors, but I expect that there are errors of grammar and possibly content that I did not discover before finalizing the note.

## 2020-08-25 ENCOUNTER — HOSPITAL ENCOUNTER (OUTPATIENT)
Dept: RADIOLOGY | Facility: MEDICAL CENTER | Age: 65
End: 2020-08-25
Attending: NURSE PRACTITIONER
Payer: COMMERCIAL

## 2020-08-25 DIAGNOSIS — Z12.31 VISIT FOR SCREENING MAMMOGRAM: ICD-10-CM

## 2020-08-25 PROCEDURE — 77067 SCR MAMMO BI INCL CAD: CPT

## 2020-10-05 DIAGNOSIS — B00.1 COLD SORE: ICD-10-CM

## 2020-10-05 RX ORDER — VALACYCLOVIR HYDROCHLORIDE 1 G/1
1000 TABLET, FILM COATED ORAL 2 TIMES DAILY PRN
Qty: 20 TAB | Refills: 0 | Status: SHIPPED | OUTPATIENT
Start: 2020-10-05 | End: 2020-10-22 | Stop reason: SDUPTHER

## 2020-11-12 DIAGNOSIS — B00.1 COLD SORE: ICD-10-CM

## 2020-11-12 DIAGNOSIS — F51.01 PRIMARY INSOMNIA: ICD-10-CM

## 2020-11-18 RX ORDER — VALACYCLOVIR HYDROCHLORIDE 1 G/1
1000 TABLET, FILM COATED ORAL 2 TIMES DAILY PRN
Qty: 20 TAB | Refills: 0 | Status: SHIPPED | OUTPATIENT
Start: 2020-11-18 | End: 2020-12-09 | Stop reason: SDUPTHER

## 2020-11-18 RX ORDER — ZOLPIDEM TARTRATE 10 MG/1
10 TABLET ORAL NIGHTLY PRN
Qty: 30 TAB | Refills: 0 | Status: SHIPPED | OUTPATIENT
Start: 2020-11-18 | End: 2020-12-21 | Stop reason: SDUPTHER

## 2020-11-20 ENCOUNTER — TELEPHONE (OUTPATIENT)
Dept: MEDICAL GROUP | Facility: PHYSICIAN GROUP | Age: 65
End: 2020-11-20

## 2020-11-20 NOTE — TELEPHONE ENCOUNTER
DOCUMENTATION OF PAR STATUS:    1. Name of Medication & Dose: valacyclovir 1gm tab     2. Name of Prescription Coverage Company & phone #: [employer]    3. Date Prior Auth Submitted: 11/20/2020    4. What information was given to obtain insurance decision? Cover My Meds    5. Prior Auth Status? Pending    6. Patient Notified: yes

## 2020-12-09 DIAGNOSIS — B00.1 COLD SORE: ICD-10-CM

## 2020-12-09 DIAGNOSIS — M54.40 CHRONIC LOW BACK PAIN WITH SCIATICA, SCIATICA LATERALITY UNSPECIFIED, UNSPECIFIED BACK PAIN LATERALITY: ICD-10-CM

## 2020-12-09 DIAGNOSIS — G89.29 CHRONIC LOW BACK PAIN WITH SCIATICA, SCIATICA LATERALITY UNSPECIFIED, UNSPECIFIED BACK PAIN LATERALITY: ICD-10-CM

## 2020-12-09 DIAGNOSIS — F51.01 PRIMARY INSOMNIA: ICD-10-CM

## 2020-12-09 NOTE — TELEPHONE ENCOUNTER
Pt has appointment 01/06/21 with Iva to establish    Received request via: Patient    Was the patient seen in the last year in this department? Yes    Does the patient have an active prescription (recently filled or refills available) for medication(s) requested? No

## 2020-12-18 ENCOUNTER — OFFICE VISIT (OUTPATIENT)
Dept: URGENT CARE | Facility: PHYSICIAN GROUP | Age: 65
End: 2020-12-18
Payer: COMMERCIAL

## 2020-12-21 RX ORDER — VALACYCLOVIR HYDROCHLORIDE 1 G/1
1000 TABLET, FILM COATED ORAL 2 TIMES DAILY PRN
Qty: 20 TAB | Refills: 0 | Status: SHIPPED | OUTPATIENT
Start: 2020-12-21 | End: 2021-05-05 | Stop reason: SDUPTHER

## 2020-12-21 RX ORDER — ZOLPIDEM TARTRATE 10 MG/1
10 TABLET ORAL NIGHTLY PRN
Qty: 30 TAB | Refills: 0 | Status: SHIPPED | OUTPATIENT
Start: 2020-12-21 | End: 2021-01-20

## 2020-12-21 RX ORDER — HYDROCODONE BITARTRATE AND ACETAMINOPHEN 5; 325 MG/1; MG/1
1 TABLET ORAL
Qty: 30 TAB | Refills: 0 | Status: SHIPPED | OUTPATIENT
Start: 2020-12-21 | End: 2021-01-20

## 2021-03-03 DIAGNOSIS — Z23 NEED FOR VACCINATION: ICD-10-CM

## 2021-05-03 NOTE — TELEPHONE ENCOUNTER
Ritchie- Controlled med in queue. Please refill as you see fit.    
Photo Preface (Leave Blank If You Do Not Want): Photographs were obtained today
Detail Level: Zone

## 2021-05-05 ENCOUNTER — TELEMEDICINE (OUTPATIENT)
Dept: MEDICAL GROUP | Facility: PHYSICIAN GROUP | Age: 66
End: 2021-05-05
Payer: COMMERCIAL

## 2021-05-05 VITALS — HEIGHT: 66 IN | BODY MASS INDEX: 28.93 KG/M2 | WEIGHT: 180 LBS

## 2021-05-05 DIAGNOSIS — G89.28 OTHER CHRONIC POSTPROCEDURAL PAIN: ICD-10-CM

## 2021-05-05 DIAGNOSIS — G89.29 CHRONIC BILATERAL LOW BACK PAIN WITH RIGHT-SIDED SCIATICA: ICD-10-CM

## 2021-05-05 DIAGNOSIS — Z79.890 HORMONE REPLACEMENT THERAPY (HRT): ICD-10-CM

## 2021-05-05 DIAGNOSIS — B00.1 COLD SORE: ICD-10-CM

## 2021-05-05 DIAGNOSIS — Z76.89 ENCOUNTER TO ESTABLISH CARE WITH NEW DOCTOR: ICD-10-CM

## 2021-05-05 DIAGNOSIS — R53.82 CHRONIC FATIGUE: ICD-10-CM

## 2021-05-05 DIAGNOSIS — M54.41 CHRONIC BILATERAL LOW BACK PAIN WITH RIGHT-SIDED SCIATICA: ICD-10-CM

## 2021-05-05 DIAGNOSIS — G47.00 INSOMNIA, UNSPECIFIED TYPE: ICD-10-CM

## 2021-05-05 PROCEDURE — 99214 OFFICE O/P EST MOD 30 MIN: CPT | Performed by: FAMILY MEDICINE

## 2021-05-05 RX ORDER — HYDROCODONE BITARTRATE AND ACETAMINOPHEN 5; 325 MG/1; MG/1
1 TABLET ORAL EVERY 4 HOURS PRN
COMMUNITY
End: 2021-05-05 | Stop reason: SDUPTHER

## 2021-05-05 RX ORDER — ALPRAZOLAM 0.5 MG/1
0.5 TABLET ORAL NIGHTLY PRN
COMMUNITY
End: 2021-06-25

## 2021-05-05 RX ORDER — ZOLPIDEM TARTRATE 10 MG/1
10 TABLET ORAL NIGHTLY PRN
COMMUNITY
End: 2021-06-25 | Stop reason: SDUPTHER

## 2021-05-05 RX ORDER — HYDROCODONE BITARTRATE AND ACETAMINOPHEN 5; 325 MG/1; MG/1
TABLET ORAL
Qty: 30 TABLET | Refills: 0 | Status: SHIPPED | OUTPATIENT
Start: 2021-05-05 | End: 2021-06-25 | Stop reason: SDUPTHER

## 2021-05-05 RX ORDER — VALACYCLOVIR HYDROCHLORIDE 1 G/1
TABLET, FILM COATED ORAL
Qty: 20 TABLET | Refills: 5 | Status: SHIPPED | OUTPATIENT
Start: 2021-05-05 | End: 2021-11-19 | Stop reason: SDUPTHER

## 2021-05-05 RX ORDER — ESTRADIOL 0.1 MG/D
FILM, EXTENDED RELEASE TRANSDERMAL
Qty: 24 PATCH | Refills: 3 | Status: SHIPPED | OUTPATIENT
Start: 2021-05-05 | End: 2021-11-08 | Stop reason: SDUPTHER

## 2021-05-05 ASSESSMENT — FIBROSIS 4 INDEX: FIB4 SCORE: 1.35

## 2021-05-05 NOTE — PROGRESS NOTES
Virtual Visit: Established Patient   This visit was conducted via Zoom using secure and encrypted videoconferencing technology. The patient was in a private location in the state of [place].    The patient's identity was confirmed and verbal consent was obtained for this virtual visit.    Subjective:   CC:   Chief Complaint   Patient presents with   • Establish Care   • Medication Refill       Lurdes Ramon is a 66 y.o. female presenting for evaluation and management of:    To establish care and discuss her various medical issues as well as medications that she uses.    ROS   Denies any recent fevers or chills. No nausea or vomiting. No chest pains or shortness of breath.     Allergies   Allergen Reactions   • Pcn [Penicillins] Hives   • Sulfa Drugs Hives   • Clindamycin Shortness of Breath and Swelling       Current medicines (including changes today)  Current Outpatient Medications   Medication Sig Dispense Refill   • zolpidem (AMBIEN) 10 MG Tab Take 10 mg by mouth at bedtime as needed for Sleep.     • ALPRAZolam (XANAX) 0.5 MG Tab Take 0.5 mg by mouth at bedtime as needed for Sleep.     • estradiol (VIVELLE DOT) 0.1 MG/24HR PATCH BIWEEKLY APPLY 1 PATCH EXTERNALLY TO THE SKIN 2 TIMES A WEEK AS DIRECTED 24 Patch 3   • valacyclovir (VALTREX) 1 GM Tab 2 po bid for 1 day prn outbreak 20 tablet 5   • HYDROcodone-acetaminophen (NORCO) 5-325 MG Tab per tablet 1/2-1 tab per day prn moderate/severe pain 30 tablet 0   • Cholecalciferol (VITAMIN D PO) Take  by mouth.     • Multiple Vitamin (MULTI-VITAMINS) Tab Take 1 tablet by mouth.     • FLU BP MAXIMUM STRENGTH -2 MG Tab        No current facility-administered medications for this visit.       Patient Active Problem List    Diagnosis Date Noted   • Other chronic postprocedural pain 05/05/2021   • Chronic fatigue 05/05/2021   • Avitaminosis D 04/29/2019   • Cold sore 04/29/2019   • Vertigo 01/17/2018   • Hormone replacement therapy (HRT) 01/25/2017   • Encounter  "to establish care with new doctor 10/26/2016   • Insomnia 10/26/2016   • Chronic bilateral low back pain with right-sided sciatica 10/26/2016   • Anxiety 10/26/2016       Family History   Problem Relation Age of Onset   • Cancer Sister 58        colorectal   • Heart Attack Father    • Heart Disease Brother 61   • Cancer Mother         brain       She  has a past medical history of Back pain and Insomnia.  She  has a past surgical history that includes hysterectomy, total abdominal (1994); other orthopedic surgery; dental extraction(s); and lithotripsy.       Objective:   Ht 1.676 m (5' 6\") Comment: per pt  Wt 81.6 kg (180 lb) Comment: per pt  BMI 29.05 kg/m²     Physical Exam:  Constitutional: Alert, no distress, well-groomed.  Skin: No rashes in visible areas.  Eye: Round. . No icterus.   ENMT: Lips pink without lesions,  Phonation normal.  Respiratory: Unlabored respiratory effort, no cough or audible wheeze  Psych: Alert and oriented x3, normal affect and mood.       Assessment and Plan:   The following treatment plan was discussed:     1. Hormone replacement therapy (HRT)  - estradiol (VIVELLE DOT) 0.1 MG/24HR PATCH BIWEEKLY; APPLY 1 PATCH EXTERNALLY TO THE SKIN 2 TIMES A WEEK AS DIRECTED  Dispense: 24 Patch; Refill: 3  This is a chronic problem.  We will renew her medication for now.  2. Cold sore  - valacyclovir (VALTREX) 1 GM Tab; 2 po bid for 1 day prn outbreak  Dispense: 20 tablet; Refill: 5  This is a chronic problem.  Medication renewed.  3. Insomnia, unspecified type  This is a chronic problem.  I discussed with patient there are better medicines out there for insomnia and deeply Belsomra would be a good choice.  Also there is trazodone and amitriptyline but she thinks she did not react well to antidepressants in the past.  For now she will continue off of the Ambien and and try to do the best she can.  4. Chronic bilateral low back pain with right-sided sciatica  - HYDROcodone-acetaminophen (NORCO) " 5-325 MG Tab per tablet; 1/2-1 tab per day prn moderate/severe pain  Dispense: 30 tablet; Refill: 0  - Controlled Substance Treatment Agreement  This is a chronic problem.  Patient does have the substance treatment agreement sent to her by [facility] and was told she needs to bring that in for continued care.  She also may need to get at least annual drug screens done as per the policy of [facility] on pain management.  I told her as long she is using such a small amount of medication I can continue to prescribe it but if she finds her need escalates she will need to go to pain management or back to the neurosurgeon.  5. Other chronic postprocedural pain  This is a chronic issue.  See #4 above.  6. Chronic fatigue  - TSH WITH REFLEX TO FT4; Future  - VITAMIN B12; Future  - VITAMIN D,25 HYDROXY; Future  This is a chronic issue.  Lab ordered to rule out other causes of her fatigue but is most likely due to her fragmented sleep pattern.  7. Encounter to establish care with new doctor  - Comp Metabolic Panel; Future  - Lipid Profile; Future  - URINALYSIS,CULTURE IF INDICATED; Future  - TSH WITH REFLEX TO FT4; Future  - CBC WITH DIFFERENTIAL; Future  - ESTIMATED GFR; Future  - VITAMIN B12; Future  - VITAMIN D,25 HYDROXY; Future  This is a chronic problem.  Baseline labs ordered.  Patient is to come in for a physical exam as well in the next 3 to 6 months.          Follow-up: Return in about 3 months (around 8/5/2021) for annual exam.

## 2021-05-05 NOTE — ASSESSMENT & PLAN NOTE
This is a new patient visit using telehealth to establish care.  Other than her chronic pain issues and insomnia she does not have any major problems.

## 2021-05-05 NOTE — ASSESSMENT & PLAN NOTE
This is a chronic problem.  Patient states that her insomnia seems to be due to her chronic pain issues.  She has used Ambien in the past as well as an occasional benzodiazepine but she was told either 1 of those are good to use chronically.

## 2021-05-05 NOTE — ASSESSMENT & PLAN NOTE
This is a chronic problem.  Patient states that the use of chronic pain medications on a very low dose helps her get through the day although does not eliminate her pain.

## 2021-05-05 NOTE — ASSESSMENT & PLAN NOTE
This is a chronic problem.  Patient states she has had low back pain with sciatica since her low back fusion in 2009.  She states she is seen several neurosurgeons who did not necessarily recommend surgery at this time.  But they state nothing else could be done other than surgery.  She has been taking half of a pain pill in the morning and she states that will help her get through the day.  She is not sure if she might have tried Lyrica in the past but not sure.  She has done physical therapy and not interested in going back for any that at the present time also does not want any acupuncture.

## 2021-05-08 DIAGNOSIS — Z79.890 HORMONE REPLACEMENT THERAPY (HRT): ICD-10-CM

## 2021-05-10 RX ORDER — ESTRADIOL 0.1 MG/D
FILM, EXTENDED RELEASE TRANSDERMAL
Qty: 26 PATCH | OUTPATIENT
Start: 2021-05-10

## 2021-05-11 RX ORDER — ESTRADIOL 0.1 MG/D
FILM, EXTENDED RELEASE TRANSDERMAL
Qty: 24 PATCH | Refills: 3 | OUTPATIENT
Start: 2021-05-11

## 2021-06-23 ENCOUNTER — APPOINTMENT (OUTPATIENT)
Dept: RADIOLOGY | Facility: MEDICAL CENTER | Age: 66
End: 2021-06-23
Attending: EMERGENCY MEDICINE
Payer: COMMERCIAL

## 2021-06-23 ENCOUNTER — HOSPITAL ENCOUNTER (EMERGENCY)
Facility: MEDICAL CENTER | Age: 66
End: 2021-06-23
Attending: EMERGENCY MEDICINE
Payer: COMMERCIAL

## 2021-06-23 ENCOUNTER — APPOINTMENT (OUTPATIENT)
Dept: RADIOLOGY | Facility: MEDICAL CENTER | Age: 66
End: 2021-06-23
Payer: COMMERCIAL

## 2021-06-23 VITALS
SYSTOLIC BLOOD PRESSURE: 181 MMHG | HEART RATE: 88 BPM | DIASTOLIC BLOOD PRESSURE: 98 MMHG | OXYGEN SATURATION: 96 % | WEIGHT: 186.29 LBS | TEMPERATURE: 98.1 F | RESPIRATION RATE: 16 BRPM | BODY MASS INDEX: 29.94 KG/M2 | HEIGHT: 66 IN

## 2021-06-23 DIAGNOSIS — I10 HYPERTENSION, UNSPECIFIED TYPE: ICD-10-CM

## 2021-06-23 DIAGNOSIS — R07.9 CHEST PAIN, UNSPECIFIED TYPE: ICD-10-CM

## 2021-06-23 LAB
ALBUMIN SERPL BCP-MCNC: 4.4 G/DL (ref 3.2–4.9)
ALBUMIN/GLOB SERPL: 1.4 G/DL
ALP SERPL-CCNC: 101 U/L (ref 30–99)
ALT SERPL-CCNC: 22 U/L (ref 2–50)
ANION GAP SERPL CALC-SCNC: 13 MMOL/L (ref 7–16)
AST SERPL-CCNC: 27 U/L (ref 12–45)
BASOPHILS # BLD AUTO: 0.5 % (ref 0–1.8)
BASOPHILS # BLD: 0.04 K/UL (ref 0–0.12)
BILIRUB SERPL-MCNC: 0.5 MG/DL (ref 0.1–1.5)
BUN SERPL-MCNC: 9 MG/DL (ref 8–22)
CALCIUM SERPL-MCNC: 8.9 MG/DL (ref 8.5–10.5)
CHLORIDE SERPL-SCNC: 105 MMOL/L (ref 96–112)
CO2 SERPL-SCNC: 22 MMOL/L (ref 20–33)
CREAT SERPL-MCNC: 0.58 MG/DL (ref 0.5–1.4)
EKG IMPRESSION: NORMAL
EOSINOPHIL # BLD AUTO: 0.1 K/UL (ref 0–0.51)
EOSINOPHIL NFR BLD: 1.2 % (ref 0–6.9)
ERYTHROCYTE [DISTWIDTH] IN BLOOD BY AUTOMATED COUNT: 41.6 FL (ref 35.9–50)
GLOBULIN SER CALC-MCNC: 3.2 G/DL (ref 1.9–3.5)
GLUCOSE SERPL-MCNC: 107 MG/DL (ref 65–99)
HCT VFR BLD AUTO: 44.2 % (ref 37–47)
HGB BLD-MCNC: 15 G/DL (ref 12–16)
IMM GRANULOCYTES # BLD AUTO: 0.03 K/UL (ref 0–0.11)
IMM GRANULOCYTES NFR BLD AUTO: 0.4 % (ref 0–0.9)
LYMPHOCYTES # BLD AUTO: 1.28 K/UL (ref 1–4.8)
LYMPHOCYTES NFR BLD: 15.9 % (ref 22–41)
MCH RBC QN AUTO: 32.1 PG (ref 27–33)
MCHC RBC AUTO-ENTMCNC: 33.9 G/DL (ref 33.6–35)
MCV RBC AUTO: 94.4 FL (ref 81.4–97.8)
MONOCYTES # BLD AUTO: 0.53 K/UL (ref 0–0.85)
MONOCYTES NFR BLD AUTO: 6.6 % (ref 0–13.4)
NEUTROPHILS # BLD AUTO: 6.07 K/UL (ref 2–7.15)
NEUTROPHILS NFR BLD: 75.4 % (ref 44–72)
NRBC # BLD AUTO: 0 K/UL
NRBC BLD-RTO: 0 /100 WBC
PLATELET # BLD AUTO: 235 K/UL (ref 164–446)
PMV BLD AUTO: 11.1 FL (ref 9–12.9)
POTASSIUM SERPL-SCNC: 4 MMOL/L (ref 3.6–5.5)
PROT SERPL-MCNC: 7.6 G/DL (ref 6–8.2)
RBC # BLD AUTO: 4.68 M/UL (ref 4.2–5.4)
SODIUM SERPL-SCNC: 140 MMOL/L (ref 135–145)
TROPONIN T SERPL-MCNC: <6 NG/L (ref 6–19)
WBC # BLD AUTO: 8.1 K/UL (ref 4.8–10.8)

## 2021-06-23 PROCEDURE — 80053 COMPREHEN METABOLIC PANEL: CPT

## 2021-06-23 PROCEDURE — 84484 ASSAY OF TROPONIN QUANT: CPT

## 2021-06-23 PROCEDURE — 71045 X-RAY EXAM CHEST 1 VIEW: CPT

## 2021-06-23 PROCEDURE — 36415 COLL VENOUS BLD VENIPUNCTURE: CPT

## 2021-06-23 PROCEDURE — 93005 ELECTROCARDIOGRAM TRACING: CPT

## 2021-06-23 PROCEDURE — 85025 COMPLETE CBC W/AUTO DIFF WBC: CPT

## 2021-06-23 PROCEDURE — 93005 ELECTROCARDIOGRAM TRACING: CPT | Performed by: EMERGENCY MEDICINE

## 2021-06-23 PROCEDURE — 99284 EMERGENCY DEPT VISIT MOD MDM: CPT

## 2021-06-23 ASSESSMENT — FIBROSIS 4 INDEX: FIB4 SCORE: 1.35

## 2021-06-23 ASSESSMENT — LIFESTYLE VARIABLES: DO YOU DRINK ALCOHOL: NO

## 2021-06-23 NOTE — ED TRIAGE NOTES
"Lurdes Gloriasanford Ramon   66 y.o. female   Patient presents with:  Chief Complaint   Patient presents with   • Chest Pain     started on monday 06/21/21     Pt ambulated steady gait to triage for above complaint.     Patient awoke on Monday morning to chest pain.  Patient is guarding left rib cage.  Patient describes it as sore muscle.  Localized to under breast.  Tender to palpation and worse with movement. Went away while taking shower.     Pt is alert and oriented, speaking in full sentences, follows commands and responds appropriately to questions. NAD. Resp are even and unlabored.     Pt placed in waiting room. Pt educated on triage process. Pt encouraged to alert staff for any changes.    Patient and staff wearing appropriate PPE    BP (!) 175/103   Pulse 94   Temp 36.5 °C (97.7 °F) (Temporal)   Resp 16   Ht 1.676 m (5' 6\")   Wt 84.5 kg (186 lb 4.6 oz)   SpO2 94%   BMI 30.07 kg/m²     "

## 2021-06-24 NOTE — ED NOTES
Pt ambulated with a normal, steady gait to the room from the [facility]. Pt asked to change into a gown and placed on a cardiac monitor, pulse ox, and bp cuff.

## 2021-06-24 NOTE — ED PROVIDER NOTES
ED Provider Note  CHIEF COMPLAINT  Chief Complaint   Patient presents with   • Chest Pain     started on 21       [facility]  Lurdes Ramon is a 66 y.o. female who presents to the emergency department with complaining of focal left-sided chest discomfort. Past medical history significant for chronic back pain. Currently not treated for blood pressure or cholesterol. She does smoke however. Pain now persistent for three days. Notable aggravated leaving factors. No shortness of breath. No fever chills. No other recent illness. No other pain complaints. No nausea vomiting. No diaphoresis. No day night differentiation.    REVIEW OF SYSTEMS  See HPI for further details. All other systems are negative.     PAST MEDICAL HISTORY   has a past medical history of Back pain and Insomnia.    SOCIAL HISTORY  Social History     Tobacco Use   • Smoking status: Former Smoker     Packs/day: 1.00     Years: 15.00     Pack years: 15.00     Types: Cigarettes     Quit date: 10/26/1996     Years since quittin.6   • Smokeless tobacco: Never Used   Vaping Use   • Vaping Use: Never used   Substance and Sexual Activity   • Alcohol use: Yes     Alcohol/week: 0.0 oz     Comment: socially   • Drug use: No   • Sexual activity: Never       SURGICAL HISTORY   has a past surgical history that includes hysterectomy, total abdominal (); other orthopedic surgery; dental extraction(s); and lithotripsy.    CURRENT MEDICATIONS  Home Medications     Reviewed by Guicho Carney R.N. (Registered Nurse) on 21 at 1331  Med List Status: <None>   Medication Last Dose Status   ALPRAZolam (XANAX) 0.5 MG Tab  Active   Cholecalciferol (VITAMIN D PO)  Active   estradiol (VIVELLE DOT) 0.1 MG/24HR PATCH BIWEEKLY  Active   FLU BP MAXIMUM STRENGTH -2 MG Tab  Active   Multiple Vitamin (MULTI-VITAMINS) Tab  Active   valacyclovir (VALTREX) 1 GM Tab  Active   zolpidem (AMBIEN) 10 MG Tab  Active           "      ALLERGIES  Allergies   Allergen Reactions   • Pcn [Penicillins] Hives   • Sulfa Drugs Hives   • Clindamycin Shortness of Breath and Swelling       PHYSICAL EXAM  VITAL SIGNS: BP (!) 181/98   Pulse 88   Temp 36.7 °C (98.1 °F)   Resp 16   Ht 1.676 m (5' 6\")   Wt 84.5 kg (186 lb 4.6 oz)   SpO2 96%   BMI 30.07 kg/m²  @[id number]::@   Pulse ox interpretation: I interpret this pulse ox as normal.  Constitutional: Alert in no apparent distress.  HENT: No signs of trauma, Bilateral external ears normal, Nose normal.   Eyes: Pupils are equal and reactive  Neck: Normal range of motion, No tenderness, Supple  Cardiovascular: Regular rate and rhythm, no murmurs.   Thorax & Lungs: Normal breath sounds, No respiratory distress, No wheezing, No chest tenderness.   Abdomen: Bowel sounds normal, Soft  Skin: Warm, Dry, No erythema, No rash.   Extremities: Intact distal pulses  Musculoskeletal: Good range of motion in all major joints. No tenderness to palpation or major deformities noted.   Neurologic: Alert , Normal motor function, Normal sensory function, No focal deficits noted.   Psychiatric: Affect normal, Judgment normal, Mood normal.       DIAGNOSTIC STUDIES / PROCEDURES      LABS  Results for orders placed or performed during the hospital encounter of 06/23/21   CBC with Differential   Result Value Ref Range    WBC 8.1 4.8 - 10.8 K/uL    RBC 4.68 4.20 - 5.40 M/uL    Hemoglobin 15.0 12.0 - 16.0 g/dL    Hematocrit 44.2 37.0 - 47.0 %    MCV 94.4 81.4 - 97.8 fL    MCH 32.1 27.0 - 33.0 pg    MCHC 33.9 33.6 - 35.0 g/dL    RDW 41.6 35.9 - 50.0 fL    Platelet Count 235 164 - 446 K/uL    MPV 11.1 9.0 - 12.9 fL    Neutrophils-Polys 75.40 (H) 44.00 - 72.00 %    Lymphocytes 15.90 (L) 22.00 - 41.00 %    Monocytes 6.60 0.00 - 13.40 %    Eosinophils 1.20 0.00 - 6.90 %    Basophils 0.50 0.00 - 1.80 %    Immature Granulocytes 0.40 0.00 - 0.90 %    Nucleated RBC 0.00 /100 WBC    Neutrophils (Absolute) 6.07 2.00 - 7.15 K/uL    " Lymphs (Absolute) 1.28 1.00 - 4.80 K/uL    Monos (Absolute) 0.53 0.00 - 0.85 K/uL    Eos (Absolute) 0.10 0.00 - 0.51 K/uL    Baso (Absolute) 0.04 0.00 - 0.12 K/uL    Immature Granulocytes (abs) 0.03 0.00 - 0.11 K/uL    NRBC (Absolute) 0.00 K/uL   Complete Metabolic Panel (CMP)   Result Value Ref Range    Sodium 140 135 - 145 mmol/L    Potassium 4.0 3.6 - 5.5 mmol/L    Chloride 105 96 - 112 mmol/L    Co2 22 20 - 33 mmol/L    Anion Gap 13.0 7.0 - 16.0    Glucose 107 (H) 65 - 99 mg/dL    Bun 9 8 - 22 mg/dL    Creatinine 0.58 0.50 - 1.40 mg/dL    Calcium 8.9 8.5 - 10.5 mg/dL    AST(SGOT) 27 12 - 45 U/L    ALT(SGPT) 22 2 - 50 U/L    Alkaline Phosphatase 101 (H) 30 - 99 U/L    Total Bilirubin 0.5 0.1 - 1.5 mg/dL    Albumin 4.4 3.2 - 4.9 g/dL    Total Protein 7.6 6.0 - 8.2 g/dL    Globulin 3.2 1.9 - 3.5 g/dL    A-G Ratio 1.4 g/dL   Troponin   Result Value Ref Range    Troponin T <6 6 - 19 ng/L   ESTIMATED GFR   Result Value Ref Range    GFR If African American >60 >60 mL/min/1.73 m 2    GFR If Non African American >60 >60 mL/min/1.73 m 2   EKG   Result Value Ref Range    Report       [facility] Emergency Dept.    Test Date:  2021  Pt Name:    REMY PETERSEN                  Department: ER  MRN:        [id number]                      Room:  Gender:     Female                       Technician: [id number]  :        [id number]                   Requested By:ER TRIAGE PROTOCOL  Order #:    [id number]                    [place] MD: Carlos Leon    Measurements  Intervals                                Axis  Rate:       93                           P:          28  NJ:         128                          QRS:        22  QRSD:       78                           T:          50  QT:         372  QTc:        463    Interpretive Statements  SINUS RHYTHM  Compared to ECG 2018 12:34:13  No significant changes  Electronically Signed On 2021 17:34:44 PDT by Carlos Leon           RADIOLOGY  DX-CHEST-PORTABLE (1  VIEW)   Final Result      No acute cardiopulmonary findings.              COURSE & MEDICAL DECISION MAKING  Pertinent Labs & Imaging studies reviewed. (See chart for details)  66-year-old female presented emergency room with focal left-sided chest discomfort. History as above. Heart score is low. Troponin is negative. I do not believe that Delta troponin is required special given the prolonged nature of her chest is comfort over three day time period. At this point all discharged home with outpatient follow-up with primary care physician also additional referral to cardiology for outpatient follow-up and further potential ACS rule out if they deem necessary. Additionally the patient is understanding return precautions here the ER with any changes or worsening of the symptoms.      The patient will return for worsening symptoms and is stable at the time of discharge. The patient verbalizes understanding and will comply.    FINAL IMPRESSION  1. Chest pain, unspecified type    2. Hypertension, unspecified type            Electronically signed by: Carlos Leon M.D., 6/24/2021 12:17 AM

## 2021-06-25 ENCOUNTER — OFFICE VISIT (OUTPATIENT)
Dept: MEDICAL GROUP | Facility: PHYSICIAN GROUP | Age: 66
End: 2021-06-25
Payer: COMMERCIAL

## 2021-06-25 ENCOUNTER — TELEPHONE (OUTPATIENT)
Dept: MEDICAL GROUP | Facility: PHYSICIAN GROUP | Age: 66
End: 2021-06-25

## 2021-06-25 VITALS
OXYGEN SATURATION: 96 % | DIASTOLIC BLOOD PRESSURE: 92 MMHG | HEIGHT: 66 IN | SYSTOLIC BLOOD PRESSURE: 148 MMHG | WEIGHT: 188.4 LBS | TEMPERATURE: 97.9 F | HEART RATE: 80 BPM | BODY MASS INDEX: 30.28 KG/M2

## 2021-06-25 DIAGNOSIS — R07.89 ACUTE CHEST WALL PAIN: ICD-10-CM

## 2021-06-25 DIAGNOSIS — Z12.11 COLON CANCER SCREENING: ICD-10-CM

## 2021-06-25 DIAGNOSIS — G89.29 CHRONIC BILATERAL LOW BACK PAIN WITH RIGHT-SIDED SCIATICA: ICD-10-CM

## 2021-06-25 DIAGNOSIS — G47.00 INSOMNIA, UNSPECIFIED TYPE: ICD-10-CM

## 2021-06-25 DIAGNOSIS — Z00.00 WELLNESS EXAMINATION: ICD-10-CM

## 2021-06-25 DIAGNOSIS — M54.41 CHRONIC BILATERAL LOW BACK PAIN WITH RIGHT-SIDED SCIATICA: ICD-10-CM

## 2021-06-25 DIAGNOSIS — Z78.0 POSTMENOPAUSAL ESTROGEN DEFICIENCY: ICD-10-CM

## 2021-06-25 PROCEDURE — 99214 OFFICE O/P EST MOD 30 MIN: CPT | Performed by: FAMILY MEDICINE

## 2021-06-25 RX ORDER — HYDROCODONE BITARTRATE AND ACETAMINOPHEN 5; 325 MG/1; MG/1
TABLET ORAL
Qty: 30 TABLET | Refills: 0 | Status: SHIPPED | OUTPATIENT
Start: 2021-06-25 | End: 2021-08-02 | Stop reason: SDUPTHER

## 2021-06-25 RX ORDER — ZOLPIDEM TARTRATE 10 MG/1
10 TABLET ORAL NIGHTLY PRN
Qty: 30 TABLET | Refills: 0 | Status: SHIPPED | OUTPATIENT
Start: 2021-06-25 | End: 2021-08-02 | Stop reason: SDUPTHER

## 2021-06-25 ASSESSMENT — FIBROSIS 4 INDEX: FIB4 SCORE: 1.62

## 2021-06-25 NOTE — ASSESSMENT & PLAN NOTE
This is a chronic problem.  Patient is asking for refill on her pain medicines and states that she will use the sparingly.  She tries to get by on only half a pill a day.

## 2021-06-25 NOTE — ASSESSMENT & PLAN NOTE
Patient does have several wellness issues to be addressed today.  She is due for colonoscopy and after discussion decided on the Cologuard.  She also needs to have a DEXA scan as that is never been done.  She also had questions about the shingles vaccine but thinks she never had chickenpox.  She like to get a titer done before having the vaccine.

## 2021-06-25 NOTE — PROGRESS NOTES
Subjective:     CC: Here to discuss various medical issues.    HPI:   Lurdes presents today with  Medical concerns:    Insomnia  This is a chronic problem.  Patient been trying to go without her Ambien for the last several months but finding it more difficult.  She does have more pain during the day when she does not sleep well and is asking for refill on her Ambien to use sparingly.    Chronic bilateral low back pain with right-sided sciatica  This is a chronic problem.  Patient is asking for refill on her pain medicines and states that she will use the sparingly.  She tries to get by on only half a pill a day.    Wellness examination  Patient does have several wellness issues to be addressed today.  She is due for colonoscopy and after discussion decided on the Cologuard.  She also needs to have a DEXA scan as that is never been done.  She also had questions about the shingles vaccine but thinks she never had chickenpox.  She like to get a titer done before having the vaccine.    Acute chest wall pain  This is a new problem.  Patient had several weeks of left lower mid chest pain.  She went to the emergency room and everything was said to be normal.  She is here for follow-up on that.  States the pain happened without any known trauma.  Its painful when pushed upon.  No other symptoms.      Past Medical History:   Diagnosis Date   • Back pain    • Insomnia        Social History     Tobacco Use   • Smoking status: Former Smoker     Packs/day: 1.00     Years: 15.00     Pack years: 15.00     Types: Cigarettes     Quit date: 10/26/1996     Years since quittin.6   • Smokeless tobacco: Never Used   Vaping Use   • Vaping Use: Never used   Substance Use Topics   • Alcohol use: Yes     Alcohol/week: 0.0 oz     Comment: socially   • Drug use: No       Current Outpatient Medications Ordered in Epic   Medication Sig Dispense Refill   • HYDROcodone-acetaminophen (NORCO) 5-325 MG Tab per tablet 1/2-1 tab per day prn  "moderate/severe pain 30 tablet 0   • zolpidem (AMBIEN) 10 MG Tab Take 1 tablet by mouth at bedtime as needed for Sleep for up to 30 days. 30 tablet 0   • estradiol (VIVELLE DOT) 0.1 MG/24HR PATCH BIWEEKLY APPLY 1 PATCH EXTERNALLY TO THE SKIN 2 TIMES A WEEK AS DIRECTED 24 Patch 3   • valacyclovir (VALTREX) 1 GM Tab 2 po bid for 1 day prn outbreak 20 tablet 5   • Cholecalciferol (VITAMIN D PO) Take  by mouth.     • Multiple Vitamin (MULTI-VITAMINS) Tab Take 1 tablet by mouth.       No current Epic-ordered facility-administered medications on file.       Allergies:  Pcn [penicillins], Sulfa drugs, and Clindamycin    Health Maintenance: Completed    ROS:  Gen: no fevers/chills, no changes in weight  Eyes: no changes in vision  ENT: no sore throat, no hearing loss, no bloody nose  Pulm: no sob, no cough  CV: no chest pain, no palpitations  GI: no nausea/vomiting, no diarrhea  : no dysuria  MSk: no myalgias  Skin: no rash  Neuro: no headaches, no numbness/tingling  Heme/Lymph: no easy bruising      Objective:       Exam:  /92 (BP Location: Left arm, Patient Position: Sitting, BP Cuff Size: Large adult)   Pulse 80   Temp 36.6 °C (97.9 °F) (Temporal)   Ht 1.676 m (5' 6\")   Wt 85.5 kg (188 lb 6.4 oz)   SpO2 96%   BMI 30.41 kg/m²  Body mass index is 30.41 kg/m².    Gen: Alert and oriented, No apparent distress.  Chest: Patient has tenderness on palpation of where the left 12th rib joins the 11th.  It reproduces her pain completely.  No swelling or masses noted.  No overlying skin changes noted.  Abdomen: Benign.  Ext: No clubbing, cyanosis, edema.  Psych: Patient is alert and oriented x3.  No unusual thought process expressed.  Insight and judgment appears good.      Labs: Previous labs reviewed.    Assessment & Plan:     66 y.o. female with the following -     1. Chronic bilateral low back pain with right-sided sciatica  This is a chronic problem.  Patient's controlled substance treatment agreement was signed " today.  Also gave her a refill on her norco and told to use it sparingly.  - HYDROcodone-acetaminophen (NORCO) 5-325 MG Tab per tablet; 1/2-1 tab per day prn moderate/severe pain  Dispense: 30 tablet; Refill: 0  - Controlled Substance Treatment Agreement    2. Postmenopausal estrogen deficiency  This is a screening issue.  DEXA scan ordered.  Should patient be notified of the results.  - DS-BONE DENSITY STUDY (DEXA); Future    3. Insomnia, unspecified type  This is a chronic problem.  Patient told to use the medication sparingly.  - zolpidem (AMBIEN) 10 MG Tab; Take 1 tablet by mouth at bedtime as needed for Sleep for up to 30 days.  Dispense: 30 tablet; Refill: 0    4. Colon cancer screening  This is a screening issue.  Cologuard ordered.  - COLOGUARD (FIT DNA)    5. Wellness examination  Patient's wellness issues discussed.  We will get a titer on her varicella and if it is positive recommend that she think about the Shingrix vaccine.  - VARICELLA ZOSTER IGG AB; Future    6.  Chest pain  This is a new problem.  Patient was told her pain is most likely a costochondritis.  She can try over-the-counter diclofenac gel on it and heating pad.  Chest x-ray and labs in the ER were normal.  If she has persistent pain we could get a rib series x-rays.    7.  Elevated blood pressure  Patient's blood pressure was mildly elevated today.  We will have her come back for some serial checks.  If remains elevated will start on appropriate medication.    Return if symptoms worsen or fail to improve.    Please note that this dictation was created using voice recognition software. I have made every reasonable attempt to correct obvious errors, but I expect that there are errors of grammar and possibly content that I did not discover before finalizing the note.

## 2021-06-25 NOTE — ASSESSMENT & PLAN NOTE
This is a chronic problem.  Patient been trying to go without her Ambien for the last several months but finding it more difficult.  She does have more pain during the day when she does not sleep well and is asking for refill on her Ambien to use sparingly.

## 2021-06-25 NOTE — ASSESSMENT & PLAN NOTE
This is a new problem.  Patient had several weeks of left lower mid chest pain.  She went to the emergency room and everything was said to be normal.  She is here for follow-up on that.  States the pain happened without any known trauma.  Its painful when pushed upon.  No other symptoms.

## 2021-06-25 NOTE — TELEPHONE ENCOUNTER
Phone Number Called: [phone]    Call outcome: Left detailed message for patient. Informed to call back with any additional questions.   Called pt to return in 1 week per Dr Rogelio Lindo verbal order to recheck blood pressure since it was elevated today.

## 2021-07-01 ENCOUNTER — NON-PROVIDER VISIT (OUTPATIENT)
Dept: MEDICAL GROUP | Facility: PHYSICIAN GROUP | Age: 66
End: 2021-07-01
Payer: COMMERCIAL

## 2021-07-01 VITALS — SYSTOLIC BLOOD PRESSURE: 128 MMHG | DIASTOLIC BLOOD PRESSURE: 76 MMHG

## 2021-07-01 PROCEDURE — 99999 PR NO CHARGE: CPT | Performed by: FAMILY MEDICINE

## 2021-07-01 NOTE — PROGRESS NOTES
Lurdes Ramon is a 66 y.o. female here for a non-provider visit for B/P    Vitals:    07/01/21 1106   BP: 128/76   BP Location: Right arm   Patient Position: Sitting   BP Cuff Size: Large adult     If abnormal, was the Registered Nurse (office provider if RN is unavailable) notified today? Yes    Routed to PCP? Yes

## 2021-07-15 ENCOUNTER — OFFICE VISIT (OUTPATIENT)
Dept: URGENT CARE | Facility: PHYSICIAN GROUP | Age: 66
End: 2021-07-15
Payer: COMMERCIAL

## 2021-07-15 VITALS
RESPIRATION RATE: 20 BRPM | DIASTOLIC BLOOD PRESSURE: 82 MMHG | HEIGHT: 66 IN | SYSTOLIC BLOOD PRESSURE: 148 MMHG | BODY MASS INDEX: 30.05 KG/M2 | TEMPERATURE: 97.7 F | WEIGHT: 187 LBS | HEART RATE: 79 BPM | OXYGEN SATURATION: 95 %

## 2021-07-15 DIAGNOSIS — B37.31 CANDIDA VAGINITIS: ICD-10-CM

## 2021-07-15 LAB
APPEARANCE UR: NORMAL
BILIRUB UR STRIP-MCNC: NEGATIVE MG/DL
COLOR UR AUTO: YELLOW
GLUCOSE UR STRIP.AUTO-MCNC: NEGATIVE MG/DL
KETONES UR STRIP.AUTO-MCNC: NEGATIVE MG/DL
LEUKOCYTE ESTERASE UR QL STRIP.AUTO: NEGATIVE
NITRITE UR QL STRIP.AUTO: NEGATIVE
PH UR STRIP.AUTO: 6.5 [PH] (ref 5–8)
PROT UR QL STRIP: NEGATIVE MG/DL
RBC UR QL AUTO: NORMAL
SP GR UR STRIP.AUTO: 1.02
UROBILINOGEN UR STRIP-MCNC: 0.2 MG/DL

## 2021-07-15 PROCEDURE — 81002 URINALYSIS NONAUTO W/O SCOPE: CPT | Performed by: FAMILY MEDICINE

## 2021-07-15 PROCEDURE — 99213 OFFICE O/P EST LOW 20 MIN: CPT | Performed by: FAMILY MEDICINE

## 2021-07-15 RX ORDER — FLUCONAZOLE 150 MG/1
150 TABLET ORAL DAILY
Qty: 2 TABLET | Refills: 0 | Status: SHIPPED | OUTPATIENT
Start: 2021-07-15 | End: 2021-11-19

## 2021-07-15 ASSESSMENT — ENCOUNTER SYMPTOMS
CHILLS: 0
FEVER: 0
ABDOMINAL PAIN: 0

## 2021-07-15 ASSESSMENT — FIBROSIS 4 INDEX: FIB4 SCORE: 1.62

## 2021-07-15 NOTE — PROGRESS NOTES
"Subjective:      Lurdes Ramon is a 66 y.o. female who presents with Vaginitis (2x days; smell, itch. )            2 days vaginal discharge and itching.  Discharge appears like cottage cheese.  PMH yeast infection and symptoms of the same.  No recent antibiotic use.  No concern for STI.  No urinary symptoms.  No fever.  Symptoms were severe yesterday.  Slightly improved today after using Monistat.  No other aggravating or alleviating factors.      Review of Systems   Constitutional: Negative for chills and fever.   Gastrointestinal: Negative for abdominal pain.   Genitourinary: Negative for dysuria, frequency, hematuria and urgency.          Objective:     /82 (BP Location: Left arm, Patient Position: Sitting, BP Cuff Size: Adult)   Pulse 79   Temp 36.5 °C (97.7 °F) (Temporal)   Resp 20   Ht 1.676 m (5' 6\")   Wt 84.8 kg (187 lb)   SpO2 95%   BMI 30.18 kg/m²      Physical Exam  Constitutional:       General: She is not in acute distress.     Appearance: She is well-developed.   HENT:      Head: Normocephalic and atraumatic.   Eyes:      Conjunctiva/sclera: Conjunctivae normal.   Genitourinary:     Comments: Deferred per patient request  Skin:     General: Skin is warm and dry.      Findings: No rash.   Neurological:      Mental Status: She is alert.                        Assessment/Plan:      UA reviewed    1. Candida vaginitis    - fluconazole (DIFLUCAN) 150 MG tablet; Take 1 tablet by mouth every day.  Dispense: 2 tablet; Refill: 0    Differential diagnosis, natural history, supportive care, and indications for immediate follow-up discussed at length.     Incidental finding of small hematuria on urinalysis.  Lurdes has had recurrent hematuria for years and has been evaluated for this in the past.  She will follow-up with her primary care.  "

## 2021-08-02 DIAGNOSIS — G47.00 INSOMNIA, UNSPECIFIED TYPE: ICD-10-CM

## 2021-08-02 DIAGNOSIS — M54.41 CHRONIC BILATERAL LOW BACK PAIN WITH RIGHT-SIDED SCIATICA: ICD-10-CM

## 2021-08-02 DIAGNOSIS — G89.29 CHRONIC BILATERAL LOW BACK PAIN WITH RIGHT-SIDED SCIATICA: ICD-10-CM

## 2021-08-03 RX ORDER — HYDROCODONE BITARTRATE AND ACETAMINOPHEN 5; 325 MG/1; MG/1
TABLET ORAL
Qty: 30 TABLET | Refills: 0 | Status: SHIPPED | OUTPATIENT
Start: 2021-08-03 | End: 2021-09-09 | Stop reason: SDUPTHER

## 2021-08-03 RX ORDER — ZOLPIDEM TARTRATE 10 MG/1
10 TABLET ORAL NIGHTLY PRN
Qty: 30 TABLET | Refills: 0 | Status: SHIPPED | OUTPATIENT
Start: 2021-08-03 | End: 2021-08-31 | Stop reason: SDUPTHER

## 2021-08-09 ENCOUNTER — TELEPHONE (OUTPATIENT)
Dept: MEDICAL GROUP | Facility: PHYSICIAN GROUP | Age: 66
End: 2021-08-09

## 2021-08-09 VITALS — HEART RATE: 71 BPM | DIASTOLIC BLOOD PRESSURE: 84 MMHG | SYSTOLIC BLOOD PRESSURE: 135 MMHG

## 2021-08-09 NOTE — TELEPHONE ENCOUNTER
----- Message from Lurdes Ramon sent at 8/6/2021  7:28 PM PDT -----  Regarding: RE:Blood Pressure  Contact: [phone]  Hi Nurse Ashley JIMENEZ    My blood pressure reading on Friday, August 6, 2021, at 10 a.m. was: 135/84/71 pulse.  This was taken with a wrist cuff from [facility].    Thank you!    Lurdes Ramon

## 2021-08-27 DIAGNOSIS — F41.9 ANXIETY: ICD-10-CM

## 2021-08-27 RX ORDER — ALPRAZOLAM 0.5 MG/1
0.5 TABLET ORAL 3 TIMES DAILY PRN
Qty: 10 TABLET | Refills: 0 | Status: SHIPPED | OUTPATIENT
Start: 2021-08-27 | End: 2021-09-06

## 2021-08-31 DIAGNOSIS — G47.00 INSOMNIA, UNSPECIFIED TYPE: ICD-10-CM

## 2021-09-01 RX ORDER — ZOLPIDEM TARTRATE 10 MG/1
10 TABLET ORAL NIGHTLY PRN
Qty: 30 TABLET | Refills: 0 | Status: SHIPPED | OUTPATIENT
Start: 2021-09-01 | End: 2021-09-27 | Stop reason: SDUPTHER

## 2021-09-03 ENCOUNTER — OFFICE VISIT (OUTPATIENT)
Dept: URGENT CARE | Facility: PHYSICIAN GROUP | Age: 66
End: 2021-09-03
Payer: COMMERCIAL

## 2021-09-03 ENCOUNTER — HOSPITAL ENCOUNTER (OUTPATIENT)
Facility: MEDICAL CENTER | Age: 66
End: 2021-09-03
Attending: NURSE PRACTITIONER
Payer: COMMERCIAL

## 2021-09-03 VITALS
TEMPERATURE: 97.6 F | WEIGHT: 186 LBS | OXYGEN SATURATION: 95 % | HEIGHT: 66 IN | RESPIRATION RATE: 16 BRPM | SYSTOLIC BLOOD PRESSURE: 158 MMHG | HEART RATE: 83 BPM | BODY MASS INDEX: 29.89 KG/M2 | DIASTOLIC BLOOD PRESSURE: 96 MMHG

## 2021-09-03 DIAGNOSIS — N89.8 VAGINAL DISCHARGE: ICD-10-CM

## 2021-09-03 DIAGNOSIS — B37.31 YEAST INFECTION OF THE VAGINA: ICD-10-CM

## 2021-09-03 LAB
CANDIDA DNA VAG QL PROBE+SIG AMP: NEGATIVE
G VAGINALIS DNA VAG QL PROBE+SIG AMP: NEGATIVE
T VAGINALIS DNA VAG QL PROBE+SIG AMP: NEGATIVE

## 2021-09-03 PROCEDURE — 87510 GARDNER VAG DNA DIR PROBE: CPT

## 2021-09-03 PROCEDURE — 87660 TRICHOMONAS VAGIN DIR PROBE: CPT

## 2021-09-03 PROCEDURE — 87480 CANDIDA DNA DIR PROBE: CPT

## 2021-09-03 PROCEDURE — 99213 OFFICE O/P EST LOW 20 MIN: CPT | Performed by: NURSE PRACTITIONER

## 2021-09-03 RX ORDER — FLUCONAZOLE 150 MG/1
150 TABLET ORAL ONCE
Qty: 2 TABLET | Refills: 0 | Status: SHIPPED | OUTPATIENT
Start: 2021-09-03 | End: 2021-09-03

## 2021-09-03 ASSESSMENT — ENCOUNTER SYMPTOMS
FEVER: 0
FLANK PAIN: 0
CHILLS: 0
MYALGIAS: 0
ABDOMINAL PAIN: 0
HEADACHES: 0
DIZZINESS: 0
WEAKNESS: 0
VOMITING: 0
DIARRHEA: 0
BACK PAIN: 0
CONSTIPATION: 0
NAUSEA: 0

## 2021-09-03 ASSESSMENT — FIBROSIS 4 INDEX: FIB4 SCORE: 1.62

## 2021-09-03 NOTE — PROGRESS NOTES
Subjective     Lurdes Ramon is a 66 y.o. female who presents with Vaginal Discharge (cottage cheese resemblance, fish odor)            HPI  States has been experiencing thick white vaginal discharge with foul odor. Has had his before 2 months ago abd was treated for yeast infection. Has tried over the counter Monistat one day treatment twice with no help.     PMH:  has a past medical history of Back pain and Insomnia.  MEDS:   Current Outpatient Medications:   •  fluconazole (DIFLUCAN) 150 MG tablet, Take 1 Tablet by mouth one time for 1 dose. May repeat in 72 hrs if needed., Disp: 2 Tablet, Rfl: 0  •  zolpidem (AMBIEN) 10 MG Tab, Take 1 Tablet by mouth at bedtime as needed for Sleep for up to 30 days., Disp: 30 Tablet, Rfl: 0  •  ALPRAZolam (XANAX) 0.5 MG Tab, Take 1 Tablet by mouth 3 times a day as needed for Anxiety for up to 10 days., Disp: 10 Tablet, Rfl: 0  •  fluconazole (DIFLUCAN) 150 MG tablet, Take 1 tablet by mouth every day., Disp: 2 tablet, Rfl: 0  •  estradiol (VIVELLE DOT) 0.1 MG/24HR PATCH BIWEEKLY, APPLY 1 PATCH EXTERNALLY TO THE SKIN 2 TIMES A WEEK AS DIRECTED, Disp: 24 Patch, Rfl: 3  •  valacyclovir (VALTREX) 1 GM Tab, 2 po bid for 1 day prn outbreak, Disp: 20 tablet, Rfl: 5  •  Cholecalciferol (VITAMIN D PO), Take  by mouth., Disp: , Rfl:   •  Multiple Vitamin (MULTI-VITAMINS) Tab, Take 1 tablet by mouth., Disp: , Rfl:   ALLERGIES:   Allergies   Allergen Reactions   • Pcn [Penicillins] Hives   • Sulfa Drugs Hives   • Clindamycin Shortness of Breath and Swelling     SURGHX:   Past Surgical History:   Procedure Laterality Date   • HYSTERECTOMY, TOTAL ABDOMINAL  1994   • DENTAL EXTRACTION(S)     • LITHOTRIPSY     • OTHER ORTHOPEDIC SURGERY      back surgery x 2     SOCHX:  reports that she quit smoking about 24 years ago. Her smoking use included cigarettes. She has a 15.00 pack-year smoking history. She has never used smokeless tobacco. She reports current alcohol use. She reports that she  "does not use drugs.  FH: Family history was reviewed, no pertinent findings to report    Review of Systems   Constitutional: Negative for chills, fever and malaise/fatigue.   Gastrointestinal: Negative for abdominal pain, constipation, diarrhea, nausea and vomiting.   Genitourinary: Negative for dysuria, flank pain, frequency, hematuria and urgency.        Vaginal discharge.    Musculoskeletal: Negative for back pain and myalgias.   Skin: Negative for itching and rash.   Neurological: Negative for dizziness, weakness and headaches.   All other systems reviewed and are negative.             Objective     /96 (BP Location: Left arm, Patient Position: Sitting, BP Cuff Size: Adult)   Pulse 83   Temp 36.4 °C (97.6 °F) (Temporal)   Resp 16   Ht 1.676 m (5' 6\")   Wt 84.4 kg (186 lb)   SpO2 95%   BMI 30.02 kg/m²      Physical Exam  Vitals reviewed.   Constitutional:       General: She is awake. She is not in acute distress.     Appearance: Normal appearance. She is well-developed. She is not ill-appearing, toxic-appearing or diaphoretic.   HENT:      Head: Normocephalic.   Eyes:      Conjunctiva/sclera: Conjunctivae normal.      Pupils: Pupils are equal, round, and reactive to light.   Cardiovascular:      Rate and Rhythm: Normal rate.   Pulmonary:      Effort: Pulmonary effort is normal.   Genitourinary:     Comments: Patient opts to self swab.  Musculoskeletal:         General: Normal range of motion.      Cervical back: Normal range of motion and neck supple.   Skin:     General: Skin is warm and dry.   Neurological:      Mental Status: She is alert and oriented to person, place, and time.   Psychiatric:         Attention and Perception: Attention normal.         Mood and Affect: Mood normal.         Speech: Speech normal.         Behavior: Behavior normal. Behavior is cooperative.         Thought Content: Thought content normal.         Cognition and Memory: Cognition and memory normal.         Judgment: " Judgment normal.                             Assessment & Plan        1. Yeast infection of the vagina    - fluconazole (DIFLUCAN) 150 MG tablet; Take 1 Tablet by mouth one time for 1 dose. May repeat in 72 hrs if needed.  Dispense: 2 Tablet; Refill: 0    2. Vaginal discharge    - VAGINAL PATHOGENS DNA PANEL; Future     -Patient notified that if yeast infection reoccurs or persists, use over the counter Miconazole/Clotrimazole external vaginal cream for 7 days instead of one or three day treatment   -Increase water intake  -Urinate more frequently and empty bladder completely  -Practice good toileting hygiene after bowel movements and sexual intercourse, refrain from sexual intercourse until infection cleared  -Monitor for back/flank pain, difficulty urinating, blood in urine- need re-evaluation       MyChart release of vaginal swab result

## 2021-09-09 DIAGNOSIS — M54.41 CHRONIC BILATERAL LOW BACK PAIN WITH RIGHT-SIDED SCIATICA: ICD-10-CM

## 2021-09-09 DIAGNOSIS — G89.29 CHRONIC BILATERAL LOW BACK PAIN WITH RIGHT-SIDED SCIATICA: ICD-10-CM

## 2021-09-13 RX ORDER — HYDROCODONE BITARTRATE AND ACETAMINOPHEN 5; 325 MG/1; MG/1
TABLET ORAL
Qty: 30 TABLET | Refills: 0 | Status: SHIPPED | OUTPATIENT
Start: 2021-09-13 | End: 2021-10-08 | Stop reason: SDUPTHER

## 2021-09-20 ENCOUNTER — TELEMEDICINE (OUTPATIENT)
Dept: MEDICAL GROUP | Facility: PHYSICIAN GROUP | Age: 66
End: 2021-09-20
Payer: COMMERCIAL

## 2021-09-20 ENCOUNTER — PATIENT MESSAGE (OUTPATIENT)
Dept: MEDICAL GROUP | Facility: PHYSICIAN GROUP | Age: 66
End: 2021-09-20

## 2021-09-20 VITALS — HEIGHT: 66 IN | BODY MASS INDEX: 28.12 KG/M2 | WEIGHT: 175 LBS

## 2021-09-20 DIAGNOSIS — M54.41 CHRONIC BILATERAL LOW BACK PAIN WITH RIGHT-SIDED SCIATICA: ICD-10-CM

## 2021-09-20 DIAGNOSIS — G89.29 CHRONIC BILATERAL LOW BACK PAIN WITH RIGHT-SIDED SCIATICA: ICD-10-CM

## 2021-09-20 PROBLEM — Z00.00 WELLNESS EXAMINATION: Status: RESOLVED | Noted: 2019-04-02 | Resolved: 2021-09-20

## 2021-09-20 PROCEDURE — 99213 OFFICE O/P EST LOW 20 MIN: CPT | Mod: 95 | Performed by: FAMILY MEDICINE

## 2021-09-20 ASSESSMENT — FIBROSIS 4 INDEX: FIB4 SCORE: 1.62

## 2021-09-20 NOTE — ASSESSMENT & PLAN NOTE
This is a chronic problem.  Patient states about a week or 2 ago she bent over and felt something suddenly heard in the lower back and is now having sciatica down both legs.  The pain was severe she was unable to get out of bed for 1 day.  She did call [facility] who asked for current x-ray studies before they would see her.  She has had previous laminectomy and fusion.  These were done back about 14 years ago.

## 2021-09-20 NOTE — PROGRESS NOTES
Virtual Visit: Established Patient   This visit was conducted via Zoom using secure and encrypted videoconferencing technology.   The patient was in a private location in the state of [place].    The patient's identity was confirmed and verbal consent was obtained for this virtual visit.    Subjective:   CC:   Chief Complaint   Patient presents with   • Back Pain     increasing pain in the spine       Lurdes Ramon is a 66 y.o. female presenting for evaluation and management of:    Chronic bilateral low back pain with right-sided sciatica  This is a chronic problem.  Patient states about a week or 2 ago she bent over and felt something suddenly heard in the lower back and is now having sciatica down both legs.  The pain was severe she was unable to get out of bed for 1 day.  She did call [facility] back surgeon who asked for current x-ray studies before they would see her.  She has had previous laminectomy and fusion.  These were done back about 14 years ago.        ROS   No shortness of breath or chest pain    Current medicines (including changes today)  Current Outpatient Medications   Medication Sig Dispense Refill   • HYDROcodone-acetaminophen (NORCO) 5-325 MG Tab per tablet 1/2-1 tab per day prn moderate/severe pain for 30 days 30 Tablet 0   • zolpidem (AMBIEN) 10 MG Tab Take 1 Tablet by mouth at bedtime as needed for Sleep for up to 30 days. 30 Tablet 0   • estradiol (VIVELLE DOT) 0.1 MG/24HR PATCH BIWEEKLY APPLY 1 PATCH EXTERNALLY TO THE SKIN 2 TIMES A WEEK AS DIRECTED 24 Patch 3   • valacyclovir (VALTREX) 1 GM Tab 2 po bid for 1 day prn outbreak 20 tablet 5   • Multiple Vitamin (MULTI-VITAMINS) Tab Take 1 tablet by mouth.     • fluconazole (DIFLUCAN) 150 MG tablet Take 1 tablet by mouth every day. 2 tablet 0   • Cholecalciferol (VITAMIN D PO) Take  by mouth.       No current facility-administered medications for this visit.       Patient Active Problem List    Diagnosis Date Noted   • Acute chest wall  "pain 06/25/2021   • Other chronic postprocedural pain 05/05/2021   • Chronic fatigue 05/05/2021   • Avitaminosis D 04/29/2019   • Cold sore 04/29/2019   • Vertigo 01/17/2018   • Hormone replacement therapy (HRT) 01/25/2017   • Insomnia 10/26/2016   • Chronic bilateral low back pain with right-sided sciatica 10/26/2016   • Anxiety 10/26/2016        Objective:   Ht 1.676 m (5' 6\") Comment: per pt  Wt 79.4 kg (175 lb) Comment: per pt  BMI 28.25 kg/m²     Physical Exam:  Constitutional: Alert, no distress, well-groomed.  Skin: No rashes in visible areas.  Eye: Round.  No icterus.   ENMT: Lips pink without lesions, . Phonation normal.  Respiratory: Unlabored respiratory effort, no cough or audible wheeze  Psych: Alert and oriented x3, normal affect and mood.     Assessment and Plan:   The following treatment plan was discussed:     1. Chronic bilateral low back pain with right-sided sciatica  Discussed issue with patient.  This is a chronic problem with acute exacerbation.  Will order an x-ray and see if an MRI can be done given her previous surgery if not she may have to have a CAT scan.  When she gets those results she will forward those to the clinic in [place] that she is talking to.  - DX-LUMBAR SPINE-2 OR 3 VIEWS; Future  - MR-LUMBAR SPINE-W/O; Future      Follow-up: Return if symptoms worsen or fail to improve.  22 minutes spent with patient.       "

## 2021-09-21 NOTE — TELEPHONE ENCOUNTER
"From: Lurdes Ramon  To: Physician Rogelio Lindo  Sent: 9/20/2021 5:46 PM PDT  Subject: Cologuard - Completed July 12, 2021, and result available    Hi Dr. Lindo and Office.    Just a note, as I noticed my records seem to say that I didn't do the Colorectal Cancer Screening yet, but I am thinking this Cologuard box sample was a type of colorectal cancer screening.    I completed the Cologuard, \"poop in a box,\" test ordered by Dr. Lindo, on July 12, 2021, and mailed it in on that same day. About two weeks later, I received a letter from [facility] saying my test result was available and was sent to my healthcare provider's office. That would have been in late July or early August 2021.    Attached is the letter that [facility] sent me saying the results had been sent in to the healthcare provider. Hopefully, these results came to your office and are what was needed to complete the colorectal cancer screening.    Thank you!  Lurdes"

## 2021-09-27 DIAGNOSIS — G47.00 INSOMNIA, UNSPECIFIED TYPE: ICD-10-CM

## 2021-09-28 RX ORDER — ZOLPIDEM TARTRATE 10 MG/1
10 TABLET ORAL NIGHTLY PRN
Qty: 30 TABLET | Refills: 0 | Status: SHIPPED | OUTPATIENT
Start: 2021-09-28 | End: 2021-10-26 | Stop reason: SDUPTHER

## 2021-10-01 ENCOUNTER — HOSPITAL ENCOUNTER (OUTPATIENT)
Dept: RADIOLOGY | Facility: MEDICAL CENTER | Age: 66
End: 2021-10-01
Attending: FAMILY MEDICINE
Payer: COMMERCIAL

## 2021-10-01 DIAGNOSIS — M54.41 CHRONIC BILATERAL LOW BACK PAIN WITH RIGHT-SIDED SCIATICA: ICD-10-CM

## 2021-10-01 DIAGNOSIS — G89.29 CHRONIC BILATERAL LOW BACK PAIN WITH RIGHT-SIDED SCIATICA: ICD-10-CM

## 2021-10-01 PROCEDURE — 72100 X-RAY EXAM L-S SPINE 2/3 VWS: CPT

## 2021-10-01 PROCEDURE — A9270 NON-COVERED ITEM OR SERVICE: HCPCS | Performed by: RADIOLOGY

## 2021-10-01 PROCEDURE — 700102 HCHG RX REV CODE 250 W/ 637 OVERRIDE(OP): Performed by: RADIOLOGY

## 2021-10-01 PROCEDURE — 72148 MRI LUMBAR SPINE W/O DYE: CPT

## 2021-10-01 RX ORDER — DIAZEPAM 5 MG/1
5 TABLET ORAL
Status: COMPLETED | OUTPATIENT
Start: 2021-10-01 | End: 2021-10-01

## 2021-10-01 RX ADMIN — DIAZEPAM 5 MG: 5 TABLET ORAL at 08:50

## 2021-10-01 NOTE — DISCHARGE INSTRUCTIONS
MRI ADULT DISCHARGE INSTRUCTIONS    You have been medicated today for your scan. Please follow the instructions below to ensure your safe recovery. If you have any questions or problems, feel free to call us at [phone] or [phone].     1.   Have someone stay with you to assist you as needed.    2.   Do not drive or operate any mechanical devices.    3.   Do not perform any activity that requires concentration. Make no major decisions over the next 24 hours.     4.   Be careful changing positions from laying down to sitting or standing, as you may become dizzy.     5.   Do not drink alcohol for 48 hours.    6.   There are no restrictions for eating your normal meals. Drink fluids.    7.   You may continue your usual medications for pain, tranquilizers, muscle relaxants or sedatives when awake.     8.   Tomorrow, you may continue your normal daily activities.     9.   Pressure dressing on 10 - 15 minutes. If swelling or bleeding occurs when removed, continue placing direct pressure on injection site for another 5 minutes, or until bleeding stops.     I have been informed of and understand the above discharge instructions. Diazepam (VALIUM) Oral solution  What is this medicine?  You were prescribed DIAZEPAM (dye AZ e heidi) for the procedure you had today. This medication is a benzodiazepine. It is used to treat anxiety and nervousness. It also can help treat alcohol withdrawal, relax muscles, and treat certain types of seizures.  This medicine may be used for other purposes; ask your health care provider or pharmacist if you have questions.  What side effects may I notice from receiving this medicine?  Side effects that you should report to your doctor or health care professional as soon as possible:  • allergic reactions like skin rash, itching or hives, swelling of the face, lips, or tongue  • angry, confused, depressed, other mood changes  • breathing problems  • feeling faint or lightheaded, falls  • muscle  cramps  • problems with balance, talking, walking  • restlessness  • tremors  • trouble passing urine or change in the amount of urine  • unusually weak or tired  Side effects that usually do not require medical attention (report to your doctor or health care professional if they continue or are bothersome):  • difficulty sleeping, nightmares  • dizziness, drowsiness, clumsiness, or unsteadiness, a hangover effect  • headache  • nausea, vomiting  This list may not describe all possible side effects. Call your doctor for medical advice about side effects. You may report side effects to FDA at 6-919-EVV-7970.

## 2021-10-02 DIAGNOSIS — G89.29 CHRONIC BILATERAL LOW BACK PAIN WITH RIGHT-SIDED SCIATICA: ICD-10-CM

## 2021-10-02 DIAGNOSIS — M54.41 CHRONIC BILATERAL LOW BACK PAIN WITH RIGHT-SIDED SCIATICA: ICD-10-CM

## 2021-10-08 DIAGNOSIS — G89.29 CHRONIC BILATERAL LOW BACK PAIN WITH RIGHT-SIDED SCIATICA: ICD-10-CM

## 2021-10-08 DIAGNOSIS — M54.41 CHRONIC BILATERAL LOW BACK PAIN WITH RIGHT-SIDED SCIATICA: ICD-10-CM

## 2021-10-08 RX ORDER — HYDROCODONE BITARTRATE AND ACETAMINOPHEN 5; 325 MG/1; MG/1
TABLET ORAL
Qty: 30 TABLET | Refills: 0 | Status: SHIPPED | OUTPATIENT
Start: 2021-10-08 | End: 2021-11-10 | Stop reason: SDUPTHER

## 2021-10-15 DIAGNOSIS — G89.29 CHRONIC BILATERAL LOW BACK PAIN WITH RIGHT-SIDED SCIATICA: ICD-10-CM

## 2021-10-15 DIAGNOSIS — M54.41 CHRONIC BILATERAL LOW BACK PAIN WITH RIGHT-SIDED SCIATICA: ICD-10-CM

## 2021-10-26 DIAGNOSIS — G47.00 INSOMNIA, UNSPECIFIED TYPE: ICD-10-CM

## 2021-10-26 RX ORDER — ZOLPIDEM TARTRATE 10 MG/1
10 TABLET ORAL NIGHTLY PRN
Qty: 30 TABLET | Refills: 0 | Status: SHIPPED | OUTPATIENT
Start: 2021-10-26 | End: 2021-11-26 | Stop reason: SDUPTHER

## 2021-11-08 DIAGNOSIS — Z79.890 HORMONE REPLACEMENT THERAPY (HRT): ICD-10-CM

## 2021-11-08 RX ORDER — ESTRADIOL 0.1 MG/D
FILM, EXTENDED RELEASE TRANSDERMAL
Qty: 24 PATCH | Refills: 3 | Status: SHIPPED | OUTPATIENT
Start: 2021-11-08 | End: 2022-03-14

## 2021-11-10 DIAGNOSIS — M54.41 CHRONIC BILATERAL LOW BACK PAIN WITH RIGHT-SIDED SCIATICA: ICD-10-CM

## 2021-11-10 DIAGNOSIS — G89.29 CHRONIC BILATERAL LOW BACK PAIN WITH RIGHT-SIDED SCIATICA: ICD-10-CM

## 2021-11-10 RX ORDER — HYDROCODONE BITARTRATE AND ACETAMINOPHEN 5; 325 MG/1; MG/1
TABLET ORAL
Qty: 30 TABLET | Refills: 0 | Status: SHIPPED | OUTPATIENT
Start: 2021-11-10 | End: 2021-12-01

## 2021-11-19 DIAGNOSIS — B00.1 COLD SORE: ICD-10-CM

## 2021-11-19 RX ORDER — VALACYCLOVIR HYDROCHLORIDE 1 G/1
TABLET, FILM COATED ORAL
Qty: 20 TABLET | Refills: 5 | Status: SHIPPED | OUTPATIENT
Start: 2021-11-19 | End: 2022-05-01 | Stop reason: SDUPTHER

## 2021-11-26 DIAGNOSIS — G47.00 INSOMNIA, UNSPECIFIED TYPE: ICD-10-CM

## 2021-11-29 RX ORDER — ZOLPIDEM TARTRATE 10 MG/1
10 TABLET ORAL NIGHTLY PRN
Qty: 30 TABLET | Refills: 0 | Status: SHIPPED | OUTPATIENT
Start: 2021-11-29 | End: 2021-12-27 | Stop reason: SDUPTHER

## 2021-12-01 ENCOUNTER — TELEPHONE (OUTPATIENT)
Dept: MEDICAL GROUP | Facility: PHYSICIAN GROUP | Age: 66
End: 2021-12-01

## 2021-12-01 DIAGNOSIS — G89.29 CHRONIC BILATERAL LOW BACK PAIN WITH RIGHT-SIDED SCIATICA: ICD-10-CM

## 2021-12-01 DIAGNOSIS — M54.41 CHRONIC BILATERAL LOW BACK PAIN WITH RIGHT-SIDED SCIATICA: ICD-10-CM

## 2021-12-01 NOTE — TELEPHONE ENCOUNTER
[id number] - [place], [place] - [address] [facility] [address]  [address] [address]  Phone: [phone] Fax: [phone]    Pharmacy states they do not have Vicodin 5/300mg in stock.  States in past he has had 5/325mg and is asking if ok to change.

## 2021-12-02 NOTE — TELEPHONE ENCOUNTER
Pharmacy cannot get any name brand Vicodin.  Called pt and she is asking for what she has gotten in the past.

## 2021-12-03 DIAGNOSIS — G89.29 CHRONIC BILATERAL LOW BACK PAIN WITH RIGHT-SIDED SCIATICA: ICD-10-CM

## 2021-12-03 DIAGNOSIS — M54.41 CHRONIC BILATERAL LOW BACK PAIN WITH RIGHT-SIDED SCIATICA: ICD-10-CM

## 2021-12-03 RX ORDER — HYDROCODONE BITARTRATE AND ACETAMINOPHEN 5; 325 MG/1; MG/1
1 TABLET ORAL EVERY 8 HOURS PRN
Qty: 30 TABLET | Refills: 0 | Status: SHIPPED | OUTPATIENT
Start: 2021-12-03 | End: 2021-12-28 | Stop reason: SDUPTHER

## 2021-12-27 DIAGNOSIS — G47.00 INSOMNIA, UNSPECIFIED TYPE: ICD-10-CM

## 2021-12-27 RX ORDER — ZOLPIDEM TARTRATE 10 MG/1
10 TABLET ORAL NIGHTLY PRN
Qty: 30 TABLET | Refills: 0 | Status: SHIPPED | OUTPATIENT
Start: 2021-12-27 | End: 2022-01-25 | Stop reason: SDUPTHER

## 2021-12-28 DIAGNOSIS — M54.41 CHRONIC BILATERAL LOW BACK PAIN WITH RIGHT-SIDED SCIATICA: ICD-10-CM

## 2021-12-28 DIAGNOSIS — G89.29 CHRONIC BILATERAL LOW BACK PAIN WITH RIGHT-SIDED SCIATICA: ICD-10-CM

## 2021-12-28 RX ORDER — HYDROCODONE BITARTRATE AND ACETAMINOPHEN 5; 325 MG/1; MG/1
1 TABLET ORAL EVERY 8 HOURS PRN
Qty: 30 TABLET | Refills: 0 | Status: SHIPPED | OUTPATIENT
Start: 2021-12-28 | End: 2022-01-12

## 2022-01-25 DIAGNOSIS — G47.00 INSOMNIA, UNSPECIFIED TYPE: ICD-10-CM

## 2022-01-25 RX ORDER — ZOLPIDEM TARTRATE 10 MG/1
10 TABLET ORAL NIGHTLY PRN
Qty: 30 TABLET | Refills: 0 | Status: SHIPPED | OUTPATIENT
Start: 2022-01-25 | End: 2022-02-25 | Stop reason: SDUPTHER

## 2022-01-28 DIAGNOSIS — M54.41 CHRONIC BILATERAL LOW BACK PAIN WITH RIGHT-SIDED SCIATICA: ICD-10-CM

## 2022-01-28 DIAGNOSIS — G89.29 CHRONIC BILATERAL LOW BACK PAIN WITH RIGHT-SIDED SCIATICA: ICD-10-CM

## 2022-01-28 RX ORDER — HYDROCODONE BITARTRATE AND ACETAMINOPHEN 5; 325 MG/1; MG/1
1 TABLET ORAL EVERY 8 HOURS PRN
Qty: 30 TABLET | Refills: 0 | Status: SHIPPED | OUTPATIENT
Start: 2022-01-28 | End: 2022-02-25 | Stop reason: SDUPTHER

## 2022-02-25 DIAGNOSIS — G47.00 INSOMNIA, UNSPECIFIED TYPE: ICD-10-CM

## 2022-02-25 DIAGNOSIS — G89.29 CHRONIC BILATERAL LOW BACK PAIN WITH RIGHT-SIDED SCIATICA: ICD-10-CM

## 2022-02-25 DIAGNOSIS — M54.41 CHRONIC BILATERAL LOW BACK PAIN WITH RIGHT-SIDED SCIATICA: ICD-10-CM

## 2022-02-25 RX ORDER — ZOLPIDEM TARTRATE 10 MG/1
10 TABLET ORAL NIGHTLY PRN
Qty: 30 TABLET | Refills: 0 | Status: SHIPPED | OUTPATIENT
Start: 2022-02-25 | End: 2022-03-27 | Stop reason: SDUPTHER

## 2022-02-25 RX ORDER — HYDROCODONE BITARTRATE AND ACETAMINOPHEN 5; 325 MG/1; MG/1
1 TABLET ORAL EVERY 8 HOURS PRN
Qty: 30 TABLET | Refills: 0 | Status: SHIPPED | OUTPATIENT
Start: 2022-02-25 | End: 2022-03-27 | Stop reason: SDUPTHER

## 2022-03-14 DIAGNOSIS — Z79.890 HORMONE REPLACEMENT THERAPY (HRT): ICD-10-CM

## 2022-03-14 RX ORDER — ESTRADIOL 0.1 MG/D
FILM, EXTENDED RELEASE TRANSDERMAL
Qty: 24 PATCH | Refills: 1 | Status: SHIPPED | OUTPATIENT
Start: 2022-03-14 | End: 2022-08-15

## 2022-03-27 DIAGNOSIS — G89.29 CHRONIC BILATERAL LOW BACK PAIN WITH RIGHT-SIDED SCIATICA: ICD-10-CM

## 2022-03-27 DIAGNOSIS — M54.41 CHRONIC BILATERAL LOW BACK PAIN WITH RIGHT-SIDED SCIATICA: ICD-10-CM

## 2022-03-27 DIAGNOSIS — G47.00 INSOMNIA, UNSPECIFIED TYPE: ICD-10-CM

## 2022-03-28 RX ORDER — HYDROCODONE BITARTRATE AND ACETAMINOPHEN 5; 325 MG/1; MG/1
1 TABLET ORAL EVERY 8 HOURS PRN
Qty: 30 TABLET | Refills: 0 | Status: SHIPPED | OUTPATIENT
Start: 2022-03-28 | End: 2022-04-26 | Stop reason: SDUPTHER

## 2022-03-28 RX ORDER — ZOLPIDEM TARTRATE 10 MG/1
10 TABLET ORAL NIGHTLY PRN
Qty: 30 TABLET | Refills: 0 | Status: SHIPPED | OUTPATIENT
Start: 2022-03-28 | End: 2022-04-26 | Stop reason: SDUPTHER

## 2022-04-26 DIAGNOSIS — M54.41 CHRONIC BILATERAL LOW BACK PAIN WITH RIGHT-SIDED SCIATICA: ICD-10-CM

## 2022-04-26 DIAGNOSIS — G47.00 INSOMNIA, UNSPECIFIED TYPE: ICD-10-CM

## 2022-04-26 DIAGNOSIS — G89.29 CHRONIC BILATERAL LOW BACK PAIN WITH RIGHT-SIDED SCIATICA: ICD-10-CM

## 2022-04-26 RX ORDER — HYDROCODONE BITARTRATE AND ACETAMINOPHEN 5; 325 MG/1; MG/1
1 TABLET ORAL EVERY 8 HOURS PRN
Qty: 30 TABLET | Refills: 0 | Status: SHIPPED | OUTPATIENT
Start: 2022-04-26 | End: 2022-05-23 | Stop reason: SDUPTHER

## 2022-04-26 RX ORDER — ZOLPIDEM TARTRATE 10 MG/1
10 TABLET ORAL NIGHTLY PRN
Qty: 30 TABLET | Refills: 0 | Status: SHIPPED | OUTPATIENT
Start: 2022-04-26 | End: 2022-05-25 | Stop reason: SDUPTHER

## 2022-04-29 ENCOUNTER — HOSPITAL ENCOUNTER (OUTPATIENT)
Dept: LAB | Facility: MEDICAL CENTER | Age: 67
End: 2022-04-29
Attending: FAMILY MEDICINE
Payer: COMMERCIAL

## 2022-04-29 DIAGNOSIS — Z76.89 ENCOUNTER TO ESTABLISH CARE WITH NEW DOCTOR: ICD-10-CM

## 2022-04-29 DIAGNOSIS — R53.82 CHRONIC FATIGUE: ICD-10-CM

## 2022-04-29 LAB
25(OH)D3 SERPL-MCNC: 39 NG/ML (ref 30–100)
APPEARANCE UR: CLEAR
BACTERIA #/AREA URNS HPF: NEGATIVE /HPF
BILIRUB UR QL STRIP.AUTO: NEGATIVE
CHOLEST SERPL-MCNC: 198 MG/DL (ref 100–199)
COLOR UR: YELLOW
EPI CELLS #/AREA URNS HPF: NEGATIVE /HPF
FASTING STATUS PATIENT QL REPORTED: NORMAL
GLUCOSE UR STRIP.AUTO-MCNC: NEGATIVE MG/DL
HDLC SERPL-MCNC: 56 MG/DL
HYALINE CASTS #/AREA URNS LPF: NORMAL /LPF
KETONES UR STRIP.AUTO-MCNC: 15 MG/DL
LDLC SERPL CALC-MCNC: 130 MG/DL
LEUKOCYTE ESTERASE UR QL STRIP.AUTO: NEGATIVE
MICRO URNS: ABNORMAL
NITRITE UR QL STRIP.AUTO: NEGATIVE
PH UR STRIP.AUTO: 6 [PH] (ref 5–8)
PROT UR QL STRIP: NEGATIVE MG/DL
RBC # URNS HPF: NORMAL /HPF
RBC UR QL AUTO: ABNORMAL
SP GR UR STRIP.AUTO: 1.01
TRIGL SERPL-MCNC: 58 MG/DL (ref 0–149)
TSH SERPL DL<=0.005 MIU/L-ACNC: 1.42 UIU/ML (ref 0.38–5.33)
UROBILINOGEN UR STRIP.AUTO-MCNC: 0.2 MG/DL
VIT B12 SERPL-MCNC: 507 PG/ML (ref 211–911)
WBC #/AREA URNS HPF: NORMAL /HPF

## 2022-04-29 PROCEDURE — 80061 LIPID PANEL: CPT

## 2022-04-29 PROCEDURE — 36415 COLL VENOUS BLD VENIPUNCTURE: CPT

## 2022-04-29 PROCEDURE — 81001 URINALYSIS AUTO W/SCOPE: CPT

## 2022-04-29 PROCEDURE — 82607 VITAMIN B-12: CPT

## 2022-04-29 PROCEDURE — 82306 VITAMIN D 25 HYDROXY: CPT

## 2022-04-29 PROCEDURE — 84443 ASSAY THYROID STIM HORMONE: CPT

## 2022-05-01 DIAGNOSIS — B00.1 COLD SORE: ICD-10-CM

## 2022-05-02 RX ORDER — VALACYCLOVIR HYDROCHLORIDE 1 G/1
TABLET, FILM COATED ORAL
Qty: 20 TABLET | Refills: 5 | Status: SHIPPED | OUTPATIENT
Start: 2022-05-02 | End: 2022-12-16 | Stop reason: SDUPTHER

## 2022-05-05 ENCOUNTER — HOSPITAL ENCOUNTER (OUTPATIENT)
Dept: RADIOLOGY | Facility: MEDICAL CENTER | Age: 67
End: 2022-05-05
Attending: FAMILY MEDICINE
Payer: COMMERCIAL

## 2022-05-05 DIAGNOSIS — Z12.31 VISIT FOR SCREENING MAMMOGRAM: ICD-10-CM

## 2022-05-05 PROCEDURE — 77063 BREAST TOMOSYNTHESIS BI: CPT

## 2022-05-11 SDOH — ECONOMIC STABILITY: TRANSPORTATION INSECURITY
IN THE PAST 12 MONTHS, HAS THE LACK OF TRANSPORTATION KEPT YOU FROM MEDICAL APPOINTMENTS OR FROM GETTING MEDICATIONS?: NO

## 2022-05-11 SDOH — ECONOMIC STABILITY: TRANSPORTATION INSECURITY
IN THE PAST 12 MONTHS, HAS LACK OF TRANSPORTATION KEPT YOU FROM MEETINGS, WORK, OR FROM GETTING THINGS NEEDED FOR DAILY LIVING?: NO

## 2022-05-11 SDOH — ECONOMIC STABILITY: TRANSPORTATION INSECURITY
IN THE PAST 12 MONTHS, HAS LACK OF RELIABLE TRANSPORTATION KEPT YOU FROM MEDICAL APPOINTMENTS, MEETINGS, WORK OR FROM GETTING THINGS NEEDED FOR DAILY LIVING?: NO

## 2022-05-11 SDOH — ECONOMIC STABILITY: INCOME INSECURITY: HOW HARD IS IT FOR YOU TO PAY FOR THE VERY BASICS LIKE FOOD, HOUSING, MEDICAL CARE, AND HEATING?: SOMEWHAT HARD

## 2022-05-11 SDOH — HEALTH STABILITY: PHYSICAL HEALTH: ON AVERAGE, HOW MANY MINUTES DO YOU ENGAGE IN EXERCISE AT THIS LEVEL?: 10 MIN

## 2022-05-11 SDOH — ECONOMIC STABILITY: INCOME INSECURITY: IN THE LAST 12 MONTHS, WAS THERE A TIME WHEN YOU WERE NOT ABLE TO PAY THE MORTGAGE OR RENT ON TIME?: NO

## 2022-05-11 SDOH — ECONOMIC STABILITY: HOUSING INSECURITY
IN THE LAST 12 MONTHS, WAS THERE A TIME WHEN YOU DID NOT HAVE A STEADY PLACE TO SLEEP OR SLEPT IN A SHELTER (INCLUDING NOW)?: NO

## 2022-05-11 SDOH — ECONOMIC STABILITY: FOOD INSECURITY: WITHIN THE PAST 12 MONTHS, THE FOOD YOU BOUGHT JUST DIDN'T LAST AND YOU DIDN'T HAVE MONEY TO GET MORE.: NEVER TRUE

## 2022-05-11 SDOH — HEALTH STABILITY: PHYSICAL HEALTH: ON AVERAGE, HOW MANY DAYS PER WEEK DO YOU ENGAGE IN MODERATE TO STRENUOUS EXERCISE (LIKE A BRISK WALK)?: 0 DAYS

## 2022-05-11 SDOH — ECONOMIC STABILITY: FOOD INSECURITY: WITHIN THE PAST 12 MONTHS, YOU WORRIED THAT YOUR FOOD WOULD RUN OUT BEFORE YOU GOT MONEY TO BUY MORE.: SOMETIMES TRUE

## 2022-05-11 SDOH — ECONOMIC STABILITY: HOUSING INSECURITY: IN THE LAST 12 MONTHS, HOW MANY PLACES HAVE YOU LIVED?: 1

## 2022-05-11 SDOH — HEALTH STABILITY: MENTAL HEALTH
STRESS IS WHEN SOMEONE FEELS TENSE, NERVOUS, ANXIOUS, OR CAN'T SLEEP AT NIGHT BECAUSE THEIR MIND IS TROUBLED. HOW STRESSED ARE YOU?: TO SOME EXTENT

## 2022-05-11 ASSESSMENT — SOCIAL DETERMINANTS OF HEALTH (SDOH)
HOW MANY DRINKS CONTAINING ALCOHOL DO YOU HAVE ON A TYPICAL DAY WHEN YOU ARE DRINKING: 1 OR 2
HOW OFTEN DO YOU HAVE SIX OR MORE DRINKS ON ONE OCCASION: NEVER
HOW OFTEN DO YOU ATTEND CHURCH OR RELIGIOUS SERVICES?: NEVER
HOW OFTEN DO YOU HAVE A DRINK CONTAINING ALCOHOL: MONTHLY OR LESS
IN A TYPICAL WEEK, HOW MANY TIMES DO YOU TALK ON THE PHONE WITH FAMILY, FRIENDS, OR NEIGHBORS?: TWICE A WEEK
IN A TYPICAL WEEK, HOW MANY TIMES DO YOU TALK ON THE PHONE WITH FAMILY, FRIENDS, OR NEIGHBORS?: TWICE A WEEK
HOW OFTEN DO YOU GET TOGETHER WITH FRIENDS OR RELATIVES?: ONCE A WEEK
HOW HARD IS IT FOR YOU TO PAY FOR THE VERY BASICS LIKE FOOD, HOUSING, MEDICAL CARE, AND HEATING?: SOMEWHAT HARD
HOW OFTEN DO YOU ATTENT MEETINGS OF THE CLUB OR ORGANIZATION YOU BELONG TO?: NEVER
HOW OFTEN DO YOU ATTENT MEETINGS OF THE CLUB OR ORGANIZATION YOU BELONG TO?: NEVER
DO YOU BELONG TO ANY CLUBS OR ORGANIZATIONS SUCH AS CHURCH GROUPS UNIONS, FRATERNAL OR ATHLETIC GROUPS, OR SCHOOL GROUPS?: YES
WITHIN THE PAST 12 MONTHS, YOU WORRIED THAT YOUR FOOD WOULD RUN OUT BEFORE YOU GOT THE MONEY TO BUY MORE: SOMETIMES TRUE
DO YOU BELONG TO ANY CLUBS OR ORGANIZATIONS SUCH AS CHURCH GROUPS UNIONS, FRATERNAL OR ATHLETIC GROUPS, OR SCHOOL GROUPS?: YES
HOW OFTEN DO YOU ATTEND CHURCH OR RELIGIOUS SERVICES?: NEVER
HOW OFTEN DO YOU GET TOGETHER WITH FRIENDS OR RELATIVES?: ONCE A WEEK

## 2022-05-11 ASSESSMENT — LIFESTYLE VARIABLES
SKIP TO QUESTIONS 9-10: 1
HOW OFTEN DO YOU HAVE SIX OR MORE DRINKS ON ONE OCCASION: NEVER
AUDIT-C TOTAL SCORE: 1
HOW OFTEN DO YOU HAVE A DRINK CONTAINING ALCOHOL: MONTHLY OR LESS
HOW MANY STANDARD DRINKS CONTAINING ALCOHOL DO YOU HAVE ON A TYPICAL DAY: 1 OR 2

## 2022-05-12 ENCOUNTER — OFFICE VISIT (OUTPATIENT)
Dept: MEDICAL GROUP | Facility: PHYSICIAN GROUP | Age: 67
End: 2022-05-12
Payer: COMMERCIAL

## 2022-05-12 VITALS
SYSTOLIC BLOOD PRESSURE: 140 MMHG | BODY MASS INDEX: 30.29 KG/M2 | OXYGEN SATURATION: 96 % | RESPIRATION RATE: 16 BRPM | HEART RATE: 80 BPM | WEIGHT: 188.5 LBS | TEMPERATURE: 97.8 F | DIASTOLIC BLOOD PRESSURE: 84 MMHG | HEIGHT: 66 IN

## 2022-05-12 DIAGNOSIS — G89.29 CHRONIC BILATERAL LOW BACK PAIN WITH RIGHT-SIDED SCIATICA: ICD-10-CM

## 2022-05-12 DIAGNOSIS — R73.03 PREDIABETES: ICD-10-CM

## 2022-05-12 DIAGNOSIS — M54.2 CERVICALGIA: ICD-10-CM

## 2022-05-12 DIAGNOSIS — M54.41 CHRONIC BILATERAL LOW BACK PAIN WITH RIGHT-SIDED SCIATICA: ICD-10-CM

## 2022-05-12 DIAGNOSIS — E66.9 OBESITY (BMI 30.0-34.9): ICD-10-CM

## 2022-05-12 DIAGNOSIS — Z20.820 EXPOSURE TO CHICKENPOX: ICD-10-CM

## 2022-05-12 DIAGNOSIS — Z00.00 WELLNESS EXAMINATION: ICD-10-CM

## 2022-05-12 PROBLEM — R07.89 ACUTE CHEST WALL PAIN: Status: RESOLVED | Noted: 2021-06-25 | Resolved: 2022-05-12

## 2022-05-12 PROBLEM — B00.1 COLD SORE: Status: RESOLVED | Noted: 2019-04-29 | Resolved: 2022-05-12

## 2022-05-12 PROBLEM — E66.811 OBESITY (BMI 30.0-34.9): Status: ACTIVE | Noted: 2022-05-12

## 2022-05-12 PROCEDURE — 99397 PER PM REEVAL EST PAT 65+ YR: CPT | Performed by: FAMILY MEDICINE

## 2022-05-12 ASSESSMENT — FIBROSIS 4 INDEX: FIB4 SCORE: 1.64

## 2022-05-12 ASSESSMENT — PATIENT HEALTH QUESTIONNAIRE - PHQ9: CLINICAL INTERPRETATION OF PHQ2 SCORE: 0

## 2022-05-12 NOTE — ASSESSMENT & PLAN NOTE
Patient is here today for wellness exam.  She is having some occasional issues with pain on the right side of her neck that can go down her right arm.  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  No history of neck injury.  Also here to go over lab work.  Her comp panel was not ordered so that will be reordered for her.

## 2022-05-12 NOTE — PROGRESS NOTES
Subjective:     CC: Here for annual visit.    HPI:   Lurdes presents today with the following medical concerns:    Wellness examination  Patient is here today for wellness exam.  She is having some occasional issues with pain on the right side of her neck that can go down her right arm.  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  No history of neck injury.  Also here to go over lab work.  Her comp panel was not ordered so that will be reordered for her.    Cervicalgia  This is a new problem.  She has been having periodic episodes of neck pain that can go down the right arm.  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  She has not had x-rays or MRI of this in the past.  She has known issues with her lower back that required surgery in the past.    Chronic bilateral low back pain with right-sided sciatica  This is a chronic problem.  Patient did have an MRI last year that showed no new issues with her back and nothing that appeared to need surgical intervention.  She does continue to have low back pain and is on chronic medications for this.    Obesity (BMI 30.0-34.9)  This is a chronic problem.  Patient does try to watch her weight and work on that.      Past Medical History:   Diagnosis Date   • Back pain    • Insomnia        Social History     Tobacco Use   • Smoking status: Former Smoker     Packs/day: 1.00     Years: 15.00     Pack years: 15.00     Types: Cigarettes     Quit date: 10/26/1996     Years since quittin.5   • Smokeless tobacco: Never Used   Vaping Use   • Vaping Use: Never used   Substance Use Topics   • Alcohol use: Yes     Comment: 4 times yearly   • Drug use: No       Current Outpatient Medications Ordered in Epic   Medication Sig Dispense Refill   • valacyclovir (VALTREX) 1 GM Tab 2 po bid for 1 day prn outbreak 20 Tablet 5   • zolpidem (AMBIEN) 10 MG Tab Take 1 Tablet by mouth at bedtime  "as needed for Sleep for up to 30 days. 30 Tablet 0   • HYDROcodone-acetaminophen (NORCO) 5-325 MG Tab per tablet Take 1 Tablet by mouth every 8 hours as needed (severe pain) for up to 30 days. 30 Tablet 0   • estradiol (VIVELLE DOT) 0.1 MG/24HR PATCH BIWEEKLY APPLY 1 PATCH TOPICALLY TO THE SKIN 2 TIMES A WEEK AS DIRECTED 24 Patch 1   • methocarbamol (ROBAXIN) 750 MG Tab Take 1 Tablet by mouth 3 times a day as needed (muscle spasm). 90 Tablet 5   • meloxicam (MOBIC) 15 MG tablet Take one  tablet with breakfast as needed for pain. No advil/aleve/motrin/ibuprofen on same day. 90 Tablet 5   • Cholecalciferol (VITAMIN D PO) Take  by mouth.     • Multiple Vitamin (MULTI-VITAMINS) Tab Take 1 tablet by mouth.       No current Epic-ordered facility-administered medications on file.       Allergies:  Pcn [penicillins], Sulfa drugs, and Clindamycin    Health Maintenance: Completed    ROS:  Gen: no fevers/chills, no changes in weight  Eyes: no changes in vision  ENT: no sore throat, no hearing loss, no bloody nose  Pulm: no sob, no cough  CV: no chest pain, no palpitations  GI: no nausea/vomiting, no diarrhea  : no dysuria  MSk: no myalgias  Skin: no rash  Neuro: no headaches, no numbness/tingling  Heme/Lymph: no easy bruising      Objective:       Exam:  BP (!) 140/84 (BP Location: Right arm, Patient Position: Sitting, BP Cuff Size: Adult)   Pulse 80   Temp 36.6 °C (97.8 °F) (Temporal)   Resp 16   Ht 1.676 m (5' 6\")   Wt 85.5 kg (188 lb 8 oz)   SpO2 96%   Breastfeeding No   BMI 30.42 kg/m²  Body mass index is 30.42 kg/m².    Gen: Alert and oriented, No apparent distress.  Eyes:   Extraocular motions intact.  No scleral icterus seen.  Ears:    Ear canals and TMs are normal.  Neck: Neck is supple without lymphadenopathy.  Thyroid exam is normal.  Lungs: Normal effort, CTA bilaterally, no wheezes, rhonchi, or rales  CV: Regular rate and rhythm. No murmurs, rubs, or gallops.  No carotid bruits heard.  Abdomen: Soft, " nontender, no organomegaly or masses and normal bowel sounds.  Ext: No clubbing, cyanosis, edema.  Neuro: Cranial nerves II through VIII are grossly intact.  No lateralized signs are seen.  Gait is normal.  Psych: Patient is alert and oriented x3.  No unusual topics expressed.  Insight and judgment is good.        Labs: Reviewed with patient.    Assessment & Plan:     67 y.o. female with the following -     1. Prediabetes  This is a chronic problem.  Her comp panel was ordered and she will do that in the near future.  - Comp Metabolic Panel; Future    2. Exposure to chickenpox  This is a chronic issue.  Patient did not do her labs last year so we will reorder it.  If it is positive then she wants to get the shingles vaccine.  - VARICELLA ZOSTER IGG AB; Future    3. Chronic bilateral low back pain with right-sided sciatica  This is a chronic problem.  I discussed with her she might benefit from injections and we could refer her for that when she is ready.  She wants to think about it.  In the meantime continue her mild exercise program.    4. Cervicalgia  This is a new issue.  Patient was told if she has recurrences of her pain we can get a x-ray and MRI of her neck.    5. Obesity (BMI 30.0-34.9)  This is a chronic problem.  Continue to work on weight reduction.  - Patient identified as having weight management issue.  Appropriate orders and counseling given.    6. Wellness examination  Patient's wellness exam is otherwise normal.  We did talk about the pneumonia vaccine and she wants to wait and perhaps come in and get that next week.      Return in about 1 year (around 5/12/2023) for Long.    Please note that this dictation was created using voice recognition software. I have made every reasonable attempt to correct obvious errors, but I expect that there are errors of grammar and possibly content that I did not discover before finalizing the note.

## 2022-05-12 NOTE — ASSESSMENT & PLAN NOTE
This is a new problem.  She has been having periodic episodes of neck pain that can go down the right arm.  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  She has not had x-rays or MRI of this in the past.  She has known issues with her lower back that required surgery in the past.

## 2022-05-12 NOTE — ASSESSMENT & PLAN NOTE
This is a chronic problem.  Patient did have an MRI last year that showed no new issues with her back and nothing that appeared to need surgical intervention.  She does continue to have low back pain and is on chronic medications for this.

## 2022-05-23 DIAGNOSIS — G89.29 CHRONIC BILATERAL LOW BACK PAIN WITH RIGHT-SIDED SCIATICA: ICD-10-CM

## 2022-05-23 DIAGNOSIS — M54.41 CHRONIC BILATERAL LOW BACK PAIN WITH RIGHT-SIDED SCIATICA: ICD-10-CM

## 2022-05-23 RX ORDER — HYDROCODONE BITARTRATE AND ACETAMINOPHEN 5; 325 MG/1; MG/1
1 TABLET ORAL EVERY 8 HOURS PRN
Qty: 30 TABLET | Refills: 0 | Status: SHIPPED | OUTPATIENT
Start: 2022-05-23 | End: 2022-06-21 | Stop reason: SDUPTHER

## 2022-05-25 DIAGNOSIS — G47.00 INSOMNIA, UNSPECIFIED TYPE: ICD-10-CM

## 2022-05-26 RX ORDER — ZOLPIDEM TARTRATE 10 MG/1
10 TABLET ORAL NIGHTLY PRN
Qty: 30 TABLET | Refills: 0 | Status: SHIPPED | OUTPATIENT
Start: 2022-05-26 | End: 2022-06-23 | Stop reason: SDUPTHER

## 2022-06-02 ENCOUNTER — NON-PROVIDER VISIT (OUTPATIENT)
Dept: MEDICAL GROUP | Facility: PHYSICIAN GROUP | Age: 67
End: 2022-06-02
Payer: COMMERCIAL

## 2022-06-02 DIAGNOSIS — Z23 NEED FOR VACCINATION: ICD-10-CM

## 2022-06-02 PROCEDURE — 90715 TDAP VACCINE 7 YRS/> IM: CPT | Performed by: FAMILY MEDICINE

## 2022-06-02 PROCEDURE — 90471 IMMUNIZATION ADMIN: CPT | Performed by: FAMILY MEDICINE

## 2022-06-02 NOTE — PROGRESS NOTES
"Lurdes Ramon is a 67 y.o. female here for a non-provider visit for:   TDAP    Reason for immunization: Overdue/Provider Recommended  Immunization records indicate need for vaccine: Yes, confirmed with Epic  Minimum interval has been met for this vaccine: Yes  ABN completed: Yes    VIS Dated  08/06/2016 was given to patient: Yes  All IAC Questionnaire questions were answered \"No.\"    Patient tolerated injection and no adverse effects were observed or reported: Yes    Pt scheduled for next dose in series: Not Indicated  "

## 2022-06-14 ENCOUNTER — HOSPITAL ENCOUNTER (OUTPATIENT)
Dept: LAB | Facility: MEDICAL CENTER | Age: 67
End: 2022-06-14
Attending: FAMILY MEDICINE
Payer: COMMERCIAL

## 2022-06-14 DIAGNOSIS — R73.03 PREDIABETES: ICD-10-CM

## 2022-06-14 DIAGNOSIS — Z20.820 EXPOSURE TO CHICKENPOX: ICD-10-CM

## 2022-06-14 LAB
ALBUMIN SERPL BCP-MCNC: 4.4 G/DL (ref 3.2–4.9)
ALBUMIN/GLOB SERPL: 1.5 G/DL
ALP SERPL-CCNC: 82 U/L (ref 30–99)
ALT SERPL-CCNC: 15 U/L (ref 2–50)
ANION GAP SERPL CALC-SCNC: 11 MMOL/L (ref 7–16)
AST SERPL-CCNC: 15 U/L (ref 12–45)
BILIRUB SERPL-MCNC: 0.5 MG/DL (ref 0.1–1.5)
BUN SERPL-MCNC: 13 MG/DL (ref 8–22)
CALCIUM SERPL-MCNC: 9.4 MG/DL (ref 8.5–10.5)
CHLORIDE SERPL-SCNC: 103 MMOL/L (ref 96–112)
CO2 SERPL-SCNC: 24 MMOL/L (ref 20–33)
CREAT SERPL-MCNC: 0.58 MG/DL (ref 0.5–1.4)
FASTING STATUS PATIENT QL REPORTED: NORMAL
GFR SERPLBLD CREATININE-BSD FMLA CKD-EPI: 99 ML/MIN/1.73 M 2
GLOBULIN SER CALC-MCNC: 3 G/DL (ref 1.9–3.5)
GLUCOSE SERPL-MCNC: 101 MG/DL (ref 65–99)
POTASSIUM SERPL-SCNC: 4.1 MMOL/L (ref 3.6–5.5)
PROT SERPL-MCNC: 7.4 G/DL (ref 6–8.2)
SODIUM SERPL-SCNC: 138 MMOL/L (ref 135–145)

## 2022-06-14 PROCEDURE — 36415 COLL VENOUS BLD VENIPUNCTURE: CPT

## 2022-06-14 PROCEDURE — 86787 VARICELLA-ZOSTER ANTIBODY: CPT

## 2022-06-14 PROCEDURE — 80053 COMPREHEN METABOLIC PANEL: CPT

## 2022-06-17 LAB — VZV IGG SER IA-ACNC: 285.1 IV

## 2022-06-21 DIAGNOSIS — M54.41 CHRONIC BILATERAL LOW BACK PAIN WITH RIGHT-SIDED SCIATICA: ICD-10-CM

## 2022-06-21 DIAGNOSIS — G89.29 CHRONIC BILATERAL LOW BACK PAIN WITH RIGHT-SIDED SCIATICA: ICD-10-CM

## 2022-06-21 RX ORDER — HYDROCODONE BITARTRATE AND ACETAMINOPHEN 5; 325 MG/1; MG/1
1 TABLET ORAL EVERY 8 HOURS PRN
Qty: 30 TABLET | Refills: 0 | Status: SHIPPED | OUTPATIENT
Start: 2022-06-21 | End: 2022-07-18 | Stop reason: SDUPTHER

## 2022-06-23 DIAGNOSIS — G47.00 INSOMNIA, UNSPECIFIED TYPE: ICD-10-CM

## 2022-06-24 RX ORDER — ZOLPIDEM TARTRATE 10 MG/1
10 TABLET ORAL NIGHTLY PRN
Qty: 30 TABLET | Refills: 0 | Status: SHIPPED | OUTPATIENT
Start: 2022-06-24 | End: 2022-07-25 | Stop reason: SDUPTHER

## 2022-07-18 DIAGNOSIS — M54.41 CHRONIC BILATERAL LOW BACK PAIN WITH RIGHT-SIDED SCIATICA: ICD-10-CM

## 2022-07-18 DIAGNOSIS — G89.29 CHRONIC BILATERAL LOW BACK PAIN WITH RIGHT-SIDED SCIATICA: ICD-10-CM

## 2022-07-18 RX ORDER — HYDROCODONE BITARTRATE AND ACETAMINOPHEN 5; 325 MG/1; MG/1
1 TABLET ORAL EVERY 8 HOURS PRN
Qty: 30 TABLET | Refills: 0 | Status: SHIPPED | OUTPATIENT
Start: 2022-07-18 | End: 2022-08-15 | Stop reason: SDUPTHER

## 2022-07-25 DIAGNOSIS — G47.00 INSOMNIA, UNSPECIFIED TYPE: ICD-10-CM

## 2022-07-26 RX ORDER — ZOLPIDEM TARTRATE 10 MG/1
10 TABLET ORAL NIGHTLY PRN
Qty: 30 TABLET | Refills: 0 | Status: SHIPPED | OUTPATIENT
Start: 2022-07-26 | End: 2022-08-24

## 2022-08-14 DIAGNOSIS — Z79.890 HORMONE REPLACEMENT THERAPY (HRT): ICD-10-CM

## 2022-08-15 DIAGNOSIS — M54.41 CHRONIC BILATERAL LOW BACK PAIN WITH RIGHT-SIDED SCIATICA: ICD-10-CM

## 2022-08-15 DIAGNOSIS — G89.29 CHRONIC BILATERAL LOW BACK PAIN WITH RIGHT-SIDED SCIATICA: ICD-10-CM

## 2022-08-15 RX ORDER — ESTRADIOL 0.1 MG/D
FILM, EXTENDED RELEASE TRANSDERMAL
Qty: 24 PATCH | Refills: 1 | Status: SHIPPED | OUTPATIENT
Start: 2022-08-15 | End: 2022-12-29

## 2022-08-16 RX ORDER — HYDROCODONE BITARTRATE AND ACETAMINOPHEN 5; 325 MG/1; MG/1
1 TABLET ORAL EVERY 8 HOURS PRN
Qty: 30 TABLET | Refills: 0 | Status: SHIPPED | OUTPATIENT
Start: 2022-08-16 | End: 2022-09-12 | Stop reason: SDUPTHER

## 2022-08-24 DIAGNOSIS — G47.00 INSOMNIA, UNSPECIFIED TYPE: ICD-10-CM

## 2022-08-24 RX ORDER — ZOLPIDEM TARTRATE 10 MG/1
TABLET ORAL
Qty: 30 TABLET | Refills: 0 | Status: SHIPPED | OUTPATIENT
Start: 2022-08-24 | End: 2022-09-21 | Stop reason: SDUPTHER

## 2022-09-11 ENCOUNTER — HOSPITAL ENCOUNTER (OUTPATIENT)
Facility: MEDICAL CENTER | Age: 67
End: 2022-09-11
Attending: PHYSICIAN ASSISTANT
Payer: COMMERCIAL

## 2022-09-11 ENCOUNTER — OFFICE VISIT (OUTPATIENT)
Dept: URGENT CARE | Facility: PHYSICIAN GROUP | Age: 67
End: 2022-09-11
Payer: COMMERCIAL

## 2022-09-11 VITALS
OXYGEN SATURATION: 96 % | HEART RATE: 76 BPM | TEMPERATURE: 97.3 F | BODY MASS INDEX: 28.12 KG/M2 | SYSTOLIC BLOOD PRESSURE: 128 MMHG | RESPIRATION RATE: 16 BRPM | HEIGHT: 66 IN | WEIGHT: 175 LBS | DIASTOLIC BLOOD PRESSURE: 72 MMHG

## 2022-09-11 DIAGNOSIS — N30.01 ACUTE CYSTITIS WITH HEMATURIA: ICD-10-CM

## 2022-09-11 DIAGNOSIS — R35.0 URINARY FREQUENCY: ICD-10-CM

## 2022-09-11 LAB
APPEARANCE UR: NORMAL
BILIRUB UR STRIP-MCNC: NEGATIVE MG/DL
COLOR UR AUTO: YELLOW
GLUCOSE UR STRIP.AUTO-MCNC: NEGATIVE MG/DL
KETONES UR STRIP.AUTO-MCNC: NEGATIVE MG/DL
LEUKOCYTE ESTERASE UR QL STRIP.AUTO: NEGATIVE
NITRITE UR QL STRIP.AUTO: NEGATIVE
PH UR STRIP.AUTO: NEGATIVE [PH] (ref 5–8)
PROT UR QL STRIP: NEGATIVE MG/DL
RBC UR QL AUTO: NORMAL
SP GR UR STRIP.AUTO: 1.01
UROBILINOGEN UR STRIP-MCNC: 0.2 MG/DL

## 2022-09-11 PROCEDURE — 81002 URINALYSIS NONAUTO W/O SCOPE: CPT | Performed by: PHYSICIAN ASSISTANT

## 2022-09-11 PROCEDURE — 99213 OFFICE O/P EST LOW 20 MIN: CPT | Performed by: PHYSICIAN ASSISTANT

## 2022-09-11 PROCEDURE — 87086 URINE CULTURE/COLONY COUNT: CPT

## 2022-09-11 RX ORDER — NITROFURANTOIN 25; 75 MG/1; MG/1
100 CAPSULE ORAL 2 TIMES DAILY
Qty: 10 CAPSULE | Refills: 0 | Status: SHIPPED | OUTPATIENT
Start: 2022-09-11 | End: 2022-09-16

## 2022-09-11 ASSESSMENT — ENCOUNTER SYMPTOMS
VOMITING: 0
EYE REDNESS: 0
COUGH: 0
FEVER: 0
NAUSEA: 0
EYE DISCHARGE: 0
FLANK PAIN: 0

## 2022-09-11 ASSESSMENT — FIBROSIS 4 INDEX: FIB4 SCORE: 1.1

## 2022-09-11 NOTE — PROGRESS NOTES
Subjective     Lurdes Ramon is a 67 y.o. female who presents with UTI (Freq urination, pain when urinating, urgency./Onset 2 days)        Dysuria   This is a new problem. Episode onset: x 2 days ago. The problem occurs intermittently. The problem has been unchanged. The quality of the pain is described as aching. There has been no fever. There is No history of pyelonephritis. Associated symptoms include frequency and urgency. Pertinent negatives include no flank pain, hematuria, nausea or vomiting. Her past medical history is significant for kidney stones. The patient report a history of kidney stones x 15-17 years ago     PMH:  has a past medical history of Back pain and Insomnia.  MEDS:   Current Outpatient Medications:     zolpidem (AMBIEN) 10 MG Tab, TAKE 1 TABLET BY MOUTH AT BEDTIME AS NEEDED FOR SLEEP, Disp: 30 Tablet, Rfl: 0    HYDROcodone-acetaminophen (NORCO) 5-325 MG Tab per tablet, Take 1 Tablet by mouth every 8 hours as needed (severe pain) for up to 30 days., Disp: 30 Tablet, Rfl: 0    estradiol (VIVELLE DOT) 0.1 MG/24HR PATCH BIWEEKLY, APPLY 1 PATCH TOPICALLY TO THE SKIN 2 TIMES A WEEK AS DIRECTED, Disp: 24 Patch, Rfl: 1    valacyclovir (VALTREX) 1 GM Tab, 2 po bid for 1 day prn outbreak, Disp: 20 Tablet, Rfl: 5    Cholecalciferol (VITAMIN D PO), Take  by mouth., Disp: , Rfl:     Multiple Vitamin (MULTI-VITAMINS) Tab, Take 1 tablet by mouth., Disp: , Rfl:     methocarbamol (ROBAXIN) 750 MG Tab, Take 1 Tablet by mouth 3 times a day as needed (muscle spasm). (Patient not taking: Reported on 9/11/2022), Disp: 90 Tablet, Rfl: 5    meloxicam (MOBIC) 15 MG tablet, Take one  tablet with breakfast as needed for pain. No advil/aleve/motrin/ibuprofen on same day. (Patient not taking: Reported on 9/11/2022), Disp: 90 Tablet, Rfl: 5  ALLERGIES:   Allergies   Allergen Reactions    Pcn [Penicillins] Hives    Sulfa Drugs Hives    Clindamycin Shortness of Breath and Swelling     SURGHX:   Past Surgical  "History:   Procedure Laterality Date    HYSTERECTOMY, TOTAL ABDOMINAL  1994    DENTAL EXTRACTION(S)      LITHOTRIPSY      OTHER ORTHOPEDIC SURGERY      back surgery x 2     SOCHX:  reports that she quit smoking about 25 years ago. Her smoking use included cigarettes. She has a 15.00 pack-year smoking history. She has never used smokeless tobacco. She reports current alcohol use. She reports that she does not use drugs.  FH: Family history was reviewed, no pertinent findings to report      Review of Systems   Constitutional:  Negative for fever.   HENT:  Negative for congestion.    Eyes:  Negative for discharge and redness.   Respiratory:  Negative for cough.    Gastrointestinal:  Negative for nausea and vomiting.   Genitourinary:  Positive for dysuria, frequency and urgency. Negative for flank pain and hematuria.            Objective     /72 (BP Location: Left arm, Patient Position: Sitting, BP Cuff Size: Large adult)   Pulse 76   Temp 36.3 °C (97.3 °F) (Temporal)   Resp 16   Ht 1.676 m (5' 6\")   Wt 79.4 kg (175 lb)   SpO2 96%   BMI 28.25 kg/m²      Physical Exam  Constitutional:       General: She is not in acute distress.     Appearance: Normal appearance. She is well-developed. She is not ill-appearing.   HENT:      Head: Normocephalic and atraumatic.      Right Ear: External ear normal.      Left Ear: External ear normal.   Eyes:      Extraocular Movements: Extraocular movements intact.      Conjunctiva/sclera: Conjunctivae normal.   Cardiovascular:      Rate and Rhythm: Normal rate and regular rhythm.      Heart sounds: Normal heart sounds.   Pulmonary:      Effort: Pulmonary effort is normal. No respiratory distress.      Breath sounds: Normal breath sounds. No wheezing.   Abdominal:      Palpations: Abdomen is soft.      Tenderness: There is no abdominal tenderness. There is no right CVA tenderness or left CVA tenderness.   Musculoskeletal:         General: Normal range of motion.      Cervical " back: Normal range of motion and neck supple.   Skin:     General: Skin is warm and dry.   Neurological:      Mental Status: She is alert and oriented to person, place, and time.             Progress:  Results for orders placed or performed in visit on 09/11/22   POCT Urinalysis   Result Value Ref Range    POC Color Yellow Negative    POC Appearance Slightly cloudy Negative    POC Leukocyte Esterase Negative Negative    POC Nitrites Negative Negative    POC Urobiligen 0.2 Negative (0.2) mg/dL    POC Protein Negative Negative mg/dL    POC Urine PH Negative 5.0 - 8.0    POC Blood Small Negative    POC Specific Gravity 1.015 <1.005 - >1.030    POC Ketones Negative Negative mg/dL    POC Bilirubin Negative Negative mg/dL    POC Glucose Negative Negative mg/dL         Urine Culture - pending             Assessment & Plan      1. Urinary frequency  - POCT Urinalysis  - URINE CULTURE(NEW); Future    2. Acute cystitis with hematuria  - nitrofurantoin (MACROBID) 100 MG Cap; Take 1 Capsule by mouth 2 times a day for 5 days.  Dispense: 10 Capsule; Refill: 0    The patient's presenting symptoms and physical exam findings are consistent with urinary frequency likely secondary to a urinary tract infection.  The patient's physical exam today in clinic was normal.  No CVA tenderness was appreciated.  The patient is nontoxic and appears in no acute distress.  The patient's vital signs are stable and within normal limits.  She is afebrile today in clinic.  The patient's POCT urinalysis today in clinic showed small blood with negative leukocyte esterase and negative nitrates.  We will culture the patient's urine to rule out a possible bacterial source.  Based on patient's presenting symptoms, will prescribe patient Macrobid for presumed urinary tract infection.  Advised the patient to monitor for worsening signs or symptoms.  Recommend OTC medications and supportive care for symptomatic management.  Recommend patient follow-up with her  PCP as needed.  Discussed return precautions with the patient, and she verbalized understanding.    Differential diagnoses, supportive care, and indications for immediate follow-up discussed with patient.   Instructed to return to clinic or nearest emergency department for any change in condition, further concerns, or worsening of symptoms.    OTC Tylenol or Motrin for fever/discomfort.  Drink plenty of fluids  Follow-up with PCP  Return to clinic or go to the ED if symptoms worsen or fail to improve, or if the patient should develop worsening/increasing urinary symptoms, hematuria, flank pain, abdominal pain, nausea/vomiting, fever/chills, and/or any concerning symptoms.      Discussed plan with the patient, and she agrees to the above.     I personally reviewed prior external notes and test results pertinent to today's visit.  I have independently reviewed and interpreted all diagnostics ordered during this urgent care visit.     Please note that this dictation was created using voice recognition software. I have made every reasonable attempt to correct obvious errors, but I expect that there may be errors of grammar and possibly content that I did not discover before finalizing the note.     This note was electronically signed by Katie Kessler PA-C

## 2022-09-12 DIAGNOSIS — G89.29 CHRONIC BILATERAL LOW BACK PAIN WITH RIGHT-SIDED SCIATICA: ICD-10-CM

## 2022-09-12 DIAGNOSIS — M54.41 CHRONIC BILATERAL LOW BACK PAIN WITH RIGHT-SIDED SCIATICA: ICD-10-CM

## 2022-09-13 RX ORDER — HYDROCODONE BITARTRATE AND ACETAMINOPHEN 5; 325 MG/1; MG/1
1 TABLET ORAL EVERY 8 HOURS PRN
Qty: 30 TABLET | Refills: 0 | Status: SHIPPED | OUTPATIENT
Start: 2022-09-13 | End: 2022-10-10 | Stop reason: SDUPTHER

## 2022-09-14 ENCOUNTER — OFFICE VISIT (OUTPATIENT)
Dept: URGENT CARE | Facility: PHYSICIAN GROUP | Age: 67
End: 2022-09-14
Payer: COMMERCIAL

## 2022-09-14 VITALS
RESPIRATION RATE: 18 BRPM | TEMPERATURE: 98.8 F | HEIGHT: 66 IN | SYSTOLIC BLOOD PRESSURE: 116 MMHG | WEIGHT: 175 LBS | DIASTOLIC BLOOD PRESSURE: 78 MMHG | BODY MASS INDEX: 28.12 KG/M2 | OXYGEN SATURATION: 97 % | HEART RATE: 77 BPM

## 2022-09-14 DIAGNOSIS — R10.2 SUPRAPUBIC PAIN: ICD-10-CM

## 2022-09-14 LAB
BACTERIA UR CULT: NORMAL
SIGNIFICANT IND 70042: NORMAL
SITE SITE: NORMAL
SOURCE SOURCE: NORMAL

## 2022-09-14 PROCEDURE — 99214 OFFICE O/P EST MOD 30 MIN: CPT

## 2022-09-14 RX ORDER — IBUPROFEN 800 MG/1
800 TABLET ORAL EVERY 8 HOURS PRN
Qty: 20 TABLET | Refills: 0 | Status: SHIPPED | OUTPATIENT
Start: 2022-09-14 | End: 2023-02-14

## 2022-09-14 ASSESSMENT — ENCOUNTER SYMPTOMS: ABDOMINAL PAIN: 1

## 2022-09-14 ASSESSMENT — FIBROSIS 4 INDEX: FIB4 SCORE: 1.1

## 2022-09-15 NOTE — PROGRESS NOTES
Subjective     Lurdes Ramon is a 67 y.o. female who presents with Nephrolithiasis (Antibiotics arent helping, sharp fluttering pain. Pelvic area pain. )            HPI    Patient presents with symptoms started a week ago.  She endorses dysuria, urinary urgency, and urinary frequency when her symptoms started. Patient was seen here at the urgent care on 9/11/2022 and was given prescription for Macrobid twice daily for 5 days.  Patient reports improvement in her symptoms, although still with suprapubic pain.  She describes her suprapubic pain as sharp, 8 out of 10, at the worst, with no known aggravating or relieving factors.  Patient denies any urinary frequency, urinary urgency, hematuria, vaginal pain, or vaginal discharge.  Patient reports history of kidney stone in 2005, but no intervention was done at that time.  In 2019 she had a CT renal colic evaluation, which showed 1 to 2 mm calcification in the right lower pole calyx, and 1 mm calcification in the right hemipelvis, as well as multiple pelvic phleboliths.    Patient's current problem list, medications, and past medical/surgical history were reviewed in Epic.    PMH:  has a past medical history of Back pain and Insomnia.  MEDS:   Current Outpatient Medications:     ibuprofen (MOTRIN) 800 MG Tab, Take 1 Tablet by mouth every 8 hours as needed for Moderate Pain., Disp: 20 Tablet, Rfl: 0    HYDROcodone-acetaminophen (NORCO) 5-325 MG Tab per tablet, Take 1 Tablet by mouth every 8 hours as needed (severe pain) for up to 30 days., Disp: 30 Tablet, Rfl: 0    nitrofurantoin (MACROBID) 100 MG Cap, Take 1 Capsule by mouth 2 times a day for 5 days., Disp: 10 Capsule, Rfl: 0    zolpidem (AMBIEN) 10 MG Tab, TAKE 1 TABLET BY MOUTH AT BEDTIME AS NEEDED FOR SLEEP, Disp: 30 Tablet, Rfl: 0    estradiol (VIVELLE DOT) 0.1 MG/24HR PATCH BIWEEKLY, APPLY 1 PATCH TOPICALLY TO THE SKIN 2 TIMES A WEEK AS DIRECTED, Disp: 24 Patch, Rfl: 1    valacyclovir (VALTREX) 1 GM Tab, 2  "po bid for 1 day prn outbreak, Disp: 20 Tablet, Rfl: 5    Cholecalciferol (VITAMIN D PO), Take  by mouth., Disp: , Rfl:     Multiple Vitamin (MULTI-VITAMINS) Tab, Take 1 tablet by mouth., Disp: , Rfl:   ALLERGIES:   Allergies   Allergen Reactions    Pcn [Penicillins] Hives    Sulfa Drugs Hives    Clindamycin Shortness of Breath and Swelling     SURGHX:   Past Surgical History:   Procedure Laterality Date    HYSTERECTOMY, TOTAL ABDOMINAL  1994    DENTAL EXTRACTION(S)      LITHOTRIPSY      OTHER ORTHOPEDIC SURGERY      back surgery x 2     SOCHX:  reports that she quit smoking about 25 years ago. Her smoking use included cigarettes. She has a 15.00 pack-year smoking history. She has never used smokeless tobacco. She reports current alcohol use. She reports that she does not use drugs.  FH: Reviewed with patient, not pertinent to this visit.           Review of Systems   Gastrointestinal:  Positive for abdominal pain.   All other systems reviewed and are negative.           Objective     /78   Pulse 77   Temp 37.1 °C (98.8 °F)   Resp 18   Ht 1.676 m (5' 6\")   Wt 79.4 kg (175 lb)   SpO2 97%   BMI 28.25 kg/m²      Physical Exam  Constitutional:       Appearance: Normal appearance.   HENT:      Head: Normocephalic.      Nose: Nose normal.   Eyes:      Extraocular Movements: Extraocular movements intact.   Cardiovascular:      Rate and Rhythm: Normal rate and regular rhythm.      Pulses: Normal pulses.      Heart sounds: Normal heart sounds.   Pulmonary:      Effort: Pulmonary effort is normal.      Breath sounds: Normal breath sounds.   Musculoskeletal:         General: Normal range of motion.      Cervical back: Normal range of motion.   Skin:     General: Skin is warm and dry.   Neurological:      General: No focal deficit present.      Mental Status: She is alert.   Psychiatric:         Mood and Affect: Mood normal.         Behavior: Behavior normal.         Judgment: Judgment normal.     Urinalysis " results: Blood-trace, otherwise unremarkable                Assessment & Plan        1. Suprapubic pain    - ibuprofen (MOTRIN) 800 MG Tab; Take 1 Tablet by mouth every 8 hours as needed for Moderate Pain.  Dispense: 20 Tablet; Refill: 0  - US-PELVIC COMPLETE (TRANSABDOMINAL/TRANSVAGINAL) (COMBO); Future    Patient's urinalysis is unremarkable.  Transabdominal/transvaginal pelvic ultrasound is ordered.  May take ibuprofen 800 mg every 8 hours as needed for pain relief.  Patient was advised to follow-up with her primary care if symptoms persist.  Educated on signs and symptoms watch out for, when to return to the clinic or go to the ER.   Discussed treatment plan with patient, s/he is agreeable and verbalized understanding.  Educated patient on signs and symptoms watch out for, when to return to the clinic or go to the ER.    Differential diagnoses, supportive care, and indications for immediate follow-up discussed with patient.  Pathogenesis of diagnosis discussed including typical length and natural progression.  Instructed to return to clinic or nearest emergency department for any change in condition, further concerns, or worsening of symptoms.    Electronically Signed by MONICA Tellez

## 2022-09-21 DIAGNOSIS — G47.00 INSOMNIA, UNSPECIFIED TYPE: ICD-10-CM

## 2022-09-22 RX ORDER — ZOLPIDEM TARTRATE 10 MG/1
10 TABLET ORAL
Qty: 30 TABLET | Refills: 0 | Status: SHIPPED | OUTPATIENT
Start: 2022-09-22 | End: 2022-10-21 | Stop reason: SDUPTHER

## 2022-09-23 ENCOUNTER — OFFICE VISIT (OUTPATIENT)
Dept: MEDICAL GROUP | Facility: PHYSICIAN GROUP | Age: 67
End: 2022-09-23
Payer: COMMERCIAL

## 2022-09-23 VITALS
DIASTOLIC BLOOD PRESSURE: 74 MMHG | WEIGHT: 184.6 LBS | HEART RATE: 82 BPM | OXYGEN SATURATION: 97 % | RESPIRATION RATE: 18 BRPM | SYSTOLIC BLOOD PRESSURE: 126 MMHG | HEIGHT: 66 IN | BODY MASS INDEX: 29.67 KG/M2 | TEMPERATURE: 97.8 F

## 2022-09-23 DIAGNOSIS — Z23 NEED FOR VACCINATION: ICD-10-CM

## 2022-09-23 DIAGNOSIS — R10.2 PELVIC PAIN: ICD-10-CM

## 2022-09-23 PROBLEM — Z00.00 WELLNESS EXAMINATION: Status: RESOLVED | Noted: 2019-04-02 | Resolved: 2022-09-23

## 2022-09-23 PROCEDURE — 90471 IMMUNIZATION ADMIN: CPT | Performed by: FAMILY MEDICINE

## 2022-09-23 PROCEDURE — 99213 OFFICE O/P EST LOW 20 MIN: CPT | Mod: 25 | Performed by: FAMILY MEDICINE

## 2022-09-23 PROCEDURE — 90677 PCV20 VACCINE IM: CPT | Performed by: FAMILY MEDICINE

## 2022-09-23 ASSESSMENT — FIBROSIS 4 INDEX: FIB4 SCORE: 1.1

## 2022-09-23 NOTE — ASSESSMENT & PLAN NOTE
This is an acute resolved problem.  Patient was seen in urgent care twice for suprapubic pain.  On the initial evaluation she had a urinalysis done and was put on antibiotics until the culture came back.  It was negative so that was stopped.  On the second visit to urgent care they ordered a pelvic ultrasound.  However patient has had a complete hysterectomy so that was canceled.  Her pain is now resolved.  She is wonder if perhaps she had a kidney stone that passed.  She never had any flank or upper abdominal pain.

## 2022-09-23 NOTE — PROGRESS NOTES
Subjective:     CC: Here for follow-up on suprapubic pain.    HPI:   Lurdes presents today with the following medical concerns:    Pelvic pain  This is an acute resolved problem.  Patient was seen in urgent care twice for suprapubic pain.  On the initial evaluation she had a urinalysis done and was put on antibiotics until the culture came back.  It was negative so that was stopped.  On the second visit to urgent care they ordered a pelvic ultrasound.  However patient has had a complete hysterectomy so that was canceled.  Her pain is now resolved.  She is wonder if perhaps she had a kidney stone that passed.  She never had any flank or upper abdominal pain.    Past Medical History:   Diagnosis Date    Back pain     Insomnia        Social History     Tobacco Use    Smoking status: Former     Packs/day: 1.00     Years: 15.00     Pack years: 15.00     Types: Cigarettes     Quit date: 10/26/1996     Years since quittin.9    Smokeless tobacco: Never   Vaping Use    Vaping Use: Never used   Substance Use Topics    Alcohol use: Yes     Comment: 4 times yearly    Drug use: No       Current Outpatient Medications Ordered in Epic   Medication Sig Dispense Refill    zolpidem (AMBIEN) 10 MG Tab Take 1 Tablet by mouth at bedtime as needed for Sleep for up to 30 days. 30 Tablet 0    ibuprofen (MOTRIN) 800 MG Tab Take 1 Tablet by mouth every 8 hours as needed for Moderate Pain. 20 Tablet 0    HYDROcodone-acetaminophen (NORCO) 5-325 MG Tab per tablet Take 1 Tablet by mouth every 8 hours as needed (severe pain) for up to 30 days. 30 Tablet 0    estradiol (VIVELLE DOT) 0.1 MG/24HR PATCH BIWEEKLY APPLY 1 PATCH TOPICALLY TO THE SKIN 2 TIMES A WEEK AS DIRECTED 24 Patch 1    valacyclovir (VALTREX) 1 GM Tab 2 po bid for 1 day prn outbreak 20 Tablet 5    Multiple Vitamin (MULTI-VITAMINS) Tab Take 1 tablet by mouth.      Cholecalciferol (VITAMIN D PO) Take  by mouth.       No current Epic-ordered facility-administered medications on  "file.       Allergies:  Pcn [penicillins], Sulfa drugs, and Clindamycin    Health Maintenance: Completed    ROS:  Gen: no fevers/chills, no changes in weight  Eyes: no changes in vision  ENT: no sore throat, no hearing loss, no bloody nose  Pulm: no sob, no cough  CV: no chest pain, no palpitations  GI: no nausea/vomiting, no diarrhea  : no dysuria  MSk: no myalgias  Skin: no rash  Neuro: no headaches, no numbness/tingling  Heme/Lymph: no easy bruising      Objective:       Exam:  /74 (BP Location: Right arm, Patient Position: Sitting, BP Cuff Size: Adult)   Pulse 82   Temp 36.6 °C (97.8 °F) (Temporal)   Resp 18   Ht 1.676 m (5' 6\")   Wt 83.7 kg (184 lb 9.6 oz)   SpO2 97%   BMI 29.80 kg/m²  Body mass index is 29.8 kg/m².    Gen: Alert and oriented, No apparent distress.    Labs: Reviewed    Assessment & Plan:     67 y.o. female with the following -     1. Need for vaccination  Patient's pneumococcal vaccine given today.  - Pneumococcal Conjugate Vaccine 20-Valent (19 yrs+)    2. Pelvic pain  This is a acute resolved problem.  Patient was told I am not sure if she had a small stone that passed or not.  Her CT in 2019 showed some 1 to 2 mm calcifications in the right lower kidney and it is possible she passed 1 of those.  Since she is not having any symptoms now we will just continue to follow.  If her symptoms recurred then we may want to go ahead and get a CT scan to rule out kidney stone and bladder abnormalities.  But for now we will just monitor.      Return if symptoms worsen or fail to improve.  20 minutes spent with the patient.  Please note that this dictation was created using voice recognition software. I have made every reasonable attempt to correct obvious errors, but I expect that there are errors of grammar and possibly content that I did not discover before finalizing the note.        "

## 2022-10-10 ENCOUNTER — PATIENT MESSAGE (OUTPATIENT)
Dept: MEDICAL GROUP | Facility: PHYSICIAN GROUP | Age: 67
End: 2022-10-10
Payer: COMMERCIAL

## 2022-10-10 DIAGNOSIS — G89.29 CHRONIC BILATERAL LOW BACK PAIN WITH RIGHT-SIDED SCIATICA: ICD-10-CM

## 2022-10-10 DIAGNOSIS — M54.41 CHRONIC BILATERAL LOW BACK PAIN WITH RIGHT-SIDED SCIATICA: ICD-10-CM

## 2022-10-10 RX ORDER — HYDROCODONE BITARTRATE AND ACETAMINOPHEN 5; 325 MG/1; MG/1
1 TABLET ORAL EVERY 8 HOURS PRN
Qty: 30 TABLET | Refills: 0 | Status: SHIPPED | OUTPATIENT
Start: 2022-10-10 | End: 2022-11-07 | Stop reason: SDUPTHER

## 2022-10-21 DIAGNOSIS — G47.00 INSOMNIA, UNSPECIFIED TYPE: ICD-10-CM

## 2022-10-21 RX ORDER — ZOLPIDEM TARTRATE 10 MG/1
10 TABLET ORAL
Qty: 30 TABLET | Refills: 0 | Status: SHIPPED | OUTPATIENT
Start: 2022-10-21 | End: 2022-11-18 | Stop reason: SDUPTHER

## 2022-11-07 DIAGNOSIS — M54.41 CHRONIC BILATERAL LOW BACK PAIN WITH RIGHT-SIDED SCIATICA: ICD-10-CM

## 2022-11-07 DIAGNOSIS — G89.29 CHRONIC BILATERAL LOW BACK PAIN WITH RIGHT-SIDED SCIATICA: ICD-10-CM

## 2022-11-07 RX ORDER — HYDROCODONE BITARTRATE AND ACETAMINOPHEN 5; 325 MG/1; MG/1
1 TABLET ORAL EVERY 8 HOURS PRN
Qty: 30 TABLET | Refills: 0 | Status: SHIPPED | OUTPATIENT
Start: 2022-11-07 | End: 2022-11-09 | Stop reason: SDUPTHER

## 2022-11-09 ENCOUNTER — OFFICE VISIT (OUTPATIENT)
Dept: MEDICAL GROUP | Facility: PHYSICIAN GROUP | Age: 67
End: 2022-11-09
Payer: COMMERCIAL

## 2022-11-09 VITALS
SYSTOLIC BLOOD PRESSURE: 148 MMHG | TEMPERATURE: 97.9 F | HEIGHT: 66 IN | WEIGHT: 184.2 LBS | BODY MASS INDEX: 29.6 KG/M2 | RESPIRATION RATE: 16 BRPM | DIASTOLIC BLOOD PRESSURE: 88 MMHG | HEART RATE: 88 BPM | OXYGEN SATURATION: 96 %

## 2022-11-09 DIAGNOSIS — M54.41 CHRONIC BILATERAL LOW BACK PAIN WITH RIGHT-SIDED SCIATICA: ICD-10-CM

## 2022-11-09 DIAGNOSIS — G89.29 CHRONIC BILATERAL LOW BACK PAIN WITH RIGHT-SIDED SCIATICA: ICD-10-CM

## 2022-11-09 DIAGNOSIS — G47.00 INSOMNIA, UNSPECIFIED TYPE: ICD-10-CM

## 2022-11-09 DIAGNOSIS — Z78.0 POSTMENOPAUSAL ESTROGEN DEFICIENCY: ICD-10-CM

## 2022-11-09 PROCEDURE — 99213 OFFICE O/P EST LOW 20 MIN: CPT | Performed by: FAMILY MEDICINE

## 2022-11-09 RX ORDER — HYDROCODONE BITARTRATE AND ACETAMINOPHEN 5; 325 MG/1; MG/1
1.5 TABLET ORAL EVERY 8 HOURS PRN
Qty: 45 TABLET | Refills: 0 | Status: SHIPPED | OUTPATIENT
Start: 2022-11-09 | End: 2022-11-18 | Stop reason: SDUPTHER

## 2022-11-09 RX ORDER — HYDROCODONE BITARTRATE AND ACETAMINOPHEN 5; 325 MG/1; MG/1
1.5 TABLET ORAL EVERY 8 HOURS PRN
Qty: 30 TABLET | Refills: 0 | Status: SHIPPED | OUTPATIENT
Start: 2022-11-09 | End: 2022-11-09 | Stop reason: SDUPTHER

## 2022-11-09 RX ORDER — HYDROCODONE BITARTRATE AND ACETAMINOPHEN 5; 325 MG/1; MG/1
1.5 TABLET ORAL EVERY 8 HOURS PRN
Qty: 45 TABLET | Refills: 0 | Status: SHIPPED | OUTPATIENT
Start: 2022-11-09 | End: 2022-11-09 | Stop reason: SDUPTHER

## 2022-11-09 ASSESSMENT — FIBROSIS 4 INDEX: FIB4 SCORE: 1.1

## 2022-11-09 NOTE — ASSESSMENT & PLAN NOTE
This is a chronic problem.  Patient takes Ambien on a regular basis.  She is told we need to get a controlled substance agreement signed today.  She is agreeable with that.

## 2022-11-09 NOTE — PROGRESS NOTES
Subjective:     CC: Here to discuss her controlled medications.      HPI:   Lurdes presents today with the following medical concerns:    Chronic bilateral low back pain with right-sided sciatica  This is a chronic problem.  Patient is here to discuss the [facility] and pharmaceutical board recommendations for care.  I told her about the consent form we need to get signed and we need to do a drug screen.  She had an x-ray and MRI done about a year ago so we do not need to do that at this point.  She understands all of these protocols and is willing to do so.  She does state that sometimes her back is so bad she wishes she could have 1-1/2 pills a day instead of 1 a day.  She does end up spending some time just laying on the bed until her back pain subsides.  She has tried lots of different modalities in the past without success.  She is very low risk for abusing the medications.  I told her today would go ahead and increase her dose from 1 a day to 1-1/2 a day.  She is also told she needs to be seen every 90 days in the clinic to continue to renew her prescription per protocol.    Insomnia  This is a chronic problem.  Patient takes Ambien on a regular basis.  She is told we need to get a controlled substance agreement signed today.  She is agreeable with that.    Past Medical History:   Diagnosis Date    Back pain     Insomnia        Social History     Tobacco Use    Smoking status: Former     Packs/day: 1.00     Years: 15.00     Pack years: 15.00     Types: Cigarettes     Quit date: 10/26/1996     Years since quittin.0    Smokeless tobacco: Never   Vaping Use    Vaping Use: Never used   Substance Use Topics    Alcohol use: Yes     Comment: 4 times yearly    Drug use: No       Current Outpatient Medications Ordered in Epic   Medication Sig Dispense Refill    HYDROcodone-acetaminophen (NORCO) 5-325 MG Tab per tablet Take 1.5 Tablets by mouth every 8 hours as needed (severe pain) for up to 45 days. 30 Tablet 0     "zolpidem (AMBIEN) 10 MG Tab Take 1 Tablet by mouth at bedtime as needed for Sleep for up to 30 days. 30 Tablet 0    ibuprofen (MOTRIN) 800 MG Tab Take 1 Tablet by mouth every 8 hours as needed for Moderate Pain. 20 Tablet 0    estradiol (VIVELLE DOT) 0.1 MG/24HR PATCH BIWEEKLY APPLY 1 PATCH TOPICALLY TO THE SKIN 2 TIMES A WEEK AS DIRECTED 24 Patch 1    valacyclovir (VALTREX) 1 GM Tab 2 po bid for 1 day prn outbreak 20 Tablet 5    Cholecalciferol (VITAMIN D PO) Take  by mouth.      Multiple Vitamin (MULTI-VITAMINS) Tab Take 1 tablet by mouth.       No current Epic-ordered facility-administered medications on file.       Allergies:  Pcn [penicillins], Sulfa drugs, and Clindamycin    Health Maintenance: Completed    ROS:  Gen: no fevers/chills, no changes in weight  Eyes: no changes in vision  ENT: no sore throat, no hearing loss, no bloody nose  Pulm: no sob, no cough  CV: no chest pain, no palpitations  GI: no nausea/vomiting, no diarrhea  : no dysuria  Skin: no rash  Neuro: no headaches, no numbness/tingling  Heme/Lymph: no easy bruising      Objective:       Exam:  BP (!) 148/88 (BP Location: Left arm, Patient Position: Sitting, BP Cuff Size: Large adult)   Pulse 88   Temp 36.6 °C (97.9 °F) (Temporal)   Resp 16   Ht 1.676 m (5' 6\")   Wt 83.6 kg (184 lb 3.2 oz)   SpO2 96%   BMI 29.73 kg/m²  Body mass index is 29.73 kg/m².    Gen: Alert and oriented, No apparent distress.  Psych: Patient is alert and oriented x3.  No unusual topics expressed.  Insight and judgment is good.    Assessment & Plan:     67 y.o. female with the following -     1. Insomnia, unspecified type  This is a chronic problem.  Agreement signed.  - Controlled Substance Treatment Agreement  - PAIN MANAGEMENT SCRN, UR; Future    2. Chronic bilateral low back pain with right-sided sciatica  This is a chronic problem.  Medication changed to 1-1/2 a day.  Drug screen ordered.  And her opioid agreement signed.  - HYDROcodone-acetaminophen (NORCO) " 5-325 MG Tab per tablet; Take 1.5 Tablets by mouth every 8 hours as needed (severe pain) for up to 45 days.  Dispense: 30 Tablet; Refill: 0    3. Postmenopausal estrogen deficiency  This is a screening issue.  DEXA scan ordered.  - DS-BONE DENSITY STUDY (DEXA); Future      Return in about 3 months (around 2/9/2023) for Long.    Please note that this dictation was created using voice recognition software. I have made every reasonable attempt to correct obvious errors, but I expect that there are errors of grammar and possibly content that I did not discover before finalizing the note.

## 2022-11-09 NOTE — ASSESSMENT & PLAN NOTE
This is a chronic problem.  Patient is here to discuss the [facility] and pharmaceutical board recommendations for care.  I told her about the consent form we need to get signed and we need to do a drug screen.  She had an x-ray and MRI done about a year ago so we do not need to do that at this point.  She understands all of these protocols and is willing to do so.  She does state that sometimes her back is so bad she wishes she could have 1-1/2 pills a day instead of 1 a day.  She does end up spending some time just laying on the bed until her back pain subsides.  She has tried lots of different modalities in the past without success.  She is very low risk for abusing the medications.  I told her today would go ahead and increase her dose from 1 a day to 1-1/2 a day.  She is also told she needs to be seen every 90 days in the clinic to continue to renew her prescription per protocol.

## 2022-11-17 ENCOUNTER — DOCUMENTATION (OUTPATIENT)
Dept: MEDICAL GROUP | Facility: PHYSICIAN GROUP | Age: 67
End: 2022-11-17
Payer: COMMERCIAL

## 2022-11-17 NOTE — PROGRESS NOTES
FINAL PRIOR AUTHORIZATION STATUS:    1.  Name of Medication & Dose: HYDROcodone-acetaminophen (NORCO) 5-325 MG Tab per tablet     2. Prior Auth Status: Approved through 11/17/2022 through 05/16/2023     3. Action Taken: Pharmacy Notified: yes Patient Notified: yes

## 2022-11-17 NOTE — PROGRESS NOTES
DOCUMENTATION OF PAR STATUS:    1. Name of Medication & Dose: HYDROcodone-acetaminophen (NORCO) 5-325 MG Tab per tablet     2. Name of Prescription Coverage Company & phone #: [employer] [place]     3. Date Prior Auth Submitted: 11/17/2022     4. What information was given to obtain insurance decision? Patient diagnosis and office visit note     5. Prior Auth Status? Pending    6. Patient Notified: yes

## 2022-11-18 DIAGNOSIS — M54.41 CHRONIC BILATERAL LOW BACK PAIN WITH RIGHT-SIDED SCIATICA: ICD-10-CM

## 2022-11-18 DIAGNOSIS — G47.00 INSOMNIA, UNSPECIFIED TYPE: ICD-10-CM

## 2022-11-18 DIAGNOSIS — G89.29 CHRONIC BILATERAL LOW BACK PAIN WITH RIGHT-SIDED SCIATICA: ICD-10-CM

## 2022-11-18 RX ORDER — HYDROCODONE BITARTRATE AND ACETAMINOPHEN 5; 325 MG/1; MG/1
1.5 TABLET ORAL EVERY 8 HOURS PRN
Qty: 45 TABLET | Refills: 0 | Status: SHIPPED | OUTPATIENT
Start: 2022-11-18 | End: 2022-12-18

## 2022-11-18 RX ORDER — ZOLPIDEM TARTRATE 10 MG/1
10 TABLET ORAL
Qty: 30 TABLET | Refills: 0 | Status: SHIPPED | OUTPATIENT
Start: 2022-11-18 | End: 2022-12-19 | Stop reason: SDUPTHER

## 2022-11-18 RX ORDER — HYDROCODONE BITARTRATE AND ACETAMINOPHEN 5; 325 MG/1; MG/1
1.5 TABLET ORAL EVERY 8 HOURS PRN
Qty: 45 TABLET | Refills: 0 | Status: CANCELLED | OUTPATIENT
Start: 2022-11-18 | End: 2022-12-18

## 2022-12-01 ENCOUNTER — HOSPITAL ENCOUNTER (OUTPATIENT)
Facility: MEDICAL CENTER | Age: 67
End: 2022-12-01
Attending: FAMILY MEDICINE
Payer: COMMERCIAL

## 2022-12-01 DIAGNOSIS — G47.00 INSOMNIA, UNSPECIFIED TYPE: ICD-10-CM

## 2022-12-01 PROCEDURE — G0481 DRUG TEST DEF 8-14 CLASSES: HCPCS

## 2022-12-06 LAB
1OH-MIDAZOLAM UR QL SCN: NOT DETECTED
6MAM UR QL: NOT DETECTED
7AMINOCLONAZEPAM UR QL: NOT DETECTED
A-OH ALPRAZ UR QL: NOT DETECTED
ALPRAZ UR QL: NOT DETECTED
AMPHET UR QL SCN: NOT DETECTED
ANNOTATION COMMENT IMP: NORMAL
ANNOTATION COMMENT IMP: NORMAL
BARBITURATES UR QL: NOT DETECTED
BUPRENORPHINE UR QL: NOT DETECTED
BZE UR QL: NOT DETECTED
CARBOXYTHC UR QL: NOT DETECTED
CARISOPRODOL UR QL: NOT DETECTED
CLONAZEPAM UR QL: NOT DETECTED
CODEINE UR QL: NOT DETECTED
DIAZEPAM UR QL: NOT DETECTED
ETHYL GLUCURONIDE UR QL: NOT DETECTED
FENTANYL UR QL: NOT DETECTED
GABAPENTIN UR QL: NOT DETECTED
HYDROCODONE UR QL: PRESENT
HYDROMORPHONE UR QL: PRESENT
LORAZEPAM UR QL: NOT DETECTED
MDA UR QL: NOT DETECTED
MDEA UR QL: NOT DETECTED
MDMA UR QL: NOT DETECTED
MEPERIDINE UR QL: NOT DETECTED
METHADONE UR QL: NOT DETECTED
METHAMPHET UR QL: NOT DETECTED
MIDAZOLAM UR QL SCN: NOT DETECTED
MORPHINE UR QL: NOT DETECTED
NALOXONE UR QL SCN: NOT DETECTED
NORBUPRENORPHINE UR QL CFM: NOT DETECTED
NORDIAZEPAM UR QL: NOT DETECTED
NORFENTANYL UR QL: NOT DETECTED
NORHYDROCODONE UR QL CFM: PRESENT
NOROXYCODONE UR QL CFM: NOT DETECTED
NOROXYMORPH CO100 Q0458: NOT DETECTED
OXAZEPAM UR QL: NOT DETECTED
OXYCODONE UR QL: NOT DETECTED
OXYMORPHONE UR QL: NOT DETECTED
PATHOLOGY STUDY: NORMAL
PCP UR QL: NOT DETECTED
PHENTERMINE UR QL: NOT DETECTED
PPAA UR QL: NOT DETECTED
PREGABALIN UR QL SCN: NOT DETECTED
SERVICE CMNT-IMP: NORMAL
TAPENADOL OSULF CO200 Q0473: NOT DETECTED
TAPENTADOL UR QL SCN: NOT DETECTED
TEMAZEPAM UR QL: NOT DETECTED
TRAMADOL UR QL: NOT DETECTED
ZOLPIDEM PHENYL-4-CARB UR QL SCN: PRESENT
ZOLPIDEM UR QL: PRESENT

## 2022-12-12 ENCOUNTER — HOSPITAL ENCOUNTER (OUTPATIENT)
Dept: RADIOLOGY | Facility: MEDICAL CENTER | Age: 67
End: 2022-12-12
Attending: FAMILY MEDICINE
Payer: COMMERCIAL

## 2022-12-12 DIAGNOSIS — Z78.0 POSTMENOPAUSAL ESTROGEN DEFICIENCY: ICD-10-CM

## 2022-12-12 PROCEDURE — 77080 DXA BONE DENSITY AXIAL: CPT

## 2022-12-16 DIAGNOSIS — B00.1 COLD SORE: ICD-10-CM

## 2022-12-16 RX ORDER — VALACYCLOVIR HYDROCHLORIDE 1 G/1
TABLET, FILM COATED ORAL
Qty: 20 TABLET | Refills: 5 | Status: SHIPPED | OUTPATIENT
Start: 2022-12-16 | End: 2023-04-08 | Stop reason: SDUPTHER

## 2022-12-19 DIAGNOSIS — G47.00 INSOMNIA, UNSPECIFIED TYPE: ICD-10-CM

## 2022-12-19 RX ORDER — ZOLPIDEM TARTRATE 10 MG/1
10 TABLET ORAL
Qty: 30 TABLET | Refills: 0 | Status: SHIPPED | OUTPATIENT
Start: 2022-12-19 | End: 2023-01-17 | Stop reason: SDUPTHER

## 2022-12-19 NOTE — TELEPHONE ENCOUNTER
Received request via: Patient    Was the patient seen in the last year in this department? Yes    Does the patient have an active prescription (recently filled or refills available) for medication(s) requested? No    Does the patient have longterm Plus and need 100 day supply (blood pressure, diabetes and cholesterol meds only)? Patient does not have SCP

## 2022-12-27 DIAGNOSIS — G89.29 CHRONIC BILATERAL LOW BACK PAIN WITH RIGHT-SIDED SCIATICA: ICD-10-CM

## 2022-12-27 DIAGNOSIS — M54.41 CHRONIC BILATERAL LOW BACK PAIN WITH RIGHT-SIDED SCIATICA: ICD-10-CM

## 2022-12-27 RX ORDER — HYDROCODONE BITARTRATE AND ACETAMINOPHEN 5; 325 MG/1; MG/1
1.5 TABLET ORAL EVERY 8 HOURS PRN
Qty: 45 TABLET | Refills: 0 | Status: SHIPPED | OUTPATIENT
Start: 2022-12-27 | End: 2023-01-25 | Stop reason: SDUPTHER

## 2022-12-28 DIAGNOSIS — Z79.890 HORMONE REPLACEMENT THERAPY (HRT): ICD-10-CM

## 2022-12-29 DIAGNOSIS — Z79.890 HORMONE REPLACEMENT THERAPY (HRT): ICD-10-CM

## 2022-12-29 RX ORDER — ESTRADIOL 0.1 MG/D
FILM, EXTENDED RELEASE TRANSDERMAL
Qty: 24 PATCH | Refills: 3 | Status: SHIPPED | OUTPATIENT
Start: 2022-12-29 | End: 2023-02-10

## 2022-12-29 RX ORDER — ESTRADIOL 0.1 MG/D
FILM, EXTENDED RELEASE TRANSDERMAL
Qty: 24 PATCH | Refills: 1 | Status: SHIPPED | OUTPATIENT
Start: 2022-12-29 | End: 2022-12-29 | Stop reason: SDUPTHER

## 2022-12-29 NOTE — TELEPHONE ENCOUNTER
Received request via: Pharmacy    Was the patient seen in the last year in this department? Yes    Does the patient have an active prescription (recently filled or refills available) for medication(s) requested? No    Does the patient have residential Plus and need 100 day supply (blood pressure, diabetes and cholesterol meds only)? Medication is not for cholesterol, blood pressure or diabetes

## 2023-01-17 DIAGNOSIS — G47.00 INSOMNIA, UNSPECIFIED TYPE: ICD-10-CM

## 2023-01-17 RX ORDER — ZOLPIDEM TARTRATE 10 MG/1
10 TABLET ORAL
Qty: 30 TABLET | Refills: 2 | Status: SHIPPED | OUTPATIENT
Start: 2023-01-17 | End: 2023-04-17

## 2023-01-25 DIAGNOSIS — M54.41 CHRONIC BILATERAL LOW BACK PAIN WITH RIGHT-SIDED SCIATICA: ICD-10-CM

## 2023-01-25 DIAGNOSIS — G89.29 CHRONIC BILATERAL LOW BACK PAIN WITH RIGHT-SIDED SCIATICA: ICD-10-CM

## 2023-01-25 RX ORDER — HYDROCODONE BITARTRATE AND ACETAMINOPHEN 5; 325 MG/1; MG/1
1.5 TABLET ORAL EVERY 8 HOURS PRN
Qty: 45 TABLET | Refills: 0 | Status: SHIPPED | OUTPATIENT
Start: 2023-01-25 | End: 2023-02-24 | Stop reason: SDUPTHER

## 2023-02-14 ENCOUNTER — OFFICE VISIT (OUTPATIENT)
Dept: MEDICAL GROUP | Facility: PHYSICIAN GROUP | Age: 68
End: 2023-02-14
Payer: COMMERCIAL

## 2023-02-14 VITALS
BODY MASS INDEX: 29.25 KG/M2 | TEMPERATURE: 98.7 F | HEART RATE: 86 BPM | SYSTOLIC BLOOD PRESSURE: 126 MMHG | HEIGHT: 66 IN | OXYGEN SATURATION: 96 % | DIASTOLIC BLOOD PRESSURE: 78 MMHG | WEIGHT: 182 LBS | RESPIRATION RATE: 18 BRPM

## 2023-02-14 DIAGNOSIS — Z79.890 HORMONE REPLACEMENT THERAPY (HRT): ICD-10-CM

## 2023-02-14 PROCEDURE — 99213 OFFICE O/P EST LOW 20 MIN: CPT | Performed by: FAMILY MEDICINE

## 2023-02-14 RX ORDER — CALCIUM CARBONATE 500 MG/1
500 TABLET, CHEWABLE ORAL DAILY
COMMUNITY

## 2023-02-14 RX ORDER — ESTRADIOL 1 MG/1
1 TABLET ORAL DAILY
Qty: 30 TABLET | Refills: 1 | Status: SHIPPED | OUTPATIENT
Start: 2023-02-14 | End: 2023-03-15 | Stop reason: SDUPTHER

## 2023-02-14 ASSESSMENT — FIBROSIS 4 INDEX: FIB4 SCORE: 1.1

## 2023-02-14 ASSESSMENT — PATIENT HEALTH QUESTIONNAIRE - PHQ9: CLINICAL INTERPRETATION OF PHQ2 SCORE: 0

## 2023-02-14 NOTE — ASSESSMENT & PLAN NOTE
This is a chronic problem.  Patient finally did get the Climara namebrand patches but they are apparently different than her older ones.  She states that she puts them on and she still has hot flashes.  It is from the same company.  She did have an older patch left from a previous prescription and she put that on.  She immediately felt relief from her hot flashes.  Apparently there is some change in the color of the packages and they may have changed the gel component.  But we have no knowledge of that.  She talked about alternatives.  in the past she tried was tried on what sounded like Premarin but did not do good with that..  She has not been on Estrace.  She is asking about the possibility of injections but we do not do those here in the clinic.

## 2023-02-14 NOTE — PROGRESS NOTES
Subjective:     CC: Here for hot flashes and mood changes related to low estrogen.    HPI:   Lurdes presents today with the following medical concerns:    Hormone replacement therapy (HRT)  This is a chronic problem.  Patient finally did get the Climara namebrand patches but they are apparently different than her older ones.  She states that she puts them on and she still has hot flashes.  It is from the same company.  She did have an older patch left from a previous prescription and she put that on.  She immediately felt relief from her hot flashes.  Apparently there is some change in the color of the packages and they may have changed the gel component.  But we have no knowledge of that.  She talked about alternatives.  in the past she tried was tried on what sounded like Premarin but did not do good with that..  She has not been on Estrace.  She is asking about the possibility of injections but we do not do those here in the clinic.    Past Medical History:   Diagnosis Date    Back pain     Insomnia        Social History     Tobacco Use    Smoking status: Former     Packs/day: 1.00     Years: 15.00     Pack years: 15.00     Types: Cigarettes     Quit date: 10/26/1996     Years since quittin.3    Smokeless tobacco: Never   Vaping Use    Vaping Use: Never used   Substance Use Topics    Alcohol use: Yes     Comment: 4 times yearly    Drug use: No       Current Outpatient Medications Ordered in Epic   Medication Sig Dispense Refill    calcium carbonate (TUMS) 500 MG Chew Tab Chew 500 mg every day.      estradiol (ESTRACE) 1 MG Tab Take 1 Tablet by mouth every day. 30 Tablet 1    HYDROcodone-acetaminophen (NORCO) 5-325 MG Tab per tablet Take 1.5 Tablets by mouth every 8 hours as needed (pain) for up to 30 days. 45 Tablet 0    zolpidem (AMBIEN) 10 MG Tab Take 1 Tablet by mouth at bedtime as needed for Sleep for up to 90 days. 30 Tablet 2    valacyclovir (VALTREX) 1 GM Tab 2 po bid for 1 day prn outbreak 20 Tablet 5  "   Cholecalciferol (VITAMIN D PO) Take  by mouth.      Multiple Vitamin (MULTI-VITAMINS) Tab Take 1 tablet by mouth.       No current Epic-ordered facility-administered medications on file.       Allergies:  Pcn [penicillins], Sulfa drugs, and Clindamycin    Health Maintenance: Completed    ROS:  Gen: no fevers/chills, no changes in weight  Eyes: no changes in vision  ENT: no sore throat, no hearing loss, no bloody nose  Pulm: no sob, no cough  CV: no chest pain, no palpitations  GI: no nausea/vomiting, no diarrhea  : no dysuria  MSk: no myalgias  Skin: no rash  Neuro: no headaches, no numbness/tingling  Heme/Lymph: no easy bruising      Objective:       Exam:  /78 (BP Location: Left arm, Patient Position: Sitting, BP Cuff Size: Adult)   Pulse 86   Temp 37.1 °C (98.7 °F) (Temporal)   Resp 18   Ht 1.676 m (5' 6\")   Wt 82.6 kg (182 lb)   SpO2 96%   BMI 29.38 kg/m²  Body mass index is 29.38 kg/m².    Gen: Alert and oriented, No apparent distress.  Neck: Neck is supple without lymphadenopathy.  Ext: No clubbing, cyanosis, edema.  Psych: Patient is alert and oriented x3.  No unusual thought Humberto expressed.  Insight and judgment is good.      Assessment & Plan:     67 y.o. female with the following -     1. Hormone replacement therapy (HRT)  This is a chronic problem.  After extensive discussion she agreed to try oral Estrace pills to see how that does for her.  I told her that would give her the quickest relief of her symptoms that she needs that for her job in particular.  We do not have the estrogen shots available here in the clinic and it would be difficult for her to give herself injections.  She is to let me know how she does on the pills.      Return if symptoms worsen or fail to improve.    Please note that this dictation was created using voice recognition software. I have made every reasonable attempt to correct obvious errors, but I expect that there are errors of grammar and possibly content " that I did not discover before finalizing the note.

## 2023-02-15 NOTE — TELEPHONE ENCOUNTER
"----- Message from Iva Maya P.A.-C. sent at 6/19/2020 12:19 PM PDT -----  Regarding: FW: Prescription Question  Contact: [phone]  I don't see that they've discussed HRT since 2018, I'd prefer confirmation with [facility].     Thank you,    Iva Maya PA-C    ----- Message -----  From: Guera Lara, Med Ass't  Sent: 6/19/2020  10:55 AM PDT  To: Iva Maya P.A.-C.  Subject: FW: Prescription Question                        Will you fill this?  ----- Message -----  From: Lurdes Ramon  Sent: 6/19/2020  10:17 AM PDT  To: [facility]  Subject: Prescription Question                            Hi Dr. Frias and staff.      I hope you are all doing well.  I have been trying since Sunday to get a refill for my \"Estradiol 0.1 MG Patch, Twice Weekly,\" the ones in the pink box and the blue box ones don't work.   [facility] [address] says that have sent the refill request over to your office twice, and I also asked via [facility] under \"Prescriptions.\"  I have used these for many years with no problems.  If [facility] is out of the office, is there another way to get this refilled?    Thank you very much!  Lurdes"
Number Of Injections (Will Not Render If 0): 0
Detail Level: Simple
Additional Notes: Patient consent was obtained to proceed with the visit and recommended plan of care after discussion of all risks and benefits, including the risks of COVID-19 exposure.

## 2023-02-24 DIAGNOSIS — G89.29 CHRONIC BILATERAL LOW BACK PAIN WITH RIGHT-SIDED SCIATICA: ICD-10-CM

## 2023-02-24 DIAGNOSIS — M54.41 CHRONIC BILATERAL LOW BACK PAIN WITH RIGHT-SIDED SCIATICA: ICD-10-CM

## 2023-02-24 RX ORDER — HYDROCODONE BITARTRATE AND ACETAMINOPHEN 5; 325 MG/1; MG/1
1.5 TABLET ORAL EVERY 8 HOURS PRN
Qty: 45 TABLET | Refills: 0 | Status: SHIPPED | OUTPATIENT
Start: 2023-02-24 | End: 2023-03-27 | Stop reason: SDUPTHER

## 2023-02-24 NOTE — TELEPHONE ENCOUNTER
Received request via: Patient     Was the patient seen in the last year in this department? Yes    Does the patient have an active prescription (recently filled or refills available) for medication(s) requested? No    Does the patient have care home Plus and need 100 day supply (blood pressure, diabetes and cholesterol meds only)? Patient does not have SCP

## 2023-03-15 RX ORDER — ESTRADIOL 1 MG/1
2 TABLET ORAL DAILY
Qty: 90 TABLET | Refills: 1 | Status: SHIPPED | OUTPATIENT
Start: 2023-03-15 | End: 2023-05-16 | Stop reason: SDUPTHER

## 2023-03-27 DIAGNOSIS — M54.41 CHRONIC BILATERAL LOW BACK PAIN WITH RIGHT-SIDED SCIATICA: ICD-10-CM

## 2023-03-27 DIAGNOSIS — G89.29 CHRONIC BILATERAL LOW BACK PAIN WITH RIGHT-SIDED SCIATICA: ICD-10-CM

## 2023-03-28 RX ORDER — HYDROCODONE BITARTRATE AND ACETAMINOPHEN 5; 325 MG/1; MG/1
1.5 TABLET ORAL EVERY 8 HOURS PRN
Qty: 45 TABLET | Refills: 0 | Status: SHIPPED | OUTPATIENT
Start: 2023-03-28 | End: 2023-04-25 | Stop reason: SDUPTHER

## 2023-03-28 NOTE — TELEPHONE ENCOUNTER
Received request via: Patient    Was the patient seen in the last year in this department? Yes    Does the patient have an active prescription (recently filled or refills available) for medication(s) requested? No    Does the patient have shelter Plus and need 100 day supply (blood pressure, diabetes and cholesterol meds only)? Medication is not for cholesterol, blood pressure or diabetes

## 2023-04-08 DIAGNOSIS — B00.1 COLD SORE: ICD-10-CM

## 2023-04-11 RX ORDER — VALACYCLOVIR HYDROCHLORIDE 1 G/1
TABLET, FILM COATED ORAL
Qty: 20 TABLET | Refills: 5 | Status: SHIPPED | OUTPATIENT
Start: 2023-04-11 | End: 2023-05-26 | Stop reason: SDUPTHER

## 2023-04-19 DIAGNOSIS — F51.01 PRIMARY INSOMNIA: ICD-10-CM

## 2023-04-19 RX ORDER — ZOLPIDEM TARTRATE 10 MG/1
10 TABLET ORAL NIGHTLY PRN
Qty: 30 TABLET | Refills: 0 | Status: SHIPPED | OUTPATIENT
Start: 2023-04-19 | End: 2023-05-16 | Stop reason: SDUPTHER

## 2023-04-25 DIAGNOSIS — M54.41 CHRONIC BILATERAL LOW BACK PAIN WITH RIGHT-SIDED SCIATICA: ICD-10-CM

## 2023-04-25 DIAGNOSIS — G89.29 CHRONIC BILATERAL LOW BACK PAIN WITH RIGHT-SIDED SCIATICA: ICD-10-CM

## 2023-04-25 RX ORDER — HYDROCODONE BITARTRATE AND ACETAMINOPHEN 5; 325 MG/1; MG/1
1.5 TABLET ORAL EVERY 8 HOURS PRN
Qty: 45 TABLET | Refills: 0 | Status: SHIPPED | OUTPATIENT
Start: 2023-04-25 | End: 2023-05-22 | Stop reason: SDUPTHER

## 2023-05-12 ENCOUNTER — OFFICE VISIT (OUTPATIENT)
Dept: MEDICAL GROUP | Facility: PHYSICIAN GROUP | Age: 68
End: 2023-05-12
Payer: COMMERCIAL

## 2023-05-12 VITALS
WEIGHT: 183 LBS | DIASTOLIC BLOOD PRESSURE: 80 MMHG | OXYGEN SATURATION: 97 % | BODY MASS INDEX: 29.41 KG/M2 | SYSTOLIC BLOOD PRESSURE: 128 MMHG | RESPIRATION RATE: 16 BRPM | HEIGHT: 66 IN | TEMPERATURE: 97.8 F | HEART RATE: 84 BPM

## 2023-05-12 DIAGNOSIS — G89.28 OTHER CHRONIC POSTPROCEDURAL PAIN: ICD-10-CM

## 2023-05-12 DIAGNOSIS — Z12.31 ENCOUNTER FOR SCREENING MAMMOGRAM FOR MALIGNANT NEOPLASM OF BREAST: ICD-10-CM

## 2023-05-12 DIAGNOSIS — Z79.890 HORMONE REPLACEMENT THERAPY (HRT): ICD-10-CM

## 2023-05-12 DIAGNOSIS — G56.21 ULNAR TUNNEL SYNDROME OF RIGHT WRIST: ICD-10-CM

## 2023-05-12 DIAGNOSIS — J30.1 SEASONAL ALLERGIC RHINITIS DUE TO POLLEN: ICD-10-CM

## 2023-05-12 PROCEDURE — 1125F AMNT PAIN NOTED PAIN PRSNT: CPT | Performed by: FAMILY MEDICINE

## 2023-05-12 PROCEDURE — 99213 OFFICE O/P EST LOW 20 MIN: CPT | Performed by: FAMILY MEDICINE

## 2023-05-12 PROCEDURE — 3079F DIAST BP 80-89 MM HG: CPT | Performed by: FAMILY MEDICINE

## 2023-05-12 PROCEDURE — 3074F SYST BP LT 130 MM HG: CPT | Performed by: FAMILY MEDICINE

## 2023-05-12 ASSESSMENT — FIBROSIS 4 INDEX: FIB4 SCORE: 1.12

## 2023-05-12 NOTE — ASSESSMENT & PLAN NOTE
This is a chronic problem.  We talked about the requirement to come in every 3 months for her pain medication.  Her pain contract and urine drug screen were done in November so those only need to be done once a year.

## 2023-05-12 NOTE — ASSESSMENT & PLAN NOTE
This is a chronic problem.  Patient states she is doing much better on her estradiol pill.  She is much happier with that as we are unable to get the hormonal patch as that worked for her.

## 2023-05-12 NOTE — PROGRESS NOTES
Subjective:     CC: Here for several issues.    HPI:   Lurdes presents today with the following medical concerns:    Ulnar tunnel syndrome of right wrist  This is a chronic problem.  Patient states she is having progressive troubles with pain often in the medial aspect of the right palm sometimes it extends up her forearm as well.  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  She also thinks she has had a little bit of weakness in her right hand.  She like to have it investigated further.    Seasonal allergic rhinitis due to pollen  This is a chronic problem.  Patient been having troubles with a mucoid drainage through her nose.  We talked about allergies and treatment options.  She will use over-the-counter antihistamines and steroid nasal spray.    Other chronic postprocedural pain  This is a chronic problem.  We talked about the requirement to come in every 3 months for her pain medication.  Her pain contract and urine drug screen were done in November so those only need to be done once a year.    Hormone replacement therapy (HRT)  This is a chronic problem.  Patient states she is doing much better on her estradiol pill.  She is much happier with that as we are unable to get the hormonal patch as that worked for her.    Encounter for screening mammogram for malignant neoplasm of breast  This is a screening issue.  She is due for her mammogram and that will be ordered.    Past Medical History:   Diagnosis Date    Back pain     Insomnia        Social History     Tobacco Use    Smoking status: Former     Packs/day: 1.00     Years: 15.00     Pack years: 15.00     Types: Cigarettes     Quit date: 10/26/1996     Years since quittin.5    Smokeless tobacco: Never   Vaping Use    Vaping Use: Never used   Substance Use Topics    Alcohol use: Yes     Comment: 4 times yearly    Drug use: No       Current Outpatient Medications Ordered in Epic  "  Medication Sig Dispense Refill    HYDROcodone-acetaminophen (NORCO) 5-325 MG Tab per tablet Take 1.5 Tablets by mouth every 8 hours as needed (pain) for up to 30 days. 45 Tablet 0    zolpidem (AMBIEN) 10 MG Tab Take 1 Tablet by mouth at bedtime as needed for Sleep for up to 30 days. 30 Tablet 0    valacyclovir (VALTREX) 1 GM Tab 2 po bid for 1 day prn outbreak 20 Tablet 5    estradiol (ESTRACE) 1 MG Tab Take 2 Tablets by mouth every day. 90 Tablet 1    calcium carbonate (TUMS) 500 MG Chew Tab Chew 500 mg every day.      Cholecalciferol (VITAMIN D PO) Take  by mouth.      Multiple Vitamin (MULTI-VITAMINS) Tab Take 1 tablet by mouth.       No current Epic-ordered facility-administered medications on file.       Allergies:  Pcn [penicillins], Sulfa drugs, and Clindamycin    Health Maintenance: Completed    ROS:  Gen: no fevers/chills, no changes in weight  Eyes: no changes in vision  ENT: no sore throat, no hearing loss, no bloody nose  Pulm: no sob, no cough  CV: no chest pain, no palpitations  GI: no nausea/vomiting, no diarrhea  : no dysuria  MSk: no myalgias  Skin: no rash  Neuro: no headaches, no numbness/tingling  Heme/Lymph: no easy bruising      Objective:       Exam:  /80 (BP Location: Right arm, Patient Position: Sitting, BP Cuff Size: Adult)   Pulse 84   Temp 36.6 °C (97.8 °F) (Temporal)   Resp 16   Ht 1.676 m (5' 6\")   Wt 83 kg (183 lb)   SpO2 97%   BMI 29.54 kg/m²  Body mass index is 29.54 kg/m².    Gen: Alert and oriented, No apparent distress.  Ext: No clubbing, cyanosis, edema.  There is no muscle atrophy seen on the right palm but the left one does look smaller than the right.  She is right-handed.        Assessment & Plan:     68 y.o. female with the following -     1. Encounter for screening mammogram for malignant neoplasm of breast  This is a screening issue.  Mammogram ordered.  This is a chronic problem.  This patient's symptoms are progressing I told her she needs to have " evaluation by orthopedics to see if this is ulnar tunnel syndrome or not.  - MA-SCREENING MAMMO BILAT W/TOMOSYNTHESIS W/CAD; Future    2. Ulnar tunnel syndrome of right wrist  She is to use the wrist brace when she sleeping at night.  She also could try oral B6 to see if that helps.  - Referral to Orthopedics    3. Seasonal allergic rhinitis due to pollen  This is a chronic problem.  We discussed over-the-counter antihistamines and steroid nasal use.    4. Hormone replacement therapy (HRT)  This is a chronic problem.  She is doing much better on her oral pills.  Continue current treatment.    5. Other chronic postprocedural pain  This is a chronic problem.  We discussed having appointments every 3 months to continue to get her opioid prescription refilled.  And then once a year we need a new pain contract and urine drug screen.    29 minutes spent with the patient talking about all her issues.  Return in about 3 months (around 8/12/2023) for Long.    Please note that this dictation was created using voice recognition software. I have made every reasonable attempt to correct obvious errors, but I expect that there are errors of grammar and possibly content that I did not discover before finalizing the note.

## 2023-05-12 NOTE — ASSESSMENT & PLAN NOTE
This is a chronic problem.  Patient been having troubles with a mucoid drainage through her nose.  We talked about allergies and treatment options.  She will use over-the-counter antihistamines and steroid nasal spray.

## 2023-05-12 NOTE — ASSESSMENT & PLAN NOTE
This is a chronic problem.  Patient states she is having progressive troubles with pain often in the medial aspect of the right palm sometimes it extends up her forearm as well.  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  She also thinks she has had a little bit of weakness in her right hand.  She like to have it investigated further.

## 2023-05-16 DIAGNOSIS — F51.01 PRIMARY INSOMNIA: ICD-10-CM

## 2023-05-16 RX ORDER — ESTRADIOL 1 MG/1
2 TABLET ORAL DAILY
Qty: 90 TABLET | Refills: 3 | Status: SHIPPED | OUTPATIENT
Start: 2023-05-16 | End: 2023-10-31

## 2023-05-16 RX ORDER — ZOLPIDEM TARTRATE 10 MG/1
10 TABLET ORAL NIGHTLY PRN
Qty: 30 TABLET | Refills: 0 | Status: SHIPPED | OUTPATIENT
Start: 2023-05-16 | End: 2023-06-16 | Stop reason: SDUPTHER

## 2023-05-22 DIAGNOSIS — M54.41 CHRONIC BILATERAL LOW BACK PAIN WITH RIGHT-SIDED SCIATICA: ICD-10-CM

## 2023-05-22 DIAGNOSIS — G89.29 CHRONIC BILATERAL LOW BACK PAIN WITH RIGHT-SIDED SCIATICA: ICD-10-CM

## 2023-05-23 RX ORDER — HYDROCODONE BITARTRATE AND ACETAMINOPHEN 5; 325 MG/1; MG/1
1.5 TABLET ORAL EVERY 8 HOURS PRN
Qty: 45 TABLET | Refills: 0 | Status: SHIPPED | OUTPATIENT
Start: 2023-05-23 | End: 2023-06-20 | Stop reason: SDUPTHER

## 2023-05-25 ENCOUNTER — HOSPITAL ENCOUNTER (OUTPATIENT)
Dept: RADIOLOGY | Facility: MEDICAL CENTER | Age: 68
End: 2023-05-25
Attending: FAMILY MEDICINE
Payer: COMMERCIAL

## 2023-05-25 DIAGNOSIS — Z12.31 ENCOUNTER FOR SCREENING MAMMOGRAM FOR MALIGNANT NEOPLASM OF BREAST: ICD-10-CM

## 2023-05-25 PROCEDURE — 77063 BREAST TOMOSYNTHESIS BI: CPT

## 2023-05-26 DIAGNOSIS — B00.1 COLD SORE: ICD-10-CM

## 2023-05-26 RX ORDER — VALACYCLOVIR HYDROCHLORIDE 1 G/1
TABLET, FILM COATED ORAL
Qty: 20 TABLET | Refills: 5 | Status: SHIPPED | OUTPATIENT
Start: 2023-05-26 | End: 2024-03-11 | Stop reason: SDUPTHER

## 2023-05-26 NOTE — TELEPHONE ENCOUNTER
These need to be the dark blue pills.  Also, this refill request should go to Dr. Rogelio Lindo III, my primary care physician. Thank you!

## 2023-06-16 DIAGNOSIS — F51.01 PRIMARY INSOMNIA: ICD-10-CM

## 2023-06-16 RX ORDER — ZOLPIDEM TARTRATE 10 MG/1
10 TABLET ORAL NIGHTLY PRN
Qty: 30 TABLET | Refills: 0 | Status: SHIPPED | OUTPATIENT
Start: 2023-06-16 | End: 2023-07-17 | Stop reason: SDUPTHER

## 2023-06-20 DIAGNOSIS — G89.29 CHRONIC BILATERAL LOW BACK PAIN WITH RIGHT-SIDED SCIATICA: ICD-10-CM

## 2023-06-20 DIAGNOSIS — M54.41 CHRONIC BILATERAL LOW BACK PAIN WITH RIGHT-SIDED SCIATICA: ICD-10-CM

## 2023-06-20 DIAGNOSIS — Z00.00 WELLNESS EXAMINATION: ICD-10-CM

## 2023-06-20 RX ORDER — HYDROCODONE BITARTRATE AND ACETAMINOPHEN 5; 325 MG/1; MG/1
1.5 TABLET ORAL EVERY 8 HOURS PRN
Qty: 45 TABLET | Refills: 0 | Status: SHIPPED | OUTPATIENT
Start: 2023-06-20 | End: 2023-07-18 | Stop reason: SDUPTHER

## 2023-07-14 ENCOUNTER — HOSPITAL ENCOUNTER (OUTPATIENT)
Dept: LAB | Facility: MEDICAL CENTER | Age: 68
End: 2023-07-14
Attending: FAMILY MEDICINE
Payer: COMMERCIAL

## 2023-07-14 DIAGNOSIS — Z00.00 WELLNESS EXAMINATION: ICD-10-CM

## 2023-07-14 LAB
ALBUMIN SERPL BCP-MCNC: 4.2 G/DL (ref 3.2–4.9)
ALBUMIN/GLOB SERPL: 1.4 G/DL
ALP SERPL-CCNC: 82 U/L (ref 30–99)
ALT SERPL-CCNC: 13 U/L (ref 2–50)
ANION GAP SERPL CALC-SCNC: 10 MMOL/L (ref 7–16)
APPEARANCE UR: CLEAR
AST SERPL-CCNC: 15 U/L (ref 12–45)
BASOPHILS # BLD AUTO: 0.7 % (ref 0–1.8)
BASOPHILS # BLD: 0.08 K/UL (ref 0–0.12)
BILIRUB SERPL-MCNC: 0.5 MG/DL (ref 0.1–1.5)
BILIRUB UR QL STRIP.AUTO: NEGATIVE
BUN SERPL-MCNC: 12 MG/DL (ref 8–22)
CALCIUM ALBUM COR SERPL-MCNC: 8.8 MG/DL (ref 8.5–10.5)
CALCIUM SERPL-MCNC: 9 MG/DL (ref 8.5–10.5)
CHLORIDE SERPL-SCNC: 101 MMOL/L (ref 96–112)
CHOLEST SERPL-MCNC: 163 MG/DL (ref 100–199)
CO2 SERPL-SCNC: 26 MMOL/L (ref 20–33)
COLOR UR: YELLOW
CREAT SERPL-MCNC: 0.58 MG/DL (ref 0.5–1.4)
EOSINOPHIL # BLD AUTO: 0.05 K/UL (ref 0–0.51)
EOSINOPHIL NFR BLD: 0.5 % (ref 0–6.9)
ERYTHROCYTE [DISTWIDTH] IN BLOOD BY AUTOMATED COUNT: 42.8 FL (ref 35.9–50)
FASTING STATUS PATIENT QL REPORTED: NORMAL
GFR SERPLBLD CREATININE-BSD FMLA CKD-EPI: 98 ML/MIN/1.73 M 2
GLOBULIN SER CALC-MCNC: 3.1 G/DL (ref 1.9–3.5)
GLUCOSE SERPL-MCNC: 91 MG/DL (ref 65–99)
GLUCOSE UR STRIP.AUTO-MCNC: NEGATIVE MG/DL
HCT VFR BLD AUTO: 45.2 % (ref 37–47)
HDLC SERPL-MCNC: 54 MG/DL
HGB BLD-MCNC: 15 G/DL (ref 12–16)
IMM GRANULOCYTES # BLD AUTO: 0.03 K/UL (ref 0–0.11)
IMM GRANULOCYTES NFR BLD AUTO: 0.3 % (ref 0–0.9)
KETONES UR STRIP.AUTO-MCNC: NEGATIVE MG/DL
LDLC SERPL CALC-MCNC: 87 MG/DL
LEUKOCYTE ESTERASE UR QL STRIP.AUTO: NEGATIVE
LYMPHOCYTES # BLD AUTO: 1.86 K/UL (ref 1–4.8)
LYMPHOCYTES NFR BLD: 16.8 % (ref 22–41)
MCH RBC QN AUTO: 31.8 PG (ref 27–33)
MCHC RBC AUTO-ENTMCNC: 33.2 G/DL (ref 32.2–35.5)
MCV RBC AUTO: 96 FL (ref 81.4–97.8)
MICRO URNS: NORMAL
MONOCYTES # BLD AUTO: 0.71 K/UL (ref 0–0.85)
MONOCYTES NFR BLD AUTO: 6.4 % (ref 0–13.4)
NEUTROPHILS # BLD AUTO: 8.36 K/UL (ref 1.82–7.42)
NEUTROPHILS NFR BLD: 75.3 % (ref 44–72)
NITRITE UR QL STRIP.AUTO: NEGATIVE
NRBC # BLD AUTO: 0 K/UL
NRBC BLD-RTO: 0 /100 WBC (ref 0–0.2)
PH UR STRIP.AUTO: 7 [PH] (ref 5–8)
PLATELET # BLD AUTO: 271 K/UL (ref 164–446)
PMV BLD AUTO: 11.1 FL (ref 9–12.9)
POTASSIUM SERPL-SCNC: 4.6 MMOL/L (ref 3.6–5.5)
PROT SERPL-MCNC: 7.3 G/DL (ref 6–8.2)
PROT UR QL STRIP: NEGATIVE MG/DL
RBC # BLD AUTO: 4.71 M/UL (ref 4.2–5.4)
RBC UR QL AUTO: NEGATIVE
SODIUM SERPL-SCNC: 137 MMOL/L (ref 135–145)
SP GR UR STRIP.AUTO: 1.01
TRIGL SERPL-MCNC: 112 MG/DL (ref 0–149)
UROBILINOGEN UR STRIP.AUTO-MCNC: 0.2 MG/DL
WBC # BLD AUTO: 11.1 K/UL (ref 4.8–10.8)

## 2023-07-14 PROCEDURE — 36415 COLL VENOUS BLD VENIPUNCTURE: CPT

## 2023-07-14 PROCEDURE — 80053 COMPREHEN METABOLIC PANEL: CPT

## 2023-07-14 PROCEDURE — 81003 URINALYSIS AUTO W/O SCOPE: CPT

## 2023-07-14 PROCEDURE — 82607 VITAMIN B-12: CPT

## 2023-07-14 PROCEDURE — 85025 COMPLETE CBC W/AUTO DIFF WBC: CPT

## 2023-07-14 PROCEDURE — 80061 LIPID PANEL: CPT

## 2023-07-15 LAB — VIT B12 SERPL-MCNC: 418 PG/ML (ref 211–911)

## 2023-07-17 DIAGNOSIS — F51.01 PRIMARY INSOMNIA: ICD-10-CM

## 2023-07-18 DIAGNOSIS — M54.41 CHRONIC BILATERAL LOW BACK PAIN WITH RIGHT-SIDED SCIATICA: ICD-10-CM

## 2023-07-18 DIAGNOSIS — G89.29 CHRONIC BILATERAL LOW BACK PAIN WITH RIGHT-SIDED SCIATICA: ICD-10-CM

## 2023-07-18 RX ORDER — ZOLPIDEM TARTRATE 10 MG/1
10 TABLET ORAL NIGHTLY PRN
Qty: 30 TABLET | Refills: 0 | Status: SHIPPED | OUTPATIENT
Start: 2023-07-18 | End: 2023-08-17 | Stop reason: SDUPTHER

## 2023-07-18 RX ORDER — HYDROCODONE BITARTRATE AND ACETAMINOPHEN 5; 325 MG/1; MG/1
1.5 TABLET ORAL EVERY 8 HOURS PRN
Qty: 45 TABLET | Refills: 0 | Status: SHIPPED | OUTPATIENT
Start: 2023-07-20 | End: 2023-08-17 | Stop reason: SDUPTHER

## 2023-07-20 ENCOUNTER — OFFICE VISIT (OUTPATIENT)
Dept: MEDICAL GROUP | Facility: PHYSICIAN GROUP | Age: 68
End: 2023-07-20
Payer: COMMERCIAL

## 2023-07-20 VITALS
OXYGEN SATURATION: 96 % | HEIGHT: 66 IN | HEART RATE: 80 BPM | WEIGHT: 182.6 LBS | TEMPERATURE: 97.8 F | SYSTOLIC BLOOD PRESSURE: 134 MMHG | DIASTOLIC BLOOD PRESSURE: 76 MMHG | RESPIRATION RATE: 18 BRPM | BODY MASS INDEX: 29.35 KG/M2

## 2023-07-20 DIAGNOSIS — F41.9 ANXIETY: ICD-10-CM

## 2023-07-20 DIAGNOSIS — M25.531 RIGHT WRIST PAIN: ICD-10-CM

## 2023-07-20 DIAGNOSIS — R00.2 PALPITATION: ICD-10-CM

## 2023-07-20 DIAGNOSIS — R42 VERTIGO: ICD-10-CM

## 2023-07-20 DIAGNOSIS — R89.9 ABNORMAL LABORATORY TEST: ICD-10-CM

## 2023-07-20 PROBLEM — Z12.31 ENCOUNTER FOR SCREENING MAMMOGRAM FOR MALIGNANT NEOPLASM OF BREAST: Status: RESOLVED | Noted: 2023-05-12 | Resolved: 2023-07-20

## 2023-07-20 PROBLEM — E66.811 OBESITY (BMI 30.0-34.9): Status: RESOLVED | Noted: 2022-05-12 | Resolved: 2023-07-20

## 2023-07-20 PROBLEM — E66.9 OBESITY (BMI 30.0-34.9): Status: RESOLVED | Noted: 2022-05-12 | Resolved: 2023-07-20

## 2023-07-20 PROCEDURE — 3075F SYST BP GE 130 - 139MM HG: CPT | Performed by: FAMILY MEDICINE

## 2023-07-20 PROCEDURE — 3078F DIAST BP <80 MM HG: CPT | Performed by: FAMILY MEDICINE

## 2023-07-20 PROCEDURE — 99215 OFFICE O/P EST HI 40 MIN: CPT | Performed by: FAMILY MEDICINE

## 2023-07-20 RX ORDER — ALPRAZOLAM 0.5 MG/1
0.5 TABLET ORAL 3 TIMES DAILY PRN
Qty: 15 TABLET | Refills: 0 | Status: SHIPPED | OUTPATIENT
Start: 2023-07-20 | End: 2023-07-30

## 2023-07-20 RX ORDER — CYANOCOBALAMIN (VITAMIN B-12) 1000 MCG
TABLET ORAL
COMMUNITY

## 2023-07-20 RX ORDER — LORATADINE 10 MG/1
10 TABLET ORAL DAILY
COMMUNITY

## 2023-07-20 ASSESSMENT — FIBROSIS 4 INDEX: FIB4 SCORE: 1.043900738283641522

## 2023-07-20 NOTE — PROGRESS NOTES
Subjective:     CC: Here for a list of issues.    HPI:   Lurdes presents today with the following medical concerns:    Abnormal laboratory test  This is a new issue.  Patient is here to go over the results of her lab test.  Her white blood cell count was 0.3 above normal and she was concerned that that would mean cancer.  I discussed with her that that is a very low variation from normal and not significant.  We will follow that.  Also her vitamin D level was in the low normal range.  I recommend she take supplementation for that.  And the rest of her labs looked good.  We went over anything in detail.    Right wrist pain  This is a now a chronic problem.  [facility].  They did an x-ray that showed her wrist appeared normal.  They are recommending EMG studies to rule out carpal tunnel and cubital tunnel syndrome.  She is not sure if she wants to do that at this time probably because of insurance and she is not sure she would even do surgery if something was found.  She was also told to wear wrist braces at night and she is starting to do that.    Palpitation  This is a chronic rare problem.  Patient states on June 5 she had an episode where she was tried on different close in her bedroom and suddenly felt her heart beating very fast.  She started to feel faint so lay down on her bed.  She states that a fast heartbeat lasted 1 to 2 minutes and then resolved.  She did not pass out completely.  She had this several years ago and states a full cardiac work-up was unremarkable but she is not sure if she did a Holter monitor or not.  She did get a little nervous and anxious when her heart started beating faster.    Vertigo  This is a chronic problem.  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  She uses Epley maneuvers for it and that generally helps.  She also has a prescription for alprazolam to use for resistant symptoms and would like to have a refill  on that as she uses it very rarely.    Anxiety  This is a chronic problem.  Patient does have a lot of anxiety over the risk of health issues.  She also has anxiety over flying and is due to see her son in [place] in September.  She canceled the trip she supposed to go on tomorrow and due to her anxiety.  She would like to have a refill on her Xanax that she uses very sparingly for when she does fly.    Past Medical History:   Diagnosis Date    Back pain     Insomnia        Social History     Tobacco Use    Smoking status: Former     Packs/day: 1.00     Years: 15.00     Pack years: 15.00     Types: Cigarettes     Quit date: 10/26/1996     Years since quittin.7    Smokeless tobacco: Never   Vaping Use    Vaping Use: Never used   Substance Use Topics    Alcohol use: Yes     Comment: 4 times yearly    Drug use: No       Current Outpatient Medications Ordered in Epic   Medication Sig Dispense Refill    Cyanocobalamin (B-12) 1000 MCG Tab Take  by mouth.      loratadine (CLARITIN) 10 MG Tab Take 10 mg by mouth every day.      Naproxen Sodium (ALEVE PO) Take  by mouth.      ALPRAZolam (XANAX) 0.5 MG Tab Take 1 Tablet by mouth 3 times a day as needed for Anxiety for up to 10 days. 15 Tablet 0    zolpidem (AMBIEN) 10 MG Tab Take 1 Tablet by mouth at bedtime as needed for Sleep for up to 30 days. 30 Tablet 0    HYDROcodone-acetaminophen (NORCO) 5-325 MG Tab per tablet Take 1.5 Tablets by mouth every 8 hours as needed (pain) for up to 30 days. 45 Tablet 0    valacyclovir (VALTREX) 1 GM Tab 2 po bid for 1 day prn outbreak 20 Tablet 5    estradiol (ESTRACE) 1 MG Tab Take 2 Tablets by mouth every day. 90 Tablet 3    calcium carbonate (TUMS) 500 MG Chew Tab Chew 500 mg every day.      Cholecalciferol (VITAMIN D PO) Take 25 mcg by mouth every day.      Multiple Vitamin (MULTI-VITAMINS) Tab Take 1 tablet by mouth.       No current Epic-ordered facility-administered medications on file.       Allergies:  Pcn [penicillins],  "Sulfa drugs, and Clindamycin    Health Maintenance: Completed    ROS:  Gen: no fevers/chills, no changes in weight  Eyes: no changes in vision  ENT: no sore throat, no hearing loss, no bloody nose  Pulm: no sob, no cough  CV: no chest pain,   GI: no nausea/vomiting, no diarrhea  : no dysuria  MSk: no myalgias  Skin: no rash  Neuro: no headaches, no numbness/tingling  Heme/Lymph: no easy bruising      Objective:       Exam:  /76 (BP Location: Right arm, Patient Position: Sitting, BP Cuff Size: Adult)   Pulse 80   Temp 36.6 °C (97.8 °F) (Temporal)   Resp 18   Ht 1.676 m (5' 6\")   Wt 82.8 kg (182 lb 9.6 oz)   SpO2 96%   BMI 29.47 kg/m²  Body mass index is 29.47 kg/m².    Gen: Alert and oriented, No apparent distress.  Neck: Neck is supple without lymphadenopathy.  Lungs: Normal effort,   CV: Pulses normal and regular.  Ext: No clubbing, cyanosis, edema.  Neuro: No lateralizing signs are seen.    Labs: Reviewed with patient    Assessment & Plan:     68 y.o. female with the following -     1. Palpitation  This is a infrequent problem.  I discussed with patient the type of arrhythmias we worry about.  If she has any more frequent recurrences of a fast or irregular heartbeat with syncopal or near syncopal episodes we need to know so work-up can be done.    2. Anxiety  This is a chronic problem.  We discussed her flight anxiety as well as how anxiety over health issues can trigger palpitations.  A renewal on her alprazolam was given for plane trips and severe anxiety.  - ALPRAZolam (XANAX) 0.5 MG Tab; Take 1 Tablet by mouth 3 times a day as needed for Anxiety for up to 10 days.  Dispense: 15 Tablet; Refill: 0    3. Vertigo  This is a chronic problem.  Continue to follow.  We discussed continuing to do her Epley maneuvers when needed.    4. Right wrist pain  This is a new problem.  We discussed the evaluation [facility].  She is going to wait on doing the EMGs for now.  She is to use the wrist braces as "  suggested.    5. Abnormal laboratory test  These were discussed in detail and she was told no significant findings are seen.  She should take over-the-counter vitamin B12 supplementation.    48 minutes was spent in the exam room with the patient discussing all of her issues and concerns.  Return in about 3 months (around 10/20/2023) for Long.    Please note that this dictation was created using voice recognition software. I have made every reasonable attempt to correct obvious errors, but I expect that there are errors of grammar and possibly content that I did not discover before finalizing the note.

## 2023-07-20 NOTE — ASSESSMENT & PLAN NOTE
This is a now a chronic problem.  She did [facility].  They did an x-ray that showed her wrist appeared normal.  They are recommending EMG studies to rule out carpal tunnel and cubital tunnel syndrome.  She is not sure if she wants to do that at this time probably because of insurance and she is not sure she would even do surgery if something was found.  She was also told to wear wrist braces at night and she is starting to do that.

## 2023-07-20 NOTE — ASSESSMENT & PLAN NOTE
This is a chronic problem.  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  She uses Epley maneuvers for it and that generally helps.  She also has a prescription for alprazolam to use for resistant symptoms and would like to have a refill on that as she uses it very rarely.

## 2023-07-20 NOTE — ASSESSMENT & PLAN NOTE
This is a chronic rare problem.  Patient states on June 5 she had an episode where she was tried on different close in her bedroom and suddenly felt her heart beating very fast.  She started to feel faint so lay down on her bed.  She states that a fast heartbeat lasted 1 to 2 minutes and then resolved.  She did not pass out completely.  She had this several years ago and states a full cardiac work-up was unremarkable but she is not sure if she did a Holter monitor or not.  She did get a little nervous and anxious when her heart started beating faster.

## 2023-07-20 NOTE — ASSESSMENT & PLAN NOTE
This is a chronic problem.  Patient does have a lot of anxiety over the risk of health issues.  She also has anxiety over flying and is due to see her son in [place] in September.  She canceled the trip she supposed to go on tomorrow and due to her anxiety.  She would like to have a refill on her Xanax that she uses very sparingly for when she does fly.

## 2023-07-20 NOTE — ASSESSMENT & PLAN NOTE
This is a new issue.  Patient is here to go over the results of her lab test.  Her white blood cell count was 0.3 above normal and she was concerned that that would mean cancer.  I discussed with her that that is a very low variation from normal and not significant.  We will follow that.  Also her vitamin D level was in the low normal range.  I recommend she take supplementation for that.  And the rest of her labs looked good.  We went over anything in detail.

## 2023-07-30 ENCOUNTER — HOSPITAL ENCOUNTER (OUTPATIENT)
Facility: MEDICAL CENTER | Age: 68
End: 2023-07-30
Attending: FAMILY MEDICINE
Payer: COMMERCIAL

## 2023-07-30 ENCOUNTER — OFFICE VISIT (OUTPATIENT)
Dept: URGENT CARE | Facility: PHYSICIAN GROUP | Age: 68
End: 2023-07-30
Payer: COMMERCIAL

## 2023-07-30 VITALS
HEIGHT: 66 IN | WEIGHT: 182.32 LBS | TEMPERATURE: 97.9 F | DIASTOLIC BLOOD PRESSURE: 74 MMHG | BODY MASS INDEX: 29.3 KG/M2 | OXYGEN SATURATION: 94 % | HEART RATE: 81 BPM | RESPIRATION RATE: 16 BRPM | SYSTOLIC BLOOD PRESSURE: 136 MMHG

## 2023-07-30 DIAGNOSIS — N30.00 ACUTE CYSTITIS WITHOUT HEMATURIA: ICD-10-CM

## 2023-07-30 LAB
APPEARANCE UR: NORMAL
BILIRUB UR STRIP-MCNC: NEGATIVE MG/DL
COLOR UR AUTO: NORMAL
GLUCOSE UR STRIP.AUTO-MCNC: NEGATIVE MG/DL
KETONES UR STRIP.AUTO-MCNC: NEGATIVE MG/DL
LEUKOCYTE ESTERASE UR QL STRIP.AUTO: NEGATIVE
NITRITE UR QL STRIP.AUTO: NEGATIVE
PH UR STRIP.AUTO: 6 [PH] (ref 5–8)
PROT UR QL STRIP: NEGATIVE MG/DL
RBC UR QL AUTO: NORMAL
SP GR UR STRIP.AUTO: 1.01
UROBILINOGEN UR STRIP-MCNC: 0.2 MG/DL

## 2023-07-30 PROCEDURE — 81002 URINALYSIS NONAUTO W/O SCOPE: CPT | Performed by: FAMILY MEDICINE

## 2023-07-30 PROCEDURE — 99213 OFFICE O/P EST LOW 20 MIN: CPT | Performed by: FAMILY MEDICINE

## 2023-07-30 PROCEDURE — 87086 URINE CULTURE/COLONY COUNT: CPT

## 2023-07-30 PROCEDURE — 3078F DIAST BP <80 MM HG: CPT | Performed by: FAMILY MEDICINE

## 2023-07-30 PROCEDURE — 3075F SYST BP GE 130 - 139MM HG: CPT | Performed by: FAMILY MEDICINE

## 2023-07-30 RX ORDER — NITROFURANTOIN 25; 75 MG/1; MG/1
100 CAPSULE ORAL 2 TIMES DAILY
Qty: 10 CAPSULE | Refills: 0 | Status: SHIPPED | OUTPATIENT
Start: 2023-07-30 | End: 2023-08-04

## 2023-07-30 ASSESSMENT — FIBROSIS 4 INDEX: FIB4 SCORE: 1.043900738283641522

## 2023-07-30 NOTE — PROGRESS NOTES
Subjective:      CC:  presents with Dysuria            Dysuria   This is a new problem. The current episode started in the past 2 days. The problem occurs every urination. The problem has been unchanged. The quality of the pain is described as burning. There has been no fever. Pt is not sexually active. There is no history of pyelonephritis. Associated symptoms include frequency and urgency. Pertinent negatives include no chills, discharge, flank pain, nausea or vomiting. Pt has tried nothing for the symptoms. There is no history of recurrent UTIs.     Social History     Socioeconomic History    Marital status:      Spouse name: Not on file    Number of children: Not on file    Years of education: Not on file    Highest education level: Master's degree (e.g., MA, MS, Juileth, MEd, MSW, BRIJESH)   Occupational History    Not on file   Tobacco Use    Smoking status: Former     Packs/day: 1.00     Years: 15.00     Pack years: 15.00     Types: Cigarettes     Quit date: 10/26/1996     Years since quittin.7    Smokeless tobacco: Never   Vaping Use    Vaping Use: Never used   Substance and Sexual Activity    Alcohol use: Yes     Comment: 4 times yearly    Drug use: No    Sexual activity: Not Currently   Other Topics Concern    Not on file   Social History Narrative    Not on file     Social Determinants of Health     Financial Resource Strain: Medium Risk (2022)    Overall Financial Resource Strain (CARDIA)     Difficulty of Paying Living Expenses: Somewhat hard   Food Insecurity: Food Insecurity Present (2022)    Hunger Vital Sign     Worried About Running Out of Food in the Last Year: Sometimes true     Ran Out of Food in the Last Year: Never true   Transportation Needs: No Transportation Needs (2022)    PRAPARE - Transportation     Lack of Transportation (Medical): No     Lack of Transportation (Non-Medical): No   Physical Activity: Inactive (2022)    Exercise Vital Sign     Days of  Exercise per Week: 0 days     Minutes of Exercise per Session: 10 min   Stress: Stress Concern Present (5/11/2022)    [employer] [place] [employer] - Occupational Stress Questionnaire     Feeling of Stress : To some extent   Social Connections: Moderately Isolated (5/11/2022)    Social Connection and Isolation Panel [NHANES]     Frequency of Communication with Friends and Family: Twice a week     Frequency of Social Gatherings with Friends and Family: Once a week     Attends Alevism Services: Never     Active Member of Clubs or Organizations: Yes     Attends Club or Organization Meetings: Never     Marital Status:    Intimate Partner Violence: Not on file   Housing Stability: Low Risk  (5/11/2022)    Housing Stability Vital Sign     Unable to Pay for Housing in the Last Year: No     Number of Places Lived in the Last Year: 1     Unstable Housing in the Last Year: No         Family History   Problem Relation Age of Onset    Cancer Sister 58        colorectal    Heart Attack Father     Heart Disease Brother 61    Cancer Mother         brain         Allergies   Allergen Reactions    Pcn [Penicillins] Hives    Sulfa Drugs Hives    Clindamycin Shortness of Breath and Swelling           Current Outpatient Medications on File Prior to Visit   Medication Sig Dispense Refill    Cyanocobalamin (B-12) 1000 MCG Tab Take  by mouth.      loratadine (CLARITIN) 10 MG Tab Take 10 mg by mouth every day.      Naproxen Sodium (ALEVE PO) Take  by mouth.      ALPRAZolam (XANAX) 0.5 MG Tab Take 1 Tablet by mouth 3 times a day as needed for Anxiety for up to 10 days. 15 Tablet 0    zolpidem (AMBIEN) 10 MG Tab Take 1 Tablet by mouth at bedtime as needed for Sleep for up to 30 days. 30 Tablet 0    HYDROcodone-acetaminophen (NORCO) 5-325 MG Tab per tablet Take 1.5 Tablets by mouth every 8 hours as needed (pain) for up to 30 days. 45 Tablet 0    valacyclovir (VALTREX) 1 GM Tab 2 po bid for 1 day prn outbreak 20 Tablet 5     "estradiol (ESTRACE) 1 MG Tab Take 2 Tablets by mouth every day. 90 Tablet 3    calcium carbonate (TUMS) 500 MG Chew Tab Chew 500 mg every day.      Cholecalciferol (VITAMIN D PO) Take 25 mcg by mouth every day.      Multiple Vitamin (MULTI-VITAMINS) Tab Take 1 tablet by mouth.       No current facility-administered medications on file prior to visit.       Review of Systems   Constitutional: Negative for chills.   Respiratory: Negative for shortness of breath.    Cardiovascular: Negative for chest pain.   Gastrointestinal: Negative for nausea, vomiting and abdominal pain.   Genitourinary: Positive for dysuria, urgency and frequency. Negative for flank pain.   Skin: Negative for rash.   Neurological: Negative for dizziness and headaches.   All other systems reviewed and are negative.         Objective:      /74 (BP Location: Right arm, Patient Position: Sitting, BP Cuff Size: Adult long)   Pulse 81   Temp 36.6 °C (97.9 °F) (Temporal)   Resp 16   Ht 1.676 m (5' 6\")   Wt 82.7 kg (182 lb 5.1 oz)   SpO2 94%       Physical Exam   Constitutional: pt is oriented to person, place, and time. Pt appears well-developed and well-nourished. No distress.   HENT:   Head: Normocephalic and atraumatic.   Mouth/Throat: Mucous membranes are normal.   Eyes: Conjunctivae and EOM are normal. Pupils are equal, round, and reactive to light. Right eye exhibits no discharge. Left eye exhibits no discharge. No scleral icterus.   Neck: Normal range of motion. Neck supple.   Cardiovascular: Normal rate, regular rhythm, normal heart sounds and intact distal pulses.    No murmur heard.  Pulmonary/Chest: Effort normal and breath sounds normal. No respiratory distress. Pt has no wheezes,  rales.   Abdominal: Bowel sounds are normal. Pt exhibits no distension and no mass. There is no tenderness. There is no rebound, no guarding and no CVA tenderness.   Neurological: pt is alert and oriented to person, place, and time.   Skin: Skin is warm " and dry.   Psychiatric: behavior is normal.   Nursing note and vitals reviewed.           Lab Results   Component Value Date/Time    POCCOLOR LIGHT YELLOW 07/30/2023 10:58 AM    POCAPPEAR CLOUDY 07/30/2023 10:58 AM    POCLEUKEST NEGATIVE 07/30/2023 10:58 AM    POCNITRITE NEGATIVE 07/30/2023 10:58 AM    POCUROBILIGE 0.2 07/30/2023 10:58 AM    POCPROTEIN NEGATIVE 07/30/2023 10:58 AM    POCURPH 6.0 07/30/2023 10:58 AM    POCBLOOD SMALL 07/30/2023 10:58 AM    POCSPGRV 1.010 07/30/2023 10:58 AM    POCKETONES NEGATIVE 07/30/2023 10:58 AM    POCBILIRUBIN NEGATIVE 07/30/2023 10:58 AM    POCGLUCUA NEGATIVE 07/30/2023 10:58 AM            Assessment/Plan:     1. Acute cystitis without hematuria  UA findings c/w cystitis      - URINE CULTURE(NEW); Future    -pt has sulfa allergy     nitrofurantoin (MACROBID) 100 MG Cap; Take 1 Capsule by mouth 2 times a day for 5 days.  Dispense: 10 Capsule; Refill: 0      Differential diagnosis, natural history, supportive care, and indications for immediate follow-up discussed. All questions answered. Patient agrees with the plan of care.     Follow-up as needed if symptoms worsen or fail to improve to PCP, Urgent care or Emergency Room.     I have personally reviewed prior external notes and test results pertinent to today's visit.  I have independently reviewed and interpreted all diagnostics ordered during this urgent care acute visit.

## 2023-07-31 DIAGNOSIS — N30.00 ACUTE CYSTITIS WITHOUT HEMATURIA: ICD-10-CM

## 2023-08-01 LAB
AMBIGUOUS DTTM AMBI4: NORMAL
SIGNIFICANT IND 70042: NORMAL
SITE SITE: NORMAL
SOURCE SOURCE: NORMAL

## 2023-08-03 LAB
BACTERIA UR CULT: NORMAL
SIGNIFICANT IND 70042: NORMAL
SITE SITE: NORMAL
SOURCE SOURCE: NORMAL

## 2023-08-07 ENCOUNTER — HOSPITAL ENCOUNTER (OUTPATIENT)
Facility: MEDICAL CENTER | Age: 68
End: 2023-08-07
Attending: NURSE PRACTITIONER
Payer: COMMERCIAL

## 2023-08-07 ENCOUNTER — OFFICE VISIT (OUTPATIENT)
Dept: URGENT CARE | Facility: PHYSICIAN GROUP | Age: 68
End: 2023-08-07
Payer: COMMERCIAL

## 2023-08-07 VITALS
HEART RATE: 88 BPM | TEMPERATURE: 97.1 F | SYSTOLIC BLOOD PRESSURE: 140 MMHG | RESPIRATION RATE: 18 BRPM | OXYGEN SATURATION: 95 % | BODY MASS INDEX: 29.25 KG/M2 | WEIGHT: 182 LBS | HEIGHT: 66 IN | DIASTOLIC BLOOD PRESSURE: 70 MMHG

## 2023-08-07 DIAGNOSIS — R39.9 UTI SYMPTOMS: ICD-10-CM

## 2023-08-07 LAB
APPEARANCE UR: CLEAR
BILIRUB UR STRIP-MCNC: NEGATIVE MG/DL
COLOR UR AUTO: YELLOW
GLUCOSE UR STRIP.AUTO-MCNC: NEGATIVE MG/DL
KETONES UR STRIP.AUTO-MCNC: NEGATIVE MG/DL
LEUKOCYTE ESTERASE UR QL STRIP.AUTO: NEGATIVE
NITRITE UR QL STRIP.AUTO: NEGATIVE
PH UR STRIP.AUTO: 6 [PH] (ref 5–8)
PROT UR QL STRIP: NEGATIVE MG/DL
RBC UR QL AUTO: NORMAL
SP GR UR STRIP.AUTO: <=1.005
UROBILINOGEN UR STRIP-MCNC: 0.2 MG/DL

## 2023-08-07 PROCEDURE — 99213 OFFICE O/P EST LOW 20 MIN: CPT | Performed by: NURSE PRACTITIONER

## 2023-08-07 PROCEDURE — 3078F DIAST BP <80 MM HG: CPT | Performed by: NURSE PRACTITIONER

## 2023-08-07 PROCEDURE — 81002 URINALYSIS NONAUTO W/O SCOPE: CPT | Performed by: NURSE PRACTITIONER

## 2023-08-07 PROCEDURE — 87086 URINE CULTURE/COLONY COUNT: CPT

## 2023-08-07 PROCEDURE — 3077F SYST BP >= 140 MM HG: CPT | Performed by: NURSE PRACTITIONER

## 2023-08-07 RX ORDER — NITROFURANTOIN 25; 75 MG/1; MG/1
100 CAPSULE ORAL 2 TIMES DAILY
Qty: 10 CAPSULE | Refills: 0 | Status: SHIPPED | OUTPATIENT
Start: 2023-08-07 | End: 2023-08-12

## 2023-08-07 ASSESSMENT — ENCOUNTER SYMPTOMS
BACK PAIN: 0
FLANK PAIN: 0
CHILLS: 0
ABDOMINAL PAIN: 0
CONSTITUTIONAL NEGATIVE: 1
FEVER: 0

## 2023-08-07 ASSESSMENT — FIBROSIS 4 INDEX: FIB4 SCORE: 1.043900738283641522

## 2023-08-07 ASSESSMENT — VISUAL ACUITY: OU: 1

## 2023-08-07 NOTE — PROGRESS NOTES
Subjective:     Lurdes Ramon is a 68 y.o. female who presents for Other (Was seen 1 week ago for bladder infection, was on antibiotics and felt better but recenlty come back again)       UTI  This is a new problem. The problem has been gradually worsening. Pertinent negatives include no abdominal pain, chills or fever.     Patient seen in urgent care 7/30/2023.  Was having urinary symptoms.  Treated with Macrobid for UTI.    Patient reports finishing medication and symptoms resolved.  However, symptoms coming back again.    Urine culture with no growth per lab report, however, patient reports advised by other provider of positive UTI with E. Coli; brings printed copy.    Review of Systems   Constitutional: Negative.  Negative for chills, fever and malaise/fatigue.   Gastrointestinal:  Negative for abdominal pain.   Genitourinary:  Positive for frequency and urgency. Negative for dysuria and flank pain.        Suprapubic discomfort   Musculoskeletal:  Negative for back pain.   All other systems reviewed and are negative.    Refer to HPI for additional details.    During this visit, appropriate PPE was worn, and hand hygiene was performed.    PMH:  has a past medical history of Back pain and Insomnia.    MEDS:   Current Outpatient Medications:     nitrofurantoin (MACROBID) 100 MG Cap, Take 1 Capsule by mouth 2 times a day for 5 days., Disp: 10 Capsule, Rfl: 0    Cyanocobalamin (B-12) 1000 MCG Tab, Take  by mouth., Disp: , Rfl:     loratadine (CLARITIN) 10 MG Tab, Take 10 mg by mouth every day., Disp: , Rfl:     Naproxen Sodium (ALEVE PO), Take  by mouth., Disp: , Rfl:     zolpidem (AMBIEN) 10 MG Tab, Take 1 Tablet by mouth at bedtime as needed for Sleep for up to 30 days., Disp: 30 Tablet, Rfl: 0    HYDROcodone-acetaminophen (NORCO) 5-325 MG Tab per tablet, Take 1.5 Tablets by mouth every 8 hours as needed (pain) for up to 30 days., Disp: 45 Tablet, Rfl: 0    valacyclovir (VALTREX) 1 GM Tab, 2 po bid for 1 day  "prn outbreak, Disp: 20 Tablet, Rfl: 5    estradiol (ESTRACE) 1 MG Tab, Take 2 Tablets by mouth every day., Disp: 90 Tablet, Rfl: 3    calcium carbonate (TUMS) 500 MG Chew Tab, Chew 500 mg every day., Disp: , Rfl:     Cholecalciferol (VITAMIN D PO), Take 25 mcg by mouth every day., Disp: , Rfl:     Multiple Vitamin (MULTI-VITAMINS) Tab, Take 1 tablet by mouth., Disp: , Rfl:     ALLERGIES:   Allergies   Allergen Reactions    Pcn [Penicillins] Hives    Sulfa Drugs Hives    Clindamycin Shortness of Breath and Swelling     SURGHX:   Past Surgical History:   Procedure Laterality Date    HYSTERECTOMY, TOTAL ABDOMINAL  1994    DENTAL EXTRACTION(S)      LITHOTRIPSY      OTHER ORTHOPEDIC SURGERY      back surgery x 2     SOCHX:  reports that she quit smoking about 26 years ago. Her smoking use included cigarettes. She has a 15.00 pack-year smoking history. She has never used smokeless tobacco. She reports current alcohol use. She reports that she does not use drugs.    FH: Per HPI as applicable/pertinent.      Objective:     BP (!) 140/70   Pulse 88   Temp 36.2 °C (97.1 °F) (Temporal)   Resp 18   Ht 1.676 m (5' 6\")   Wt 82.6 kg (182 lb)   SpO2 95%   BMI 29.38 kg/m²     Physical Exam  Nursing note reviewed.   Constitutional:       General: She is not in acute distress.     Appearance: She is well-developed. She is not ill-appearing or toxic-appearing.   Eyes:      General: Vision grossly intact.   Cardiovascular:      Rate and Rhythm: Normal rate.   Pulmonary:      Effort: Pulmonary effort is normal. No respiratory distress.   Abdominal:      Palpations: Abdomen is soft.      Tenderness: There is abdominal tenderness in the suprapubic area.   Musculoskeletal:         General: No deformity. Normal range of motion.   Skin:     Coloration: Skin is not pale.   Neurological:      Mental Status: She is alert and oriented to person, place, and time.      Motor: No weakness.   Psychiatric:         Behavior: Behavior normal. " Behavior is cooperative.     UA: trace blood      Assessment/Plan:     1. UTI symptoms  - POCT Urinalysis  - URINE CULTURE(NEW); Future  - nitrofurantoin (MACROBID) 100 MG Cap; Take 1 Capsule by mouth 2 times a day for 5 days.  Dispense: 10 Capsule; Refill: 0    Patient amenable to 2nd round of antibiotics. Rx as above sent electronically. Increase fluids.    Monitor. Alternative noninfectious causes of cystitis discussed. Mutually defer urology referral at this time. However, consider if symptoms persistent/recurrent. Return precautions advised.     Differential diagnosis, natural history, supportive care, over-the-counter symptom management per 's instructions, close monitoring, and indications for immediate follow-up discussed.     All questions answered. Patient agrees with the plan of care.    Discharge summary provided via [employer].

## 2023-08-10 LAB
BACTERIA UR CULT: NORMAL
SIGNIFICANT IND 70042: NORMAL
SITE SITE: NORMAL
SOURCE SOURCE: NORMAL

## 2023-08-17 DIAGNOSIS — F51.01 PRIMARY INSOMNIA: ICD-10-CM

## 2023-08-17 DIAGNOSIS — M54.41 CHRONIC BILATERAL LOW BACK PAIN WITH RIGHT-SIDED SCIATICA: ICD-10-CM

## 2023-08-17 DIAGNOSIS — G89.29 CHRONIC BILATERAL LOW BACK PAIN WITH RIGHT-SIDED SCIATICA: ICD-10-CM

## 2023-08-18 RX ORDER — ZOLPIDEM TARTRATE 10 MG/1
10 TABLET ORAL NIGHTLY PRN
Qty: 30 TABLET | Refills: 0 | Status: SHIPPED | OUTPATIENT
Start: 2023-08-18 | End: 2023-09-19 | Stop reason: SDUPTHER

## 2023-08-18 RX ORDER — HYDROCODONE BITARTRATE AND ACETAMINOPHEN 5; 325 MG/1; MG/1
1.5 TABLET ORAL EVERY 8 HOURS PRN
Qty: 45 TABLET | Refills: 0 | Status: SHIPPED | OUTPATIENT
Start: 2023-08-18 | End: 2023-09-19 | Stop reason: SDUPTHER

## 2023-09-19 ENCOUNTER — PATIENT MESSAGE (OUTPATIENT)
Dept: MEDICAL GROUP | Facility: PHYSICIAN GROUP | Age: 68
End: 2023-09-19
Payer: COMMERCIAL

## 2023-09-19 DIAGNOSIS — M54.41 CHRONIC BILATERAL LOW BACK PAIN WITH RIGHT-SIDED SCIATICA: ICD-10-CM

## 2023-09-19 DIAGNOSIS — F51.01 PRIMARY INSOMNIA: ICD-10-CM

## 2023-09-19 DIAGNOSIS — F41.9 ANXIETY: ICD-10-CM

## 2023-09-19 DIAGNOSIS — G89.29 CHRONIC BILATERAL LOW BACK PAIN WITH RIGHT-SIDED SCIATICA: ICD-10-CM

## 2023-09-19 RX ORDER — ZOLPIDEM TARTRATE 10 MG/1
10 TABLET ORAL NIGHTLY PRN
Qty: 30 TABLET | Refills: 0 | Status: SHIPPED | OUTPATIENT
Start: 2023-09-19 | End: 2023-10-18 | Stop reason: SDUPTHER

## 2023-09-19 RX ORDER — HYDROCODONE BITARTRATE AND ACETAMINOPHEN 5; 325 MG/1; MG/1
1.5 TABLET ORAL EVERY 8 HOURS PRN
Qty: 45 TABLET | Refills: 0 | Status: SHIPPED | OUTPATIENT
Start: 2023-09-19 | End: 2023-10-18 | Stop reason: SDUPTHER

## 2023-10-10 ENCOUNTER — OFFICE VISIT (OUTPATIENT)
Dept: MEDICAL GROUP | Facility: PHYSICIAN GROUP | Age: 68
End: 2023-10-10
Payer: COMMERCIAL

## 2023-10-10 VITALS
OXYGEN SATURATION: 96 % | HEIGHT: 66 IN | HEART RATE: 86 BPM | BODY MASS INDEX: 29.25 KG/M2 | SYSTOLIC BLOOD PRESSURE: 132 MMHG | DIASTOLIC BLOOD PRESSURE: 74 MMHG | WEIGHT: 182 LBS | RESPIRATION RATE: 18 BRPM | TEMPERATURE: 97.6 F

## 2023-10-10 DIAGNOSIS — Z79.890 HORMONE REPLACEMENT THERAPY (HRT): ICD-10-CM

## 2023-10-10 DIAGNOSIS — F51.01 PRIMARY INSOMNIA: ICD-10-CM

## 2023-10-10 DIAGNOSIS — M54.41 CHRONIC BILATERAL LOW BACK PAIN WITH RIGHT-SIDED SCIATICA: ICD-10-CM

## 2023-10-10 DIAGNOSIS — G89.29 CHRONIC BILATERAL LOW BACK PAIN WITH RIGHT-SIDED SCIATICA: ICD-10-CM

## 2023-10-10 PROBLEM — R89.9 ABNORMAL LABORATORY TEST: Status: RESOLVED | Noted: 2023-07-20 | Resolved: 2023-10-10

## 2023-10-10 PROBLEM — R10.2 PELVIC PAIN: Status: RESOLVED | Noted: 2022-09-23 | Resolved: 2023-10-10

## 2023-10-10 PROCEDURE — 3075F SYST BP GE 130 - 139MM HG: CPT | Performed by: FAMILY MEDICINE

## 2023-10-10 PROCEDURE — 3078F DIAST BP <80 MM HG: CPT | Performed by: FAMILY MEDICINE

## 2023-10-10 PROCEDURE — 99213 OFFICE O/P EST LOW 20 MIN: CPT | Performed by: FAMILY MEDICINE

## 2023-10-10 RX ORDER — ALPRAZOLAM 0.5 MG/1
0.5 TABLET ORAL NIGHTLY PRN
COMMUNITY
End: 2024-03-26

## 2023-10-10 RX ORDER — CONJUGATED ESTROGENS 0.62 MG/G
0.5 CREAM VAGINAL
Qty: 30 G | Refills: 3 | Status: SHIPPED | OUTPATIENT
Start: 2023-10-10

## 2023-10-10 ASSESSMENT — FIBROSIS 4 INDEX: FIB4 SCORE: 1.043900738283641522

## 2023-10-10 NOTE — ASSESSMENT & PLAN NOTE
This is a chronic problem.  Patient states her hot flashes are well controlled on her oral estrogen treatment but she has been getting frequent vaginal yeast infections.  After discussion I told her we will try vaginal estrogen cream twice a week and see if that helps with this problem.

## 2023-10-10 NOTE — PROGRESS NOTES
Subjective:     CC: Here for follow-up on her medications and other issues.    HPI:   Lurdes presents today with the following medical concerns:    Insomnia  This is a chronic problem.  Patient is here for 3-month follow-up.  She is doing well on the current dose of medications.  It will be due to be renewed next week.    Chronic bilateral low back pain with right-sided sciatica  This is a chronic problem.  Patient is here for 3-month follow-up.  Her substance agreement form will need to be signed at the next visit as well as a urine drug screen will be ordered.  Medication is due to be refilled next week.    Hormone replacement therapy (HRT)  This is a chronic problem.  Patient states her hot flashes are well controlled on her oral estrogen treatment but she has been getting frequent vaginal yeast infections.  After discussion I told her we will try vaginal estrogen cream twice a week and see if that helps with this problem.    Past Medical History:   Diagnosis Date    Back pain     Insomnia        Social History     Tobacco Use    Smoking status: Former     Current packs/day: 0.00     Average packs/day: 1 pack/day for 15.0 years (15.0 ttl pk-yrs)     Types: Cigarettes     Start date: 10/26/1981     Quit date: 10/26/1996     Years since quittin.9    Smokeless tobacco: Never   Vaping Use    Vaping Use: Never used   Substance Use Topics    Alcohol use: Yes     Comment: 4 times yearly    Drug use: No       Current Outpatient Medications Ordered in Epic   Medication Sig Dispense Refill    ALPRAZolam (XANAX) 0.5 MG Tab Take 0.5 mg by mouth at bedtime as needed for Sleep. Prescribed 2023      estrogens, conjugated (PREMARIN) 0.625 MG/GM Cream Insert 0.5 g into the vagina two times a week. 30 g 3    HYDROcodone-acetaminophen (NORCO) 5-325 MG Tab per tablet Take 1.5 Tablets by mouth every 8 hours as needed (pain) for up to 30 days. 45 Tablet 0    zolpidem (AMBIEN) 10 MG Tab Take 1 Tablet by mouth at bedtime as needed  "for Sleep for up to 30 days. 30 Tablet 0    Cyanocobalamin (B-12) 1000 MCG Tab Take  by mouth.      loratadine (CLARITIN) 10 MG Tab Take 10 mg by mouth every day.      Naproxen Sodium (ALEVE PO) Take  by mouth.      valacyclovir (VALTREX) 1 GM Tab 2 po bid for 1 day prn outbreak 20 Tablet 5    estradiol (ESTRACE) 1 MG Tab Take 2 Tablets by mouth every day. 90 Tablet 3    calcium carbonate (TUMS) 500 MG Chew Tab Chew 500 mg every day.      Cholecalciferol (VITAMIN D PO) Take 25 mcg by mouth every day.      Multiple Vitamin (MULTI-VITAMINS) Tab Take 1 tablet by mouth.       No current Epic-ordered facility-administered medications on file.       Allergies:  Pcn [penicillins], Sulfa drugs, and Clindamycin    Health Maintenance: Completed    ROS:  Gen: no fevers/chills, no changes in weight  Eyes: no changes in vision  ENT: no sore throat, no hearing loss, no bloody nose  Pulm: no sob, no cough  CV: no chest pain, no palpitations  GI: no nausea/vomiting, no diarrhea  : no dysuria  MSk: no myalgias  Skin: no rash  Neuro: no headaches, no numbness/tingling  Heme/Lymph: no easy bruising      Objective:       Exam:  /74 (BP Location: Right arm, Patient Position: Sitting, BP Cuff Size: Adult)   Pulse 86   Temp 36.4 °C (97.6 °F) (Temporal)   Resp 18   Ht 1.676 m (5' 6\")   Wt 82.6 kg (182 lb)   SpO2 96%   BMI 29.38 kg/m²  Body mass index is 29.38 kg/m².    Gen: Alert and oriented, No apparent distress.  Neck: Neck is supple without lymphadenopathy.  Ext: No clubbing, cyanosis, edema.        Assessment & Plan:     68 y.o. female with the following -     1. Hormone replacement therapy (HRT)  This is a chronic problem.  We will see if the vaginal estrogen helps with her recurrent yeast infections.  She is to report back in a month or 2.    2. Primary insomnia  This is a chronic stable condition.  Patient is to return in 3 months.  When her prescription is due next week I will renew it for her.    3. Chronic " bilateral low back pain with right-sided sciatica  This is a chronic problem.  At her next visit we will do a renewal on her contract as well as a urine drug screen.  Medicines will be renewed next week when they are due.      Return in about 3 months (around 1/10/2024) for Long.    Please note that this dictation was created using voice recognition software. I have made every reasonable attempt to correct obvious errors, but I expect that there are errors of grammar and possibly content that I did not discover before finalizing the note.

## 2023-10-10 NOTE — ASSESSMENT & PLAN NOTE
This is a chronic problem.  Patient is here for 3-month follow-up.  She is doing well on the current dose of medications.  It will be due to be renewed next week.

## 2023-10-18 DIAGNOSIS — G89.29 CHRONIC BILATERAL LOW BACK PAIN WITH RIGHT-SIDED SCIATICA: ICD-10-CM

## 2023-10-18 DIAGNOSIS — M54.41 CHRONIC BILATERAL LOW BACK PAIN WITH RIGHT-SIDED SCIATICA: ICD-10-CM

## 2023-10-18 DIAGNOSIS — F51.01 PRIMARY INSOMNIA: ICD-10-CM

## 2023-10-19 RX ORDER — ZOLPIDEM TARTRATE 10 MG/1
10 TABLET ORAL NIGHTLY PRN
Qty: 30 TABLET | Refills: 0 | Status: SHIPPED | OUTPATIENT
Start: 2023-10-19 | End: 2023-11-15 | Stop reason: SDUPTHER

## 2023-10-19 RX ORDER — HYDROCODONE BITARTRATE AND ACETAMINOPHEN 5; 325 MG/1; MG/1
1.5 TABLET ORAL EVERY 8 HOURS PRN
Qty: 45 TABLET | Refills: 0 | Status: SHIPPED | OUTPATIENT
Start: 2023-10-19 | End: 2023-11-08 | Stop reason: SDUPTHER

## 2023-10-31 RX ORDER — ESTRADIOL 1 MG/1
2 TABLET ORAL DAILY
Qty: 180 TABLET | Refills: 3 | Status: SHIPPED | OUTPATIENT
Start: 2023-10-31

## 2023-11-06 ENCOUNTER — OFFICE VISIT (OUTPATIENT)
Dept: URGENT CARE | Facility: PHYSICIAN GROUP | Age: 68
End: 2023-11-06
Payer: COMMERCIAL

## 2023-11-06 ENCOUNTER — HOSPITAL ENCOUNTER (OUTPATIENT)
Facility: MEDICAL CENTER | Age: 68
End: 2023-11-06
Payer: COMMERCIAL

## 2023-11-06 VITALS
DIASTOLIC BLOOD PRESSURE: 70 MMHG | OXYGEN SATURATION: 95 % | TEMPERATURE: 97.8 F | BODY MASS INDEX: 29 KG/M2 | WEIGHT: 179.68 LBS | HEART RATE: 81 BPM | SYSTOLIC BLOOD PRESSURE: 124 MMHG | RESPIRATION RATE: 16 BRPM

## 2023-11-06 DIAGNOSIS — R30.0 DYSURIA: ICD-10-CM

## 2023-11-06 DIAGNOSIS — K64.9 HEMORRHOIDS, UNSPECIFIED HEMORRHOID TYPE: ICD-10-CM

## 2023-11-06 LAB
APPEARANCE UR: NORMAL
BILIRUB UR STRIP-MCNC: NEGATIVE MG/DL
COLOR UR AUTO: YELLOW
GLUCOSE UR STRIP.AUTO-MCNC: NEGATIVE MG/DL
KETONES UR STRIP.AUTO-MCNC: NEGATIVE MG/DL
LEUKOCYTE ESTERASE UR QL STRIP.AUTO: NEGATIVE
NITRITE UR QL STRIP.AUTO: NEGATIVE
PH UR STRIP.AUTO: 6 [PH] (ref 5–8)
PROT UR QL STRIP: NEGATIVE MG/DL
RBC UR QL AUTO: NORMAL
SP GR UR STRIP.AUTO: 1.02
UROBILINOGEN UR STRIP-MCNC: 0.2 MG/DL

## 2023-11-06 PROCEDURE — 99213 OFFICE O/P EST LOW 20 MIN: CPT

## 2023-11-06 PROCEDURE — 87086 URINE CULTURE/COLONY COUNT: CPT

## 2023-11-06 PROCEDURE — 81002 URINALYSIS NONAUTO W/O SCOPE: CPT

## 2023-11-06 PROCEDURE — 3078F DIAST BP <80 MM HG: CPT

## 2023-11-06 PROCEDURE — 3074F SYST BP LT 130 MM HG: CPT

## 2023-11-06 ASSESSMENT — FIBROSIS 4 INDEX: FIB4 SCORE: 1.043900738283641522

## 2023-11-06 NOTE — PROGRESS NOTES
Subjective:   Lurdes Ramon is a 68 y.o. female who presents for Bladder Infection (X a few days and also concern of hemorrhoids and very painful.)      HPI:    Patient presents to UC with concerns of perianal pain and discomfort  Reports constipation and used otc laxatives to help ease her constipation  Reports once the initial blockage was cleared, which was painful and she had BRB with wiping, she experienced recurrent diarrhea  BRB is is no longer present  Endorses tender and palpable rectal lesion  Denies prolapsing rectal mass  Purchased donut pillow, which helps with sitting  Mild improvement with witch hazel pads  Concerned she may have UTI.   Denies dysuria, hematuria, urinary frequency        ROS As above in HPI    Medications:    Current Outpatient Medications on File Prior to Visit   Medication Sig Dispense Refill    estradiol (ESTRACE) 1 MG Tab TAKE 2 TABLETS BY MOUTH EVERY  Tablet 3    zolpidem (AMBIEN) 10 MG Tab Take 1 Tablet by mouth at bedtime as needed for Sleep for up to 30 days. 30 Tablet 0    estrogens, conjugated (PREMARIN) 0.625 MG/GM Cream Insert 0.5 g into the vagina two times a week. 30 g 3    valacyclovir (VALTREX) 1 GM Tab 2 po bid for 1 day prn outbreak 20 Tablet 5    ALPRAZolam (XANAX) 0.5 MG Tab Take 0.5 mg by mouth at bedtime as needed for Sleep. Prescribed 7/2023      Cyanocobalamin (B-12) 1000 MCG Tab Take  by mouth.      loratadine (CLARITIN) 10 MG Tab Take 10 mg by mouth every day.      Naproxen Sodium (ALEVE PO) Take  by mouth.      calcium carbonate (TUMS) 500 MG Chew Tab Chew 500 mg every day.      Cholecalciferol (VITAMIN D PO) Take 25 mcg by mouth every day.      Multiple Vitamin (MULTI-VITAMINS) Tab Take 1 tablet by mouth.       No current facility-administered medications on file prior to visit.        Allergies:   Pcn [penicillins], Sulfa drugs, and Clindamycin    Problem List:   Patient Active Problem List   Diagnosis    Insomnia    Chronic bilateral low  back pain with right-sided sciatica    Anxiety    Hormone replacement therapy (HRT)    Vertigo    Avitaminosis D    Other chronic postprocedural pain    Chronic fatigue    Cervicalgia    Ulnar tunnel syndrome of right wrist    Seasonal allergic rhinitis due to pollen    Palpitation    Right wrist pain    External hemorrhoid, thrombosed        Surgical History:  Past Surgical History:   Procedure Laterality Date    HYSTERECTOMY, TOTAL ABDOMINAL      DENTAL EXTRACTION(S)      LITHOTRIPSY      OTHER ORTHOPEDIC SURGERY      back surgery x 2       Past Social Hx:   Social History     Tobacco Use    Smoking status: Former     Current packs/day: 0.00     Average packs/day: 1 pack/day for 15.0 years (15.0 ttl pk-yrs)     Types: Cigarettes     Start date: 10/26/1981     Quit date: 10/26/1996     Years since quittin.0    Smokeless tobacco: Never   Vaping Use    Vaping Use: Never used   Substance Use Topics    Alcohol use: Yes     Comment: 4 times yearly    Drug use: No          Problem list, medications, and allergies reviewed by myself today in Epic.     Objective:     /70 (BP Location: Right arm, Patient Position: Sitting, BP Cuff Size: Adult)   Pulse 81   Temp 36.6 °C (97.8 °F) (Temporal)   Resp 16   Wt 81.5 kg (179 lb 10.8 oz)   SpO2 95%   BMI 29.00 kg/m²     Physical Exam  Vitals and nursing note reviewed.   Constitutional:       General: She is not in acute distress.     Appearance: Normal appearance. She is not ill-appearing.   Cardiovascular:      Rate and Rhythm: Normal rate and regular rhythm.      Heart sounds: Normal heart sounds. No murmur heard.     No friction rub. No gallop.   Pulmonary:      Breath sounds: Normal breath sounds.   Abdominal:      General: Bowel sounds are normal. There is no distension.      Palpations: Abdomen is soft. There is no mass.      Tenderness: There is no abdominal tenderness. There is no right CVA tenderness, left CVA tenderness, guarding or rebound.       Hernia: No hernia is present.   Genitourinary:     Rectum: Tenderness and external hemorrhoid present.   Neurological:      Mental Status: She is alert.         Assessment/Plan:       Results for orders placed or performed in visit on 11/06/23   POCT Urinalysis   Result Value Ref Range    POC Color Yellow Negative    POC Appearance Slightly Cloudy Negative    POC Glucose Negative Negative mg/dL    POC Bilirubin Negative Negative mg/dL    POC Ketones Negative Negative mg/dL    POC Specific Gravity 1.025 <1.005 - >1.030    POC Blood Moderate Negative    POC Urine PH 6.0 5.0 - 8.0    POC Protein Negative Negative mg/dL    POC Urobiligen 0.2 Negative (0.2) mg/dL    POC Nitrites Negative Negative    POC Leukocyte Esterase Negative Negative       Diagnosis and associated orders:   1. Dysuria  - POCT Urinalysis  - URINE CULTURE(NEW); Future    2. Hemorrhoids, unspecified hemorrhoid type        Comments/MDM:     UA: +blood, -nitrites, -LE  Urine culture ordered  Do not anticipate UTI is present    Patient has large hemorrhoid, tender to palpation, but is not thrombosed.   Dietary and lifestyle modification reviewed, incorporate fiber and fluids  Sitz baths, ibuprofen/Tylenol per package instructions, Preparation H cream/suppositories, Desitin, baby wipes.    Return to UC if symptoms fail to improve.   Follow up with PCP advised.         Please note that this dictation was created using voice recognition software. I have made a reasonable attempt to correct obvious errors, but I expect that there are errors of grammar and possibly content that I did not discover before finalizing the note.    This note was electronically signed by MONICA Beckham

## 2023-11-08 ENCOUNTER — OFFICE VISIT (OUTPATIENT)
Dept: MEDICAL GROUP | Facility: PHYSICIAN GROUP | Age: 68
End: 2023-11-08
Payer: COMMERCIAL

## 2023-11-08 VITALS
TEMPERATURE: 97.6 F | BODY MASS INDEX: 28.61 KG/M2 | RESPIRATION RATE: 18 BRPM | HEART RATE: 84 BPM | DIASTOLIC BLOOD PRESSURE: 88 MMHG | HEIGHT: 66 IN | WEIGHT: 178 LBS | SYSTOLIC BLOOD PRESSURE: 142 MMHG | OXYGEN SATURATION: 96 %

## 2023-11-08 DIAGNOSIS — K64.5 EXTERNAL HEMORRHOID, THROMBOSED: ICD-10-CM

## 2023-11-08 DIAGNOSIS — M54.41 CHRONIC BILATERAL LOW BACK PAIN WITH RIGHT-SIDED SCIATICA: ICD-10-CM

## 2023-11-08 DIAGNOSIS — G89.29 CHRONIC BILATERAL LOW BACK PAIN WITH RIGHT-SIDED SCIATICA: ICD-10-CM

## 2023-11-08 PROCEDURE — 3079F DIAST BP 80-89 MM HG: CPT | Performed by: FAMILY MEDICINE

## 2023-11-08 PROCEDURE — 3077F SYST BP >= 140 MM HG: CPT | Performed by: FAMILY MEDICINE

## 2023-11-08 PROCEDURE — 99213 OFFICE O/P EST LOW 20 MIN: CPT | Performed by: FAMILY MEDICINE

## 2023-11-08 RX ORDER — HYDROCODONE BITARTRATE AND ACETAMINOPHEN 5; 325 MG/1; MG/1
2 TABLET ORAL EVERY 8 HOURS PRN
Qty: 60 TABLET | Refills: 0 | Status: SHIPPED | OUTPATIENT
Start: 2023-11-08 | End: 2023-12-08 | Stop reason: SDUPTHER

## 2023-11-08 RX ORDER — HYDROCORTISONE ACETATE 25 MG
SUPPOSITORY, RECTAL RECTAL
COMMUNITY
Start: 2023-11-08 | End: 2024-03-26

## 2023-11-08 ASSESSMENT — FIBROSIS 4 INDEX: FIB4 SCORE: 1.043900738283641522

## 2023-11-09 LAB
BACTERIA UR CULT: NORMAL
SIGNIFICANT IND 70042: NORMAL
SITE SITE: NORMAL
SOURCE SOURCE: NORMAL

## 2023-11-09 RX ORDER — LIDOCAINE HYDROCHLORIDE 20 MG/ML
1 JELLY TOPICAL 2 TIMES DAILY
Qty: 30 ML | Refills: 0 | Status: SHIPPED | OUTPATIENT
Start: 2023-11-09 | End: 2024-03-26

## 2023-11-09 NOTE — PROGRESS NOTES
Subjective:     CC: Here for follow-up on ER visits for hemorrhoid.    HPI:   Lurdes presents today with the following medical concerns:    External hemorrhoid, thrombosed  This is a new problem.  Patient developed a thrombosed external hemorrhoid several days ago.  It was very painful.  She not going to urgent care who gave her some treatment.  Then she went to the emergency room yesterday due to the severe pain.  They gave her some additional topical medicines to use and is a little better today.  She is never had this trouble before.    Chronic bilateral low back pain with right-sided sciatica  This is a chronic problem.  Patient has used more of her pain medicines to relieve the pain from her external hemorrhoids was asking for a early refill.  She is very compliant with her medications.    Past Medical History:   Diagnosis Date    Back pain     Insomnia        Social History     Tobacco Use    Smoking status: Former     Current packs/day: 0.00     Average packs/day: 1 pack/day for 15.0 years (15.0 ttl pk-yrs)     Types: Cigarettes     Start date: 10/26/1981     Quit date: 10/26/1996     Years since quittin.0    Smokeless tobacco: Never   Vaping Use    Vaping Use: Never used   Substance Use Topics    Alcohol use: Yes     Comment: 4 times yearly    Drug use: No       Current Outpatient Medications Ordered in Epic   Medication Sig Dispense Refill    Lidocaine HCl 3 % Gel Apply  topically.      HYDROcodone-acetaminophen (NORCO) 5-325 MG Tab per tablet Take 2 Tablets by mouth every 8 hours as needed (pain) for up to 30 days. 60 Tablet 0    estradiol (ESTRACE) 1 MG Tab TAKE 2 TABLETS BY MOUTH EVERY  Tablet 3    zolpidem (AMBIEN) 10 MG Tab Take 1 Tablet by mouth at bedtime as needed for Sleep for up to 30 days. 30 Tablet 0    ALPRAZolam (XANAX) 0.5 MG Tab Take 0.5 mg by mouth at bedtime as needed for Sleep. Prescribed 2023      estrogens, conjugated (PREMARIN) 0.625 MG/GM Cream Insert 0.5 g into the vagina  "two times a week. 30 g 3    Cyanocobalamin (B-12) 1000 MCG Tab Take  by mouth.      loratadine (CLARITIN) 10 MG Tab Take 10 mg by mouth every day.      Naproxen Sodium (ALEVE PO) Take  by mouth.      valacyclovir (VALTREX) 1 GM Tab 2 po bid for 1 day prn outbreak 20 Tablet 5    calcium carbonate (TUMS) 500 MG Chew Tab Chew 500 mg every day.      Cholecalciferol (VITAMIN D PO) Take 25 mcg by mouth every day.      Multiple Vitamin (MULTI-VITAMINS) Tab Take 1 tablet by mouth.      ANUCORT-HC 25 MG Suppos        No current Epic-ordered facility-administered medications on file.       Allergies:  Pcn [penicillins], Sulfa drugs, and Clindamycin    Health Maintenance: Completed    ROS:  Gen: no fevers/chills, no changes in weight  Eyes: no changes in vision  ENT: no sore throat, no hearing loss, no bloody nose  Pulm: no sob, no cough  CV: no chest pain, no palpitations  GI: no nausea/vomiting, no diarrhea  : no dysuria  MSk: no myalgias  Skin: no rash  Neuro: no headaches, no numbness/tingling  Heme/Lymph: no easy bruising      Objective:       Exam:  BP (!) 142/88 (BP Location: Left arm, Patient Position: Sitting, BP Cuff Size: Adult)   Pulse 84   Temp 36.4 °C (97.6 °F) (Temporal)   Resp 18   Ht 1.676 m (5' 6\")   Wt 80.7 kg (178 lb)   SpO2 96%   BMI 28.73 kg/m²  Body mass index is 28.73 kg/m².    Gen: Alert and oriented, No apparent distress.      Assessment & Plan:     68 y.o. female with the following -     1. Chronic bilateral low back pain with right-sided sciatica  Chronic problem.  Medication amount will be increased while she is taking it for her hemorrhoidal pain.  Next month we will go back to her usual dosage.  - HYDROcodone-acetaminophen (NORCO) 5-325 MG Tab per tablet; Take 2 Tablets by mouth every 8 hours as needed (pain) for up to 30 days.  Dispense: 60 Tablet; Refill: 0    2. External hemorrhoid, thrombosed  This is a new problem.  I talked to patient about removing the clot to help reduce the pain " and I am surprised that was not done in the emergency room.  We will go ahead and refer her to a surgeon for evaluation for external hemorrhoid thrombectomy or excision of the hemorrhoid.  Point she is feeling a little better with the topical treatment and I told her the hemorrhoid should improve with time.  Continue the Anusol HC or the other steroid preparation they gave her topically.  Also she has a pending prescription for Lidoderm to use externally.  Continue heating pad as well.  Continue stool softeners.  Blood pressure is mildly elevated today likely due to her hemorrhoidal pain.  - Referral to General Surgery      Return if symptoms worsen or fail to improve.    Please note that this dictation was created using voice recognition software. I have made every reasonable attempt to correct obvious errors, but I expect that there are errors of grammar and possibly content that I did not discover before finalizing the note.

## 2023-11-09 NOTE — ASSESSMENT & PLAN NOTE
This is a chronic problem.  Patient has used more of her pain medicines to relieve the pain from her external hemorrhoids was asking for a early refill.  She is very compliant with her medications.

## 2023-11-09 NOTE — ASSESSMENT & PLAN NOTE
This is a new problem.  Patient developed a thrombosed external hemorrhoid several days ago.  It was very painful.  She not going to urgent care who gave her some treatment.  Then she went to the emergency room yesterday due to the severe pain.  They gave her some additional topical medicines to use and is a little better today.  She is never had this trouble before.

## 2023-11-15 DIAGNOSIS — F51.01 PRIMARY INSOMNIA: ICD-10-CM

## 2023-11-16 RX ORDER — ZOLPIDEM TARTRATE 10 MG/1
10 TABLET ORAL NIGHTLY PRN
Qty: 30 TABLET | Refills: 0 | Status: SHIPPED | OUTPATIENT
Start: 2023-11-17 | End: 2023-12-16 | Stop reason: SDUPTHER

## 2023-12-08 ENCOUNTER — PATIENT MESSAGE (OUTPATIENT)
Dept: MEDICAL GROUP | Facility: PHYSICIAN GROUP | Age: 68
End: 2023-12-08
Payer: COMMERCIAL

## 2023-12-08 DIAGNOSIS — M54.41 CHRONIC BILATERAL LOW BACK PAIN WITH RIGHT-SIDED SCIATICA: ICD-10-CM

## 2023-12-08 DIAGNOSIS — G89.29 CHRONIC BILATERAL LOW BACK PAIN WITH RIGHT-SIDED SCIATICA: ICD-10-CM

## 2023-12-08 RX ORDER — HYDROCODONE BITARTRATE AND ACETAMINOPHEN 5; 325 MG/1; MG/1
1.5 TABLET ORAL EVERY 8 HOURS PRN
Qty: 45 TABLET | Refills: 0 | Status: SHIPPED | OUTPATIENT
Start: 2023-12-08 | End: 2024-01-04 | Stop reason: SDUPTHER

## 2023-12-16 DIAGNOSIS — F51.01 PRIMARY INSOMNIA: ICD-10-CM

## 2023-12-18 RX ORDER — ZOLPIDEM TARTRATE 10 MG/1
10 TABLET ORAL NIGHTLY PRN
Qty: 30 TABLET | Refills: 0 | Status: SHIPPED | OUTPATIENT
Start: 2023-12-18 | End: 2024-01-16 | Stop reason: SDUPTHER

## 2024-01-04 ENCOUNTER — OFFICE VISIT (OUTPATIENT)
Dept: MEDICAL GROUP | Facility: PHYSICIAN GROUP | Age: 69
End: 2024-01-04
Payer: COMMERCIAL

## 2024-01-04 VITALS
TEMPERATURE: 98.7 F | OXYGEN SATURATION: 98 % | HEIGHT: 66 IN | SYSTOLIC BLOOD PRESSURE: 124 MMHG | WEIGHT: 180.5 LBS | RESPIRATION RATE: 18 BRPM | BODY MASS INDEX: 29.01 KG/M2 | DIASTOLIC BLOOD PRESSURE: 72 MMHG | HEART RATE: 80 BPM

## 2024-01-04 DIAGNOSIS — G89.29 CHRONIC BILATERAL LOW BACK PAIN WITH RIGHT-SIDED SCIATICA: ICD-10-CM

## 2024-01-04 DIAGNOSIS — K64.5 EXTERNAL HEMORRHOID, THROMBOSED: ICD-10-CM

## 2024-01-04 DIAGNOSIS — F51.01 PRIMARY INSOMNIA: ICD-10-CM

## 2024-01-04 DIAGNOSIS — M54.41 CHRONIC BILATERAL LOW BACK PAIN WITH RIGHT-SIDED SCIATICA: ICD-10-CM

## 2024-01-04 PROCEDURE — 99213 OFFICE O/P EST LOW 20 MIN: CPT | Performed by: FAMILY MEDICINE

## 2024-01-04 PROCEDURE — 3074F SYST BP LT 130 MM HG: CPT | Performed by: FAMILY MEDICINE

## 2024-01-04 PROCEDURE — 3078F DIAST BP <80 MM HG: CPT | Performed by: FAMILY MEDICINE

## 2024-01-04 RX ORDER — HYDROCODONE BITARTRATE AND ACETAMINOPHEN 5; 325 MG/1; MG/1
1.5 TABLET ORAL EVERY 8 HOURS PRN
Qty: 45 TABLET | Refills: 0 | Status: SHIPPED | OUTPATIENT
Start: 2024-01-04 | End: 2024-02-05 | Stop reason: SDUPTHER

## 2024-01-04 ASSESSMENT — FIBROSIS 4 INDEX: FIB4 SCORE: 1.043900738283641522

## 2024-01-04 ASSESSMENT — PATIENT HEALTH QUESTIONNAIRE - PHQ9: CLINICAL INTERPRETATION OF PHQ2 SCORE: 0

## 2024-01-04 NOTE — PROGRESS NOTES
Subjective:     CC: Here for several issues.    HPI:   Lurdes presents today with the following medical concerns:    Chronic bilateral low back pain with right-sided sciatica  This is a chronic problem.  Patient is here for a refill on her medications.  She is also due for a new consent form without a be signed today.  No change in condition.    Insomnia  This is a chronic problem.  Patient is on Ambien to help her sleep.  The prescriptions not due for another 10 days.  We will go ahead and redo her consent form today.    External hemorrhoid, thrombosed  This is a resolved problem.  Patient has some questions about this.  It appears that she was given suppositories as well as external cream.  The suppositories were very expensive.  I told her she does not need those for just external hemorrhoids and that is all that was written on the ER note.  We had talked about referring her to a surgeon to have the external hemorrhoids removed but she is feeling better now and not sure she wants to do that.  She is on a stool softener and encouraged to stay on that.    Past Medical History:   Diagnosis Date    Back pain     Insomnia        Social History     Tobacco Use    Smoking status: Former     Current packs/day: 0.00     Average packs/day: 1 pack/day for 15.0 years (15.0 ttl pk-yrs)     Types: Cigarettes     Start date: 10/26/1981     Quit date: 10/26/1996     Years since quittin.2    Smokeless tobacco: Never   Vaping Use    Vaping Use: Never used   Substance Use Topics    Alcohol use: Yes     Comment: 4 times yearly    Drug use: No       Current Outpatient Medications Ordered in Epic   Medication Sig Dispense Refill    HYDROcodone-acetaminophen (NORCO) 5-325 MG Tab per tablet Take 1.5 Tablets by mouth every 8 hours as needed (pain) for up to 30 days. 45 Tablet 0    zolpidem (AMBIEN) 10 MG Tab Take 1 Tablet by mouth at bedtime as needed for Sleep for up to 30 days. 30 Tablet 0    lidocaine 2 % Gel Apply 1 Application  "topically 2 times a day. Use prn for mod/severe pain 30 mL 0    estradiol (ESTRACE) 1 MG Tab TAKE 2 TABLETS BY MOUTH EVERY  Tablet 3    ALPRAZolam (XANAX) 0.5 MG Tab Take 0.5 mg by mouth at bedtime as needed for Sleep. Prescribed 7/2023      estrogens, conjugated (PREMARIN) 0.625 MG/GM Cream Insert 0.5 g into the vagina two times a week. 30 g 3    Cyanocobalamin (B-12) 1000 MCG Tab Take  by mouth.      Naproxen Sodium (ALEVE PO) Take  by mouth.      valacyclovir (VALTREX) 1 GM Tab 2 po bid for 1 day prn outbreak 20 Tablet 5    calcium carbonate (TUMS) 500 MG Chew Tab Chew 500 mg every day.      Cholecalciferol (VITAMIN D PO) Take 25 mcg by mouth every day.      Multiple Vitamin (MULTI-VITAMINS) Tab Take 1 tablet by mouth.      ANUCORT-HC 25 MG Suppos       loratadine (CLARITIN) 10 MG Tab Take 10 mg by mouth every day.       No current [facility]-ordered facility-administered medications on file.       Allergies:  Pcn [penicillins], Sulfa drugs, and Clindamycin    Health Maintenance: Completed    ROS:  Gen: no fevers/chills, no changes in weight  Eyes: no changes in vision  ENT: no sore throat, no hearing loss, no bloody nose  Pulm: no sob, no cough  CV: no chest pain, no palpitations  GI: no nausea/vomiting, no diarrhea  : no dysuria  MSk: no myalgias  Skin: no rash  Neuro: no headaches, no numbness/tingling  Heme/Lymph: no easy bruising      Objective:       Exam:  /72 (BP Location: Right arm, Patient Position: Sitting, BP Cuff Size: Adult)   Pulse 80   Temp 37.1 °C (98.7 °F) (Temporal)   Resp 18   Ht 1.676 m (5' 6\")   Wt 81.9 kg (180 lb 8 oz)   SpO2 98%   BMI 29.13 kg/m²  Body mass index is 29.13 kg/m².    Gen: Alert and oriented, No apparent distress.  Ext: No clubbing, cyanosis, edema.        Assessment & Plan:     68 y.o. female with the following -     1. Primary insomnia  This is a chronic problem.  Agreement signed.  Prescription be renewed when it comes due in 10 days.  - Controlled " Substance Treatment Agreement  - PAIN MANAGEMENT SCRN, UR; Future    2. Chronic bilateral low back pain with right-sided sciatica  This is a chronic problem.  Annual drug screen ordered.  Medication renewed.  - PAIN MANAGEMENT SCRN, UR; Future  - HYDROcodone-acetaminophen (NORCO) 5-325 MG Tab per tablet; Take 1.5 Tablets by mouth every 8 hours as needed (pain) for up to 30 days.  Dispense: 45 Tablet; Refill: 0    3. External hemorrhoid, thrombosed  This is a resolved problem.  Hemorrhoids discussed.    Annual exam and labs will be due in 6 months.  Return in about 3 months (around 4/4/2024) for Long.    Please note that this dictation was created using voice recognition software. I have made every reasonable attempt to correct obvious errors, but I expect that there are errors of grammar and possibly content that I did not discover before finalizing the note.

## 2024-01-04 NOTE — ASSESSMENT & PLAN NOTE
This is a resolved problem.  Patient has some questions about this.  It appears that she was given suppositories as well as external cream.  The suppositories were very expensive.  I told her she does not need those for just external hemorrhoids and that is all that was written on the ER note.  We had talked about referring her to a surgeon to have the external hemorrhoids removed but she is feeling better now and not sure she wants to do that.  She is on a stool softener and encouraged to stay on that.

## 2024-01-04 NOTE — ASSESSMENT & PLAN NOTE
This is a chronic problem.  Patient is on Ambien to help her sleep.  The prescriptions not due for another 10 days.  We will go ahead and redo her consent form today.

## 2024-01-04 NOTE — ASSESSMENT & PLAN NOTE
This is a chronic problem.  Patient is here for a refill on her medications.  She is also due for a new consent form without a be signed today.  No change in condition.

## 2024-01-10 ENCOUNTER — HOSPITAL ENCOUNTER (OUTPATIENT)
Facility: MEDICAL CENTER | Age: 69
End: 2024-01-10
Attending: FAMILY MEDICINE
Payer: COMMERCIAL

## 2024-01-10 DIAGNOSIS — G89.29 CHRONIC BILATERAL LOW BACK PAIN WITH RIGHT-SIDED SCIATICA: ICD-10-CM

## 2024-01-10 DIAGNOSIS — M54.41 CHRONIC BILATERAL LOW BACK PAIN WITH RIGHT-SIDED SCIATICA: ICD-10-CM

## 2024-01-10 DIAGNOSIS — F51.01 PRIMARY INSOMNIA: ICD-10-CM

## 2024-01-10 PROCEDURE — G0481 DRUG TEST DEF 8-14 CLASSES: HCPCS

## 2024-01-14 LAB
1OH-MIDAZOLAM UR QL SCN: NOT DETECTED
6MAM UR QL: NOT DETECTED
7AMINOCLONAZEPAM UR QL: NOT DETECTED
A-OH ALPRAZ UR QL: NOT DETECTED
ALPRAZ UR QL: NOT DETECTED
AMPHET UR QL SCN: NOT DETECTED
ANNOTATION COMMENT IMP: NORMAL
BARBITURATES UR QL: NEGATIVE
BUPRENORPHINE UR QL: NOT DETECTED
BZE UR QL: NEGATIVE
CARBOXYTHC UR QL: NEGATIVE
CARISOPRODOL UR QL: NEGATIVE
CLONAZEPAM UR QL: NOT DETECTED
CODEINE UR QL: NOT DETECTED
CREAT UR-MCNC: 71 MG/DL (ref 20–400)
DIAZEPAM UR QL: NOT DETECTED
ETHYL GLUCURONIDE UR QL: NEGATIVE
FENTANYL UR QL: NOT DETECTED
GABAPENTIN UR QL CFM: NOT DETECTED
HYDROCODONE UR QL: PRESENT
HYDROMORPHONE UR QL: PRESENT
LORAZEPAM UR QL: NOT DETECTED
MDA UR QL: NOT DETECTED
MDEA UR QL: NOT DETECTED
MDMA UR QL: NOT DETECTED
ME-PHENIDATE UR QL: NOT DETECTED
METHADONE UR QL: NEGATIVE
METHAMPHET UR QL: NOT DETECTED
MIDAZOLAM UR QL SCN: NOT DETECTED
MORPHINE UR QL: NOT DETECTED
NALOXONE UR QL CFM: NOT DETECTED
NORBUPRENORPHINE UR QL CFM: NOT DETECTED
NORDIAZEPAM UR QL: NOT DETECTED
NORFENTANYL UR QL: NOT DETECTED
NORHYDROCODONE UR QL CFM: PRESENT
NORMEPERIDINE UR QL CFM: NOT DETECTED
NOROXYCODONE UR QL CFM: NOT DETECTED
NOROXYMORPHONE UR QL SCN: NOT DETECTED
OXAZEPAM UR QL: NOT DETECTED
OXYCODONE UR QL: NOT DETECTED
OXYMORPHONE UR QL: NOT DETECTED
PATHOLOGY STUDY: NORMAL
PCP UR QL: NEGATIVE
PHENTERMINE UR QL: NOT DETECTED
PREGABALIN UR QL CFM: NOT DETECTED
SERVICE CMNT-IMP: NORMAL
TAPENTADOL UR QL SCN: NOT DETECTED
TAPENTADOL UR QL SCN: NOT DETECTED
TEMAZEPAM UR QL: NOT DETECTED
TRAMADOL UR QL: NEGATIVE
ZOLPIDEM PHENYL-4-CARB UR QL SCN: PRESENT
ZOLPIDEM UR QL: PRESENT

## 2024-01-16 DIAGNOSIS — F51.01 PRIMARY INSOMNIA: ICD-10-CM

## 2024-01-16 RX ORDER — ZOLPIDEM TARTRATE 10 MG/1
10 TABLET ORAL NIGHTLY PRN
Qty: 30 TABLET | Refills: 2 | Status: SHIPPED | OUTPATIENT
Start: 2024-01-16 | End: 2024-03-26 | Stop reason: SDUPTHER

## 2024-02-05 DIAGNOSIS — M54.41 CHRONIC BILATERAL LOW BACK PAIN WITH RIGHT-SIDED SCIATICA: ICD-10-CM

## 2024-02-05 DIAGNOSIS — G89.29 CHRONIC BILATERAL LOW BACK PAIN WITH RIGHT-SIDED SCIATICA: ICD-10-CM

## 2024-02-06 RX ORDER — HYDROCODONE BITARTRATE AND ACETAMINOPHEN 5; 325 MG/1; MG/1
1.5 TABLET ORAL EVERY 8 HOURS PRN
Qty: 45 TABLET | Refills: 0 | Status: SHIPPED | OUTPATIENT
Start: 2024-02-06 | End: 2024-03-05 | Stop reason: SDUPTHER

## 2024-02-06 NOTE — TELEPHONE ENCOUNTER
Received request via: Patient    Was the patient seen in the last year in this department? Yes    Does the patient have an active prescription (recently filled or refills available) for medication(s) requested? No    Pharmacy Name: [facility]    Does the patient have [facility] Plus and need 100 day supply (blood pressure, diabetes and cholesterol meds only)? Patient does not have SCP

## 2024-02-14 DIAGNOSIS — F51.01 PRIMARY INSOMNIA: ICD-10-CM

## 2024-02-14 RX ORDER — ZOLPIDEM TARTRATE 10 MG/1
10 TABLET ORAL NIGHTLY PRN
Qty: 30 TABLET | Refills: 2 | Status: CANCELLED | OUTPATIENT
Start: 2024-02-14 | End: 2024-05-14

## 2024-02-15 NOTE — TELEPHONE ENCOUNTER
Received request via: Patient    Was the patient seen in the last year in this department? Yes    Does the patient have an active prescription (recently filled or refills available) for medication(s) requested? No    Pharmacy Name: : [id number] - [place], [place] [address] [facility]        Does the patient have [facility] Plus and need 100 day supply (blood pressure, diabetes and cholesterol meds only)? Patient does not have SCP

## 2024-03-05 DIAGNOSIS — M54.41 CHRONIC BILATERAL LOW BACK PAIN WITH RIGHT-SIDED SCIATICA: ICD-10-CM

## 2024-03-05 DIAGNOSIS — G89.29 CHRONIC BILATERAL LOW BACK PAIN WITH RIGHT-SIDED SCIATICA: ICD-10-CM

## 2024-03-05 RX ORDER — HYDROCODONE BITARTRATE AND ACETAMINOPHEN 5; 325 MG/1; MG/1
1.5 TABLET ORAL EVERY 8 HOURS PRN
Qty: 45 TABLET | Refills: 0 | Status: SHIPPED | OUTPATIENT
Start: 2024-03-05 | End: 2024-03-26 | Stop reason: SDUPTHER

## 2024-03-05 NOTE — TELEPHONE ENCOUNTER
Received request via: Patient    Was the patient seen in the last year in this department? Yes    Does the patient have an active prescription (recently filled or refills available) for medication(s) requested? No    Pharmacy Name: [facility] #[id number] - [place], [place] [address] [facility] [address] [facility]     Does the patient have [facility] Plus and need 100 day supply (blood pressure, diabetes and cholesterol meds only)? Patient does not have SCP

## 2024-03-11 DIAGNOSIS — B00.1 COLD SORE: ICD-10-CM

## 2024-03-12 RX ORDER — VALACYCLOVIR HYDROCHLORIDE 1 G/1
TABLET, FILM COATED ORAL
Qty: 20 TABLET | Refills: 5 | Status: SHIPPED | OUTPATIENT
Start: 2024-03-12

## 2024-03-12 NOTE — TELEPHONE ENCOUNTER
Received request via: Patient    Was the patient seen in the last year in this department? Yes    Does the patient have an active prescription (recently filled or refills available) for medication(s) requested? No    Pharmacy Name: [facility]     Does the patient have [facility] and need 100 day supply (blood pressure, diabetes and cholesterol meds only)? Patient does not have SCP

## 2024-03-26 ENCOUNTER — OFFICE VISIT (OUTPATIENT)
Dept: MEDICAL GROUP | Facility: PHYSICIAN GROUP | Age: 69
End: 2024-03-26
Payer: COMMERCIAL

## 2024-03-26 VITALS
WEIGHT: 179 LBS | HEART RATE: 84 BPM | SYSTOLIC BLOOD PRESSURE: 122 MMHG | OXYGEN SATURATION: 97 % | HEIGHT: 66 IN | BODY MASS INDEX: 28.77 KG/M2 | RESPIRATION RATE: 18 BRPM | TEMPERATURE: 97.8 F | DIASTOLIC BLOOD PRESSURE: 74 MMHG

## 2024-03-26 DIAGNOSIS — Z00.00 ADULT GENERAL MEDICAL EXAM: ICD-10-CM

## 2024-03-26 DIAGNOSIS — Z79.890 HORMONE REPLACEMENT THERAPY (HRT): ICD-10-CM

## 2024-03-26 DIAGNOSIS — F41.9 ANXIETY: ICD-10-CM

## 2024-03-26 DIAGNOSIS — R53.82 CHRONIC FATIGUE: ICD-10-CM

## 2024-03-26 DIAGNOSIS — E55.9 AVITAMINOSIS D: ICD-10-CM

## 2024-03-26 DIAGNOSIS — G89.29 CHRONIC BILATERAL LOW BACK PAIN WITH RIGHT-SIDED SCIATICA: ICD-10-CM

## 2024-03-26 DIAGNOSIS — E53.8 VITAMIN B12 DEFICIENCY: ICD-10-CM

## 2024-03-26 DIAGNOSIS — M54.41 CHRONIC BILATERAL LOW BACK PAIN WITH RIGHT-SIDED SCIATICA: ICD-10-CM

## 2024-03-26 DIAGNOSIS — F51.01 PRIMARY INSOMNIA: ICD-10-CM

## 2024-03-26 PROCEDURE — 99213 OFFICE O/P EST LOW 20 MIN: CPT | Performed by: FAMILY MEDICINE

## 2024-03-26 PROCEDURE — 3074F SYST BP LT 130 MM HG: CPT | Performed by: FAMILY MEDICINE

## 2024-03-26 PROCEDURE — 3078F DIAST BP <80 MM HG: CPT | Performed by: FAMILY MEDICINE

## 2024-03-26 RX ORDER — HYDROCODONE BITARTRATE AND ACETAMINOPHEN 5; 325 MG/1; MG/1
1.5 TABLET ORAL EVERY 8 HOURS PRN
Qty: 45 TABLET | Refills: 0 | Status: SHIPPED | OUTPATIENT
Start: 2024-04-04 | End: 2024-05-04

## 2024-03-26 RX ORDER — ZOLPIDEM TARTRATE 10 MG/1
10 TABLET ORAL NIGHTLY PRN
Qty: 30 TABLET | Refills: 2 | Status: SHIPPED | OUTPATIENT
Start: 2024-04-15 | End: 2024-07-14

## 2024-03-26 ASSESSMENT — FIBROSIS 4 INDEX: FIB4 SCORE: 1.043900738283641522

## 2024-03-26 NOTE — ASSESSMENT & PLAN NOTE
This is a chronic problem.  Patient had done well for years on an estrogen patch but the had difficulty getting it in with change in formulation and perhaps the clear and the patch was no longer effective or caused side effects.  Have switched her to oral medication and she did better but was still having issues so we added on the estrogen vaginal cream.  She was having frequent recurrent yeast infections until the cream was added.  They are still occurring but not as often.  We talked about this and refer to gynecology to see if they have better suggestions.  When she was off her estrogen she had great difficulty functioning and performing activities of daily living so does not want to go off of the hormones.

## 2024-03-26 NOTE — ASSESSMENT & PLAN NOTE
Patient is here for follow-up on her insomnia.  Her Ambien prescription is not due until next month but will renew it ahead of time for that.  She continues to do well on current dose.  Her urine drug screen in January was positive for Ambien as expected.

## 2024-03-26 NOTE — ASSESSMENT & PLAN NOTE
This is a chronic problem.  Her pain medication is due on April 4 so that will be sent in for that date.  Her urine drug screen was positive for the medication.

## 2024-03-26 NOTE — PROGRESS NOTES
Subjective:     CC: Here for follow-up.    HPI:   Lurdes presents today with the following medical concerns:    Insomnia  Patient is here for follow-up on her insomnia.  Her Ambien prescription is not due until next month but will renew it ahead of time for that.  She continues to do well on current dose.  Her urine drug screen in January was positive for Ambien as expected.    Chronic bilateral low back pain with right-sided sciatica  This is a chronic problem.  Her pain medication is due on  so that will be sent in for that date.  Her urine drug screen was positive for the medication.    Anxiety  This is a chronic problem.  She does use alprazolam when she travels.  Her urine drug screen was negative for that since it is a as needed medication and not use regularly.  I discussed that with her.    Hormone replacement therapy (HRT)  This is a chronic problem.  Patient had done well for years on an estrogen patch but the had difficulty getting it in with change in formulation and perhaps the clear and the patch was no longer effective or caused side effects.  Have switched her to oral medication and she did better but was still having issues so we added on the estrogen vaginal cream.  She was having frequent recurrent yeast infections until the cream was added.  They are still occurring but not as often.  We talked about this and refer to gynecology to see if they have better suggestions.  When she was off her estrogen she had great difficulty functioning and performing activities of daily living so does not want to go off of the hormones.    Past Medical History:   Diagnosis Date    Back pain     Insomnia        Social History     Tobacco Use    Smoking status: Former     Current packs/day: 0.00     Average packs/day: 1 pack/day for 15.0 years (15.0 ttl pk-yrs)     Types: Cigarettes     Start date: 10/26/1981     Quit date: 10/26/1996     Years since quittin.4    Smokeless tobacco: Never   Vaping Use     "Vaping Use: Never used   Substance Use Topics    Alcohol use: Yes     Comment: 4 times yearly    Drug use: No       Current Outpatient Medications Ordered in Epic   Medication Sig Dispense Refill    [START ON 4/4/2024] HYDROcodone-acetaminophen (NORCO) 5-325 MG Tab per tablet Take 1.5 Tablets by mouth every 8 hours as needed (pain) for up to 30 days. 45 Tablet 0    [START ON 4/15/2024] zolpidem (AMBIEN) 10 MG Tab Take 1 Tablet by mouth at bedtime as needed for Sleep for up to 90 days. 30 Tablet 2    valacyclovir (VALTREX) 1 GM Tab 2 po bid for 1 day prn outbreak 20 Tablet 5    estrogens, conjugated (PREMARIN) 0.625 MG/GM Cream Insert 0.5 g into the vagina two times a week. 30 g 3    loratadine (CLARITIN) 10 MG Tab Take 10 mg by mouth every day.      Naproxen Sodium (ALEVE PO) Take  by mouth.      Multiple Vitamin (MULTI-VITAMINS) Tab Take 1 tablet by mouth.      estradiol (ESTRACE) 1 MG Tab TAKE 2 TABLETS BY MOUTH EVERY  Tablet 3    Cyanocobalamin (B-12) 1000 MCG Tab Take  by mouth.      calcium carbonate (TUMS) 500 MG Chew Tab Chew 500 mg every day.      Cholecalciferol (VITAMIN D PO) Take 25 mcg by mouth every day.       No current [facility]-ordered facility-administered medications on file.       Allergies:  Pcn [penicillins], Sulfa drugs, and Clindamycin    Health Maintenance: Completed    ROS:  Gen: no fevers/chills, no changes in weight  Eyes: no changes in vision  ENT: no sore throat, no hearing loss, no bloody nose  Pulm: no sob, no cough  CV: no chest pain, no palpitations  GI: no nausea/vomiting, no diarrhea  : no dysuria  MSk: no myalgias  Skin: no rash  Neuro: no headaches, no numbness/tingling  Heme/Lymph: no easy bruising      Objective:       Exam:  /74 (BP Location: Left arm, Patient Position: Sitting, BP Cuff Size: Adult)   Pulse 84   Temp 36.6 °C (97.8 °F) (Temporal)   Resp 18   Ht 1.676 m (5' 6\")   Wt 81.2 kg (179 lb)   SpO2 97%   BMI 28.89 kg/m²  Body mass index is 28.89 " kg/m².    Gen: Alert and oriented, No apparent distress.  Lungs: Normal effort,   Ext: No clubbing, cyanosis, edema.  Psych: Patient is alert and cooperative.  No unusual thought processes expressed.  Insight and judgment is good.    Labs: Will be ordered before next visit    Assessment & Plan:     68 y.o. female with the following -     1. Hormone replacement therapy (HRT)  This is a chronic problem.  Continue on current medications.  Referral to gynecology to see if they can make some suggestions regarding her current treatment and how to prevent yeast infections.  - Referral to Gynecology    2. Chronic bilateral low back pain with right-sided sciatica  This is a chronic problem.  Medication preordered for April 4.  - HYDROcodone-acetaminophen (NORCO) 5-325 MG Tab per tablet; Take 1.5 Tablets by mouth every 8 hours as needed (pain) for up to 30 days.  Dispense: 45 Tablet; Refill: 0    3. Primary insomnia  This is a chronic problem.  Medicine preordered for next months when it is due on 4/15.  - zolpidem (AMBIEN) 10 MG Tab; Take 1 Tablet by mouth at bedtime as needed for Sleep for up to 90 days.  Dispense: 30 Tablet; Refill: 2    4. Anxiety  Is a chronic stable condition.  Continue to follow.      Return in about 3 months (around 6/26/2024) for Long.    Please note that this dictation was created using voice recognition software. I have made every reasonable attempt to correct obvious errors, but I expect that there are errors of grammar and possibly content that I did not discover before finalizing the note.

## 2024-03-26 NOTE — ASSESSMENT & PLAN NOTE
This is a chronic problem.  She does use alprazolam when she travels.  Her urine drug screen was negative for that since it is a as needed medication and not use regularly.  I discussed that with her.

## 2024-04-24 ENCOUNTER — OFFICE VISIT (OUTPATIENT)
Dept: URGENT CARE | Facility: PHYSICIAN GROUP | Age: 69
End: 2024-04-24
Payer: COMMERCIAL

## 2024-04-24 VITALS
OXYGEN SATURATION: 97 % | WEIGHT: 185.52 LBS | TEMPERATURE: 96.6 F | SYSTOLIC BLOOD PRESSURE: 140 MMHG | HEIGHT: 66 IN | DIASTOLIC BLOOD PRESSURE: 82 MMHG | BODY MASS INDEX: 29.82 KG/M2 | RESPIRATION RATE: 18 BRPM | HEART RATE: 87 BPM

## 2024-04-24 DIAGNOSIS — L30.9 ECZEMA, UNSPECIFIED TYPE: ICD-10-CM

## 2024-04-24 PROCEDURE — 3077F SYST BP >= 140 MM HG: CPT

## 2024-04-24 PROCEDURE — 99213 OFFICE O/P EST LOW 20 MIN: CPT

## 2024-04-24 PROCEDURE — 3079F DIAST BP 80-89 MM HG: CPT

## 2024-04-24 RX ORDER — TRIAMCINOLONE ACETONIDE 1 MG/G
1 OINTMENT TOPICAL 2 TIMES DAILY
Qty: 15 G | Refills: 0 | Status: SHIPPED | OUTPATIENT
Start: 2024-04-24 | End: 2024-05-08

## 2024-04-24 ASSESSMENT — FIBROSIS 4 INDEX: FIB4 SCORE: 1.043900738283641522

## 2024-04-24 NOTE — PROGRESS NOTES
"Chief Complaint   Patient presents with    Itching     Itchy scaling on bilateral ears, more on the L ear on and off x 3 months         Subjective:   HISTORY OF PRESENT ILLNESS: Lurdes Ramon is a 68 y.o. female who presents for itching and scaling to her left ear for >3 months on and off.     Patient denies fevers, drainge from the ear, headaches.     Medications, Allergies, current problem list, Social and Family history reviewed today in Epic.     Objective:     BP (!) 140/82 (BP Location: Left arm, Patient Position: Sitting, BP Cuff Size: Adult long)   Pulse 87   Temp 35.9 °C (96.6 °F) (Temporal)   Resp 18   Ht 1.676 m (5' 6\")   Wt 84.1 kg (185 lb 8.3 oz)   SpO2 97%     Physical Exam  Vitals reviewed.   Constitutional:       Appearance: Normal appearance.   HENT:      Ears:        Comments: Red irritated skin noted to to the tragus with mild extension to the external meatus no internal canal scaling or erythema noted.      Mouth/Throat:      Mouth: Mucous membranes are moist.   Cardiovascular:      Rate and Rhythm: Normal rate.   Pulmonary:      Effort: Pulmonary effort is normal.   Skin:     General: Skin is warm and dry.   Neurological:      Mental Status: She is alert and oriented to person, place, and time.   Psychiatric:         Mood and Affect: Mood normal.          Assessment/Plan:     Diagnosis and associated orders    I personally reviewed prior external notes and test results pertinent to today's visit.     1. Eczema, unspecified type  triamcinolone acetonide (KENALOG) 0.1 % Ointment            IMPRESSION:  Pt has stable vital signs and no red flag symptoms or exam findings identified.   Informed pt that symptoms are consistent with dermatitis to the external ear.  The canal itself is intact. .  Advised to use steroid cream to the effected site, alternate with Aquaphor after showers.  Fu with PCP/derm if not resolving    Differential diagnosis discussed. Pt was Educated on red flag " symptoms. Pt has been Instructed to return to Urgent Care or nearest Emergency Department if symptoms fail to improve, for any change in condition, further concerns, or new concerning symptoms. Patient states understanding of the plan of care and discharge instructions.  They are discharged in stable condition.         Please note that this dictation was created using voice recognition software. I have made a reasonable attempt to correct obvious errors, but I expect that there are errors of grammar and possibly content that I did not discover before finalizing the note.    This note was electronically signed by LIANG Carrington

## 2024-05-03 ENCOUNTER — PATIENT MESSAGE (OUTPATIENT)
Dept: MEDICAL GROUP | Facility: PHYSICIAN GROUP | Age: 69
End: 2024-05-03
Payer: COMMERCIAL

## 2024-05-03 DIAGNOSIS — G89.29 CHRONIC BILATERAL LOW BACK PAIN WITH RIGHT-SIDED SCIATICA: ICD-10-CM

## 2024-05-03 DIAGNOSIS — M54.41 CHRONIC BILATERAL LOW BACK PAIN WITH RIGHT-SIDED SCIATICA: ICD-10-CM

## 2024-05-03 RX ORDER — HYDROCODONE BITARTRATE AND ACETAMINOPHEN 5; 325 MG/1; MG/1
1.5 TABLET ORAL EVERY 8 HOURS PRN
Qty: 45 TABLET | Refills: 0 | Status: SHIPPED | OUTPATIENT
Start: 2024-05-03 | End: 2024-05-30 | Stop reason: SDUPTHER

## 2024-05-30 DIAGNOSIS — M54.41 CHRONIC BILATERAL LOW BACK PAIN WITH RIGHT-SIDED SCIATICA: ICD-10-CM

## 2024-05-30 DIAGNOSIS — G89.29 CHRONIC BILATERAL LOW BACK PAIN WITH RIGHT-SIDED SCIATICA: ICD-10-CM

## 2024-05-30 RX ORDER — HYDROCODONE BITARTRATE AND ACETAMINOPHEN 5; 325 MG/1; MG/1
1.5 TABLET ORAL EVERY 8 HOURS PRN
Qty: 45 TABLET | Refills: 0 | Status: SHIPPED | OUTPATIENT
Start: 2024-06-03 | End: 2024-07-03

## 2024-05-30 NOTE — TELEPHONE ENCOUNTER
Received request via: Patient    Was the patient seen in the last year in this department? Yes    Does the patient have an active prescription (recently filled or refills available) for medication(s) requested? No    Pharmacy Name: [facility] #[id number] - [place], [place] [address] [facility]     Does the patient have [facility] and need 100 day supply (blood pressure, diabetes and cholesterol meds only)? Patient does not have SCP

## 2024-06-17 ENCOUNTER — APPOINTMENT (OUTPATIENT)
Dept: LAB | Facility: MEDICAL CENTER | Age: 69
End: 2024-06-17
Payer: COMMERCIAL

## 2024-06-18 ENCOUNTER — APPOINTMENT (OUTPATIENT)
Dept: MEDICAL GROUP | Facility: PHYSICIAN GROUP | Age: 69
End: 2024-06-18
Payer: COMMERCIAL

## 2024-06-18 VITALS
RESPIRATION RATE: 16 BRPM | OXYGEN SATURATION: 97 % | HEART RATE: 82 BPM | WEIGHT: 183 LBS | BODY MASS INDEX: 29.41 KG/M2 | HEIGHT: 66 IN | SYSTOLIC BLOOD PRESSURE: 132 MMHG | TEMPERATURE: 98.9 F | DIASTOLIC BLOOD PRESSURE: 84 MMHG

## 2024-06-18 DIAGNOSIS — M54.41 CHRONIC BILATERAL LOW BACK PAIN WITH RIGHT-SIDED SCIATICA: ICD-10-CM

## 2024-06-18 DIAGNOSIS — G89.29 CHRONIC BILATERAL LOW BACK PAIN WITH RIGHT-SIDED SCIATICA: ICD-10-CM

## 2024-06-18 DIAGNOSIS — F51.01 PRIMARY INSOMNIA: ICD-10-CM

## 2024-06-18 DIAGNOSIS — H61.22 CERUMINOSIS, LEFT: ICD-10-CM

## 2024-06-18 PROCEDURE — 69210 REMOVE IMPACTED EAR WAX UNI: CPT | Mod: LT | Performed by: FAMILY MEDICINE

## 2024-06-18 PROCEDURE — 3075F SYST BP GE 130 - 139MM HG: CPT | Performed by: FAMILY MEDICINE

## 2024-06-18 PROCEDURE — 99214 OFFICE O/P EST MOD 30 MIN: CPT | Mod: 25 | Performed by: FAMILY MEDICINE

## 2024-06-18 PROCEDURE — 3079F DIAST BP 80-89 MM HG: CPT | Performed by: FAMILY MEDICINE

## 2024-06-18 RX ORDER — HYDROCODONE BITARTRATE AND ACETAMINOPHEN 5; 325 MG/1; MG/1
0.5 TABLET ORAL EVERY 6 HOURS PRN
Qty: 60 TABLET | Refills: 0 | Status: SHIPPED | OUTPATIENT
Start: 2024-06-18 | End: 2024-07-18

## 2024-06-18 ASSESSMENT — FIBROSIS 4 INDEX: FIB4 SCORE: 1.06

## 2024-06-18 NOTE — ASSESSMENT & PLAN NOTE
This is a chronic problem.  Patient just refilled her Ambien last week but she is here for her 90-day visit regarding that.

## 2024-06-18 NOTE — ASSESSMENT & PLAN NOTE
This is a chronic problem.  Patient states she has had a recent exacerbation of her low back pain.  It does affect her quality of life.  She increased her pain medicine up to 4/2 pills a day and that seems to help.  She is asking if we can change the prescription to that.  She is resistant to going back to the spine specialist.  I did review the last note in  where they talked about the possibility of redoing surgery if she had continued issues.  She also is resistant to trying something like Lyrica or Neurontin as she states she had a bad effect from them before.  We also talked about duloxetine but she does not want that either.  She has been having more stress in her life recently as the nephew .  Also her daughter underwent hysterectomy last week.  All this could be contributing to her issues.
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
## 2024-06-18 NOTE — ASSESSMENT & PLAN NOTE
This is a new problem.  She states she was seen in urgent care and found to have a cerumen impaction.  She was told she could use over-the-counter products.  It still bothering her and she is here to have it looked at.

## 2024-06-26 ENCOUNTER — APPOINTMENT (OUTPATIENT)
Dept: LAB | Facility: MEDICAL CENTER | Age: 69
End: 2024-06-26
Payer: COMMERCIAL

## 2024-06-27 ENCOUNTER — HOSPITAL ENCOUNTER (OUTPATIENT)
Dept: LAB | Facility: MEDICAL CENTER | Age: 69
End: 2024-06-27
Attending: FAMILY MEDICINE
Payer: COMMERCIAL

## 2024-06-27 DIAGNOSIS — Z00.00 ADULT GENERAL MEDICAL EXAM: ICD-10-CM

## 2024-06-27 DIAGNOSIS — E53.8 VITAMIN B12 DEFICIENCY: ICD-10-CM

## 2024-06-27 DIAGNOSIS — E55.9 AVITAMINOSIS D: ICD-10-CM

## 2024-06-27 DIAGNOSIS — R53.82 CHRONIC FATIGUE: ICD-10-CM

## 2024-06-27 LAB
25(OH)D3 SERPL-MCNC: 34 NG/ML (ref 30–100)
ALBUMIN SERPL BCP-MCNC: 4.1 G/DL (ref 3.2–4.9)
ALBUMIN/GLOB SERPL: 1.3 G/DL
ALP SERPL-CCNC: 76 U/L (ref 30–99)
ALT SERPL-CCNC: 19 U/L (ref 2–50)
ANION GAP SERPL CALC-SCNC: 12 MMOL/L (ref 7–16)
APPEARANCE UR: ABNORMAL
AST SERPL-CCNC: 23 U/L (ref 12–45)
BACTERIA #/AREA URNS HPF: ABNORMAL /HPF
BASOPHILS # BLD AUTO: 0.3 % (ref 0–1.8)
BASOPHILS # BLD: 0.03 K/UL (ref 0–0.12)
BILIRUB SERPL-MCNC: 0.6 MG/DL (ref 0.1–1.5)
BILIRUB UR QL STRIP.AUTO: NEGATIVE
BUN SERPL-MCNC: 11 MG/DL (ref 8–22)
CALCIUM ALBUM COR SERPL-MCNC: 9.2 MG/DL (ref 8.5–10.5)
CALCIUM SERPL-MCNC: 9.3 MG/DL (ref 8.5–10.5)
CHLORIDE SERPL-SCNC: 102 MMOL/L (ref 96–112)
CHOLEST SERPL-MCNC: 186 MG/DL (ref 100–199)
CO2 SERPL-SCNC: 25 MMOL/L (ref 20–33)
COLOR UR: YELLOW
CREAT SERPL-MCNC: 0.67 MG/DL (ref 0.5–1.4)
EOSINOPHIL # BLD AUTO: 0.07 K/UL (ref 0–0.51)
EOSINOPHIL NFR BLD: 0.8 % (ref 0–6.9)
EPI CELLS #/AREA URNS HPF: ABNORMAL /HPF
ERYTHROCYTE [DISTWIDTH] IN BLOOD BY AUTOMATED COUNT: 44.7 FL (ref 35.9–50)
GFR SERPLBLD CREATININE-BSD FMLA CKD-EPI: 94 ML/MIN/1.73 M 2
GLOBULIN SER CALC-MCNC: 3.2 G/DL (ref 1.9–3.5)
GLUCOSE SERPL-MCNC: 101 MG/DL (ref 65–99)
GLUCOSE UR STRIP.AUTO-MCNC: NEGATIVE MG/DL
HCT VFR BLD AUTO: 43.5 % (ref 37–47)
HDLC SERPL-MCNC: 67 MG/DL
HGB BLD-MCNC: 14.7 G/DL (ref 12–16)
HYALINE CASTS #/AREA URNS LPF: ABNORMAL /LPF
IMM GRANULOCYTES # BLD AUTO: 0.02 K/UL (ref 0–0.11)
IMM GRANULOCYTES NFR BLD AUTO: 0.2 % (ref 0–0.9)
KETONES UR STRIP.AUTO-MCNC: NEGATIVE MG/DL
LDLC SERPL CALC-MCNC: 88 MG/DL
LEUKOCYTE ESTERASE UR QL STRIP.AUTO: NEGATIVE
LYMPHOCYTES # BLD AUTO: 1.62 K/UL (ref 1–4.8)
LYMPHOCYTES NFR BLD: 18.5 % (ref 22–41)
MCH RBC QN AUTO: 33.8 PG (ref 27–33)
MCHC RBC AUTO-ENTMCNC: 33.8 G/DL (ref 32.2–35.5)
MCV RBC AUTO: 100 FL (ref 81.4–97.8)
MICRO URNS: ABNORMAL
MONOCYTES # BLD AUTO: 0.59 K/UL (ref 0–0.85)
MONOCYTES NFR BLD AUTO: 6.8 % (ref 0–13.4)
MUCOUS THREADS #/AREA URNS HPF: ABNORMAL /HPF
NEUTROPHILS # BLD AUTO: 6.41 K/UL (ref 1.82–7.42)
NEUTROPHILS NFR BLD: 73.4 % (ref 44–72)
NITRITE UR QL STRIP.AUTO: NEGATIVE
NRBC # BLD AUTO: 0 K/UL
NRBC BLD-RTO: 0 /100 WBC (ref 0–0.2)
PH UR STRIP.AUTO: 5.5 [PH] (ref 5–8)
PLATELET # BLD AUTO: 244 K/UL (ref 164–446)
PMV BLD AUTO: 11.5 FL (ref 9–12.9)
POTASSIUM SERPL-SCNC: 4 MMOL/L (ref 3.6–5.5)
PROT SERPL-MCNC: 7.3 G/DL (ref 6–8.2)
PROT UR QL STRIP: NEGATIVE MG/DL
RBC # BLD AUTO: 4.35 M/UL (ref 4.2–5.4)
RBC # URNS HPF: ABNORMAL /HPF
RBC UR QL AUTO: ABNORMAL
SODIUM SERPL-SCNC: 139 MMOL/L (ref 135–145)
SP GR UR STRIP.AUTO: 1.02
TRIGL SERPL-MCNC: 157 MG/DL (ref 0–149)
UROBILINOGEN UR STRIP.AUTO-MCNC: 0.2 MG/DL
VIT B12 SERPL-MCNC: 573 PG/ML (ref 211–911)
WBC # BLD AUTO: 8.7 K/UL (ref 4.8–10.8)
WBC #/AREA URNS HPF: ABNORMAL /HPF

## 2024-06-27 PROCEDURE — 80053 COMPREHEN METABOLIC PANEL: CPT

## 2024-06-27 PROCEDURE — 85025 COMPLETE CBC W/AUTO DIFF WBC: CPT

## 2024-06-27 PROCEDURE — 82607 VITAMIN B-12: CPT

## 2024-06-27 PROCEDURE — 82306 VITAMIN D 25 HYDROXY: CPT

## 2024-06-27 PROCEDURE — 80061 LIPID PANEL: CPT

## 2024-06-27 PROCEDURE — 81001 URINALYSIS AUTO W/SCOPE: CPT

## 2024-06-27 PROCEDURE — 36415 COLL VENOUS BLD VENIPUNCTURE: CPT

## 2024-06-28 DIAGNOSIS — R31.21 ASYMPTOMATIC MICROSCOPIC HEMATURIA: ICD-10-CM

## 2024-07-09 ENCOUNTER — HOSPITAL ENCOUNTER (OUTPATIENT)
Dept: RADIOLOGY | Facility: MEDICAL CENTER | Age: 69
End: 2024-07-09
Attending: FAMILY MEDICINE
Payer: COMMERCIAL

## 2024-07-09 ENCOUNTER — HOSPITAL ENCOUNTER (OUTPATIENT)
Dept: LAB | Facility: MEDICAL CENTER | Age: 69
End: 2024-07-09
Attending: FAMILY MEDICINE
Payer: COMMERCIAL

## 2024-07-09 DIAGNOSIS — R31.21 ASYMPTOMATIC MICROSCOPIC HEMATURIA: ICD-10-CM

## 2024-07-09 DIAGNOSIS — Z12.31 VISIT FOR SCREENING MAMMOGRAM: ICD-10-CM

## 2024-07-09 LAB
APPEARANCE UR: CLEAR
BACTERIA #/AREA URNS HPF: NEGATIVE /HPF
BILIRUB UR QL STRIP.AUTO: NEGATIVE
COLOR UR: YELLOW
EPI CELLS #/AREA URNS HPF: NEGATIVE /HPF
GLUCOSE UR STRIP.AUTO-MCNC: NEGATIVE MG/DL
HYALINE CASTS #/AREA URNS LPF: NORMAL /LPF
KETONES UR STRIP.AUTO-MCNC: NEGATIVE MG/DL
LEUKOCYTE ESTERASE UR QL STRIP.AUTO: NEGATIVE
MICRO URNS: ABNORMAL
NITRITE UR QL STRIP.AUTO: NEGATIVE
PH UR STRIP.AUTO: 6.5 [PH] (ref 5–8)
PROT UR QL STRIP: NEGATIVE MG/DL
RBC # URNS HPF: NORMAL /HPF
RBC UR QL AUTO: ABNORMAL
SP GR UR STRIP.AUTO: 1.01
UROBILINOGEN UR STRIP.AUTO-MCNC: 0.2 MG/DL
WBC #/AREA URNS HPF: NORMAL /HPF

## 2024-07-09 PROCEDURE — 77063 BREAST TOMOSYNTHESIS BI: CPT

## 2024-07-09 PROCEDURE — 81001 URINALYSIS AUTO W/SCOPE: CPT

## 2024-07-11 DIAGNOSIS — F51.01 PRIMARY INSOMNIA: ICD-10-CM

## 2024-07-11 RX ORDER — ZOLPIDEM TARTRATE 10 MG/1
10 TABLET ORAL NIGHTLY PRN
Qty: 30 TABLET | Refills: 2 | Status: SHIPPED | OUTPATIENT
Start: 2024-07-11 | End: 2024-10-09

## 2024-07-15 DIAGNOSIS — B00.1 COLD SORE: ICD-10-CM

## 2024-07-16 RX ORDER — VALACYCLOVIR HYDROCHLORIDE 1 G/1
TABLET, FILM COATED ORAL
Qty: 20 TABLET | Refills: 5 | Status: SHIPPED | OUTPATIENT
Start: 2024-07-16

## 2024-07-19 DIAGNOSIS — M54.41 CHRONIC BILATERAL LOW BACK PAIN WITH RIGHT-SIDED SCIATICA: ICD-10-CM

## 2024-07-19 DIAGNOSIS — G89.29 CHRONIC BILATERAL LOW BACK PAIN WITH RIGHT-SIDED SCIATICA: ICD-10-CM

## 2024-07-19 RX ORDER — HYDROCODONE BITARTRATE AND ACETAMINOPHEN 5; 325 MG/1; MG/1
0.5 TABLET ORAL EVERY 6 HOURS PRN
Qty: 60 TABLET | Refills: 0 | Status: SHIPPED | OUTPATIENT
Start: 2024-07-20 | End: 2024-08-19

## 2024-08-19 DIAGNOSIS — M54.41 CHRONIC BILATERAL LOW BACK PAIN WITH RIGHT-SIDED SCIATICA: ICD-10-CM

## 2024-08-19 DIAGNOSIS — G89.29 CHRONIC BILATERAL LOW BACK PAIN WITH RIGHT-SIDED SCIATICA: ICD-10-CM

## 2024-08-19 RX ORDER — HYDROCODONE BITARTRATE AND ACETAMINOPHEN 5; 325 MG/1; MG/1
0.5 TABLET ORAL EVERY 6 HOURS PRN
Qty: 60 TABLET | Refills: 0 | Status: SHIPPED | OUTPATIENT
Start: 2024-08-19 | End: 2024-09-18

## 2024-08-19 NOTE — TELEPHONE ENCOUNTER
Received request via: Patient    Was the patient seen in the last year in this department? Yes    Does the patient have an active prescription (recently filled or refills available) for medication(s) requested? No    Pharmacy Name: [facility] #[id number] - [place], [place] [facility] [address] [facility] [address]     Does the patient have prison Plus and need 100-day supply? (This applies to ALL medications) Patient does not have SCP

## 2024-09-10 ENCOUNTER — APPOINTMENT (OUTPATIENT)
Dept: MEDICAL GROUP | Facility: PHYSICIAN GROUP | Age: 69
End: 2024-09-10
Payer: COMMERCIAL

## 2024-09-10 VITALS
OXYGEN SATURATION: 96 % | DIASTOLIC BLOOD PRESSURE: 82 MMHG | SYSTOLIC BLOOD PRESSURE: 138 MMHG | RESPIRATION RATE: 16 BRPM | TEMPERATURE: 98.6 F | HEIGHT: 66 IN | HEART RATE: 84 BPM | BODY MASS INDEX: 29.41 KG/M2 | WEIGHT: 183 LBS

## 2024-09-10 DIAGNOSIS — F41.9 ANXIETY: ICD-10-CM

## 2024-09-10 DIAGNOSIS — H61.22 CERUMINOSIS, LEFT: ICD-10-CM

## 2024-09-10 DIAGNOSIS — Z12.11 COLON CANCER SCREENING: ICD-10-CM

## 2024-09-10 DIAGNOSIS — Z79.890 HORMONE REPLACEMENT THERAPY (HRT): ICD-10-CM

## 2024-09-10 DIAGNOSIS — F51.01 PRIMARY INSOMNIA: ICD-10-CM

## 2024-09-10 DIAGNOSIS — G89.29 CHRONIC BILATERAL LOW BACK PAIN WITH RIGHT-SIDED SCIATICA: ICD-10-CM

## 2024-09-10 DIAGNOSIS — M54.41 CHRONIC BILATERAL LOW BACK PAIN WITH RIGHT-SIDED SCIATICA: ICD-10-CM

## 2024-09-10 PROCEDURE — 3079F DIAST BP 80-89 MM HG: CPT | Performed by: FAMILY MEDICINE

## 2024-09-10 PROCEDURE — 99214 OFFICE O/P EST MOD 30 MIN: CPT | Performed by: FAMILY MEDICINE

## 2024-09-10 PROCEDURE — 3075F SYST BP GE 130 - 139MM HG: CPT | Performed by: FAMILY MEDICINE

## 2024-09-10 RX ORDER — HYDROCODONE BITARTRATE AND ACETAMINOPHEN 5; 325 MG/1; MG/1
0.5 TABLET ORAL EVERY 6 HOURS PRN
Qty: 60 TABLET | Refills: 0 | Status: SHIPPED | OUTPATIENT
Start: 2024-09-18 | End: 2024-10-18

## 2024-09-10 ASSESSMENT — FIBROSIS 4 INDEX: FIB4 SCORE: 1.49
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
## 2024-09-10 NOTE — ASSESSMENT & PLAN NOTE
This is a chronic problem.  She is here for refill on her pain medications.  It is due next week.  Her agreement is still in place.  No changes in condition.

## 2024-09-10 NOTE — ASSESSMENT & PLAN NOTE
This is a chronic problem.  Patient states that she is feels like she is doing better on her current regimen of hormones.  She did try to find another provider in town to see her but they told her given her age she should not be on them anyway.  It does improve her quality of life and wants to stay on them for now.  She states she is having less yeast infections now that she had off of them.
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
## 2024-09-10 NOTE — ASSESSMENT & PLAN NOTE
This is a chronic problem.  Patient is here for follow-up.  She still has 1 more refill left on her prescription.  When that runs out we will refill it again.

## 2024-10-10 DIAGNOSIS — F51.01 PRIMARY INSOMNIA: ICD-10-CM

## 2024-10-11 RX ORDER — ZOLPIDEM TARTRATE 10 MG/1
10 TABLET ORAL NIGHTLY PRN
Qty: 30 TABLET | Refills: 1 | Status: SHIPPED | OUTPATIENT
Start: 2024-10-11 | End: 2024-12-10

## 2024-10-11 RX ORDER — ESTRADIOL 1 MG/1
2 TABLET ORAL DAILY
Qty: 180 TABLET | Refills: 3 | Status: SHIPPED | OUTPATIENT
Start: 2024-10-11

## 2024-10-18 DIAGNOSIS — G89.29 CHRONIC BILATERAL LOW BACK PAIN WITH RIGHT-SIDED SCIATICA: ICD-10-CM

## 2024-10-18 DIAGNOSIS — M54.41 CHRONIC BILATERAL LOW BACK PAIN WITH RIGHT-SIDED SCIATICA: ICD-10-CM

## 2024-10-18 RX ORDER — HYDROCODONE BITARTRATE AND ACETAMINOPHEN 5; 325 MG/1; MG/1
0.5 TABLET ORAL EVERY 6 HOURS PRN
Qty: 60 TABLET | Refills: 0 | Status: SHIPPED | OUTPATIENT
Start: 2024-10-18 | End: 2024-11-17

## 2024-11-17 DIAGNOSIS — G89.29 CHRONIC BILATERAL LOW BACK PAIN WITH RIGHT-SIDED SCIATICA: ICD-10-CM

## 2024-11-17 DIAGNOSIS — M54.41 CHRONIC BILATERAL LOW BACK PAIN WITH RIGHT-SIDED SCIATICA: ICD-10-CM

## 2024-11-18 DIAGNOSIS — Z12.11 COLON CANCER SCREENING: ICD-10-CM

## 2024-11-19 RX ORDER — HYDROCODONE BITARTRATE AND ACETAMINOPHEN 5; 325 MG/1; MG/1
0.5 TABLET ORAL EVERY 6 HOURS PRN
Qty: 60 TABLET | Refills: 0 | Status: SHIPPED | OUTPATIENT
Start: 2024-11-19 | End: 2024-12-19

## 2024-11-19 NOTE — TELEPHONE ENCOUNTER
Received request via: Patient    Was the patient seen in the last year in this department? Yes    Does the patient have an active prescription (recently filled or refills available) for medication(s) requested? No    Pharmacy Name:   [id number] - [place], [place] - [facility] [address] AT [facility] & [address] [place] [address]  Phone: [phone] Fax: [phone]        Does the patient have MCFP Plus and need 100-day supply? (This applies to ALL medications) Patient does not have SCP

## 2024-11-20 ENCOUNTER — HOSPITAL ENCOUNTER (OUTPATIENT)
Facility: MEDICAL CENTER | Age: 69
End: 2024-11-20
Attending: FAMILY MEDICINE
Payer: COMMERCIAL

## 2024-11-20 DIAGNOSIS — M54.41 CHRONIC BILATERAL LOW BACK PAIN WITH RIGHT-SIDED SCIATICA: ICD-10-CM

## 2024-11-20 DIAGNOSIS — G89.29 CHRONIC BILATERAL LOW BACK PAIN WITH RIGHT-SIDED SCIATICA: ICD-10-CM

## 2024-11-20 PROCEDURE — 80307 DRUG TEST PRSMV CHEM ANLYZR: CPT

## 2024-11-20 PROCEDURE — G0480 DRUG TEST DEF 1-7 CLASSES: HCPCS

## 2024-11-22 LAB
AMPHET CTO UR CFM-MCNC: NEGATIVE NG/ML
BARBITURATES CTO UR CFM-MCNC: NEGATIVE NG/ML
BENZODIAZ CTO UR CFM-MCNC: NEGATIVE NG/ML
CANNABINOIDS CTO UR CFM-MCNC: NEGATIVE NG/ML
COCAINE CTO UR CFM-MCNC: NEGATIVE NG/ML
CREAT UR-MCNC: 54.2 MG/DL (ref 20–400)
DRUG COMMENT 753798: NORMAL
METHADONE CTO UR CFM-MCNC: NEGATIVE NG/ML
OPIATES CTO UR CFM-MCNC: NORMAL NG/ML
PCP CTO UR CFM-MCNC: NEGATIVE NG/ML
PROPOXYPH CTO UR CFM-MCNC: NEGATIVE NG/ML

## 2024-11-24 LAB
6MAM UR CFM-MCNC: <10 NG/ML
CODEINE UR CFM-MCNC: <20 NG/ML
HYDROCODONE UR CFM-MCNC: 163 NG/ML
HYDROMORPHONE UR CFM-MCNC: <20 NG/ML
MORPHINE UR CFM-MCNC: <20 NG/ML
NORHYDROCODONE UR CFM-MCNC: 251 NG/ML
NOROXYCODONE UR CFM-MCNC: <20 NG/ML
OPIATES UR NOROXYM Q0836: <20 NG/ML
OXYCODONE UR CFM-MCNC: <20 NG/ML
OXYMORPHONE UR CFM-MCNC: <20 NG/ML

## 2024-12-09 ENCOUNTER — APPOINTMENT (OUTPATIENT)
Dept: MEDICAL GROUP | Facility: PHYSICIAN GROUP | Age: 69
End: 2024-12-09
Payer: COMMERCIAL

## 2024-12-09 LAB — NONINV COLON CA DNA+OCC BLD SCRN STL QL: POSITIVE

## 2024-12-10 DIAGNOSIS — R19.5 POSITIVE COLORECTAL CANCER SCREENING USING COLOGUARD TEST: ICD-10-CM

## 2024-12-11 DIAGNOSIS — F51.01 PRIMARY INSOMNIA: ICD-10-CM

## 2024-12-11 RX ORDER — ZOLPIDEM TARTRATE 10 MG/1
10 TABLET ORAL NIGHTLY PRN
Qty: 30 TABLET | Refills: 0 | Status: SHIPPED | OUTPATIENT
Start: 2024-12-11 | End: 2025-01-10

## 2024-12-13 ENCOUNTER — OFFICE VISIT (OUTPATIENT)
Dept: MEDICAL GROUP | Facility: PHYSICIAN GROUP | Age: 69
End: 2024-12-13
Payer: COMMERCIAL

## 2024-12-13 VITALS
WEIGHT: 183 LBS | DIASTOLIC BLOOD PRESSURE: 74 MMHG | HEART RATE: 80 BPM | TEMPERATURE: 97.8 F | RESPIRATION RATE: 16 BRPM | HEIGHT: 66 IN | SYSTOLIC BLOOD PRESSURE: 126 MMHG | OXYGEN SATURATION: 97 % | BODY MASS INDEX: 29.41 KG/M2

## 2024-12-13 DIAGNOSIS — R19.5 POSITIVE COLORECTAL CANCER SCREENING USING COLOGUARD TEST: ICD-10-CM

## 2024-12-13 DIAGNOSIS — F41.9 ANXIETY: ICD-10-CM

## 2024-12-13 DIAGNOSIS — R00.2 PALPITATION: ICD-10-CM

## 2024-12-13 DIAGNOSIS — H61.22 CERUMINOSIS, LEFT: ICD-10-CM

## 2024-12-13 DIAGNOSIS — B00.1 COLD SORE: ICD-10-CM

## 2024-12-13 DIAGNOSIS — F51.01 PRIMARY INSOMNIA: ICD-10-CM

## 2024-12-13 PROCEDURE — 3078F DIAST BP <80 MM HG: CPT | Performed by: FAMILY MEDICINE

## 2024-12-13 PROCEDURE — 99214 OFFICE O/P EST MOD 30 MIN: CPT | Performed by: FAMILY MEDICINE

## 2024-12-13 PROCEDURE — 3074F SYST BP LT 130 MM HG: CPT | Performed by: FAMILY MEDICINE

## 2024-12-13 RX ORDER — ONDANSETRON 4 MG/1
4 TABLET, FILM COATED ORAL EVERY 6 HOURS PRN
Qty: 5 TABLET | Refills: 0 | Status: SHIPPED | OUTPATIENT
Start: 2024-12-13 | End: 2024-12-16

## 2024-12-13 RX ORDER — ALPRAZOLAM 0.5 MG
0.5 TABLET ORAL 3 TIMES DAILY PRN
Qty: 15 TABLET | Refills: 0 | Status: SHIPPED | OUTPATIENT
Start: 2024-12-13 | End: 2025-01-12

## 2024-12-13 ASSESSMENT — FIBROSIS 4 INDEX: FIB4 SCORE: 1.49

## 2024-12-13 NOTE — PROGRESS NOTES
Subjective:     CC: Here for several issues.    HPI:   Lurdes presents today with the following medical concerns:    Insomnia  This is a chronic problem.  Patient is here for 90-day follow-up.  Her medication was renewed for 30 days and when that runs out I will do it again for another 2 months.    Positive colorectal cancer screening using Cologuard test  This is a new issue.  Her Cologuard was positive.  I did do a referral and she is set up to have a colonoscopy on Monday.  She was concerned and we discussed the issue.  She was much more calm down after we discussed it together.    Palpitation  This is a chronic problem.  She has had very intermittent episodes of palpitations however she had 3 episodes after getting her flu shot 2 months ago.  It has not recurred since then.  She wants to know what to watch for in regards to concerns and we went over that.    Cold sore  This is a chronic problem.  She uses the Valtrex when she gets an occasional cold sore outbreak.  However this last time she states it got stuck in her throat for 2 days.  She like to have some liquid version.  I told her that would have to be acyclovir and she can call me when she needs that and we will change her over to that.  We would dose it at 200 mg/tsp. 2 teaspoons 3 times daily for 5 days as needed for outbreak.    Ceruminosis, left  This is a recurrent problem.  She is having some plugging of her left ear.  No pain.    Anxiety  This is a chronic issue.  At the end of the visit she stated she like to have a refill on her alprazolam.  She has had 15 pills that have lasted her a year.  She primarily only uses them when she travels is flying makes her very anxious.    Past Medical History:   Diagnosis Date    Back pain     Insomnia        Social History     Tobacco Use    Smoking status: Former     Current packs/day: 0.00     Average packs/day: 1 pack/day for 15.0 years (15.0 ttl pk-yrs)     Types: Cigarettes     Start date: 10/26/1981      Quit date: 10/26/1996     Years since quittin.1    Smokeless tobacco: Never   Vaping Use    Vaping status: Never Used   Substance Use Topics    Alcohol use: Yes     Comment: 4 times yearly    Drug use: No       Current Outpatient Medications Ordered in Epic   Medication Sig Dispense Refill    ondansetron (ZOFRAN) 4 MG Tab tablet Take 1 Tablet by mouth every 6 hours as needed for Nausea/Vomiting for up to 3 days. 5 Tablet 0    ALPRAZolam (XANAX) 0.5 MG Tab Take 1 Tablet by mouth 3 times a day as needed for Anxiety for up to 30 days. As needed for flying. 15 Tablet 0    zolpidem (AMBIEN) 10 MG Tab Take 1 Tablet by mouth at bedtime as needed for Sleep for up to 30 days. 30 Tablet 0    HYDROcodone-acetaminophen (NORCO) 5-325 MG Tab per tablet Take 0.5 Tablets by mouth every 6 hours as needed (pain) for up to 30 days. 60 Tablet 0    estradiol (ESTRACE) 1 MG Tab Take 2 Tablets by mouth every day. 180 Tablet 3    valacyclovir (VALTREX) 1 GM Tab 2 po bid for 1 day prn outbreak 20 Tablet 5    estrogens, conjugated (PREMARIN) 0.625 MG/GM Cream Insert 0.5 g into the vagina two times a week. 30 g 3    Cyanocobalamin (B-12) 1000 MCG Tab Take  by mouth.      loratadine (CLARITIN) 10 MG Tab Take 10 mg by mouth every day.      Naproxen Sodium (ALEVE PO) Take  by mouth.      calcium carbonate (TUMS) 500 MG Chew Tab Chew 500 mg every day.      Cholecalciferol (VITAMIN D PO) Take 25 mcg by mouth every day.      Multiple Vitamin (MULTI-VITAMINS) Tab Take 1 tablet by mouth.       No current Epic-ordered facility-administered medications on file.       Allergies:  Pcn [penicillins], Sulfa drugs, and Clindamycin    Health Maintenance: Completed    ROS:  Gen: no fevers/chills, no changes in weight  Eyes: no changes in vision  ENT: no sore throat, no hearing loss, no bloody nose  Pulm: no sob, no cough  CV: no chest pain,   GI: no nausea/vomiting, no diarrhea  : no dysuria  MSk: no myalgias  Skin: no rash  Neuro: no headaches, no  "numbness/tingling  Heme/Lymph: no easy bruising      Objective:       Exam:  /74 (BP Location: Left arm, Patient Position: Sitting, BP Cuff Size: Adult)   Pulse 80   Temp 36.6 °C (97.8 °F) (Temporal)   Resp 16   Ht 1.676 m (5' 6\")   Wt 83 kg (183 lb)   SpO2 97%   BMI 29.54 kg/m²  Body mass index is 29.54 kg/m².    Gen: Alert and oriented, No apparent distress.  Ears:    Right ear canal and TM are normal.  Left ear canal is almost occluded with cerumen.  TM appears normal after cleaning.  He is irrigated with warm water by the medical assistant.  Lungs: Normal effort,   Ext: No clubbing, cyanosis, edema.        Assessment & Plan:     69 y.o. female with the following -     1. Ceruminosis, left  This is an acute resolved problem.  Cerumen was removed using water irrigation by the medical assistant.  - Ear Cerumen Removal    2. Palpitation  This is a recurrent problem.  We talked about warning signs and regarding to her heart.  If those develop she needs to be seen and evaluated.    3. Primary insomnia  This is a chronic problem.  Recheck again in 3 months.    4. Cold sore  This is a chronic problem.  She will let me know when she needs a refill and we will change her to acyclovir liquid.    5. Positive colorectal cancer screening using Cologuard test  This is a new problem.  We discussed the upcoming colonoscopy and she seemed much less stressed about it after we went through it.    6. Anxiety  This is a chronic problem.  She is asking for refill on her medication to use as needed for flying anxiety.  - ALPRAZolam (XANAX) 0.5 MG Tab; Take 1 Tablet by mouth 3 times a day as needed for Anxiety for up to 30 days. As needed for flying.  Dispense: 15 Tablet; Refill: 0      Return in about 3 months (around 3/13/2025) for Long.    Please note that this dictation was created using voice recognition software. I have made every reasonable attempt to correct obvious errors, but I expect that there are errors of " grammar and possibly content that I did not discover before finalizing the note.

## 2024-12-13 NOTE — ASSESSMENT & PLAN NOTE
This is a chronic problem.  She has had very intermittent episodes of palpitations however she had 3 episodes after getting her flu shot 2 months ago.  It has not recurred since then.  She wants to know what to watch for in regards to concerns and we went over that.

## 2024-12-13 NOTE — ASSESSMENT & PLAN NOTE
This is a new issue.  Her Cologuard was positive.  I did do a referral and she is set up to have a colonoscopy on Monday.  She was concerned and we discussed the issue.  She was much more calm down after we discussed it together.

## 2024-12-13 NOTE — ASSESSMENT & PLAN NOTE
This is a chronic problem.  Patient is here for 90-day follow-up.  Her medication was renewed for 30 days and when that runs out I will do it again for another 2 months.

## 2024-12-13 NOTE — ASSESSMENT & PLAN NOTE
This is a chronic issue.  At the end of the visit she stated she like to have a refill on her alprazolam.  She has had 15 pills that have lasted her a year.  She primarily only uses them when she travels is flying makes her very anxious.

## 2024-12-13 NOTE — ASSESSMENT & PLAN NOTE
This is a chronic problem.  She uses the Valtrex when she gets an occasional cold sore outbreak.  However this last time she states it got stuck in her throat for 2 days.  She like to have some liquid version.  I told her that would have to be acyclovir and she can call me when she needs that and we will change her over to that.  We would dose it at 200 mg/tsp. 2 teaspoons 3 times daily for 5 days as needed for outbreak.

## 2024-12-19 DIAGNOSIS — M54.41 CHRONIC BILATERAL LOW BACK PAIN WITH RIGHT-SIDED SCIATICA: ICD-10-CM

## 2024-12-19 DIAGNOSIS — G89.29 CHRONIC BILATERAL LOW BACK PAIN WITH RIGHT-SIDED SCIATICA: ICD-10-CM

## 2024-12-20 RX ORDER — HYDROCODONE BITARTRATE AND ACETAMINOPHEN 5; 325 MG/1; MG/1
0.5 TABLET ORAL EVERY 6 HOURS PRN
Qty: 60 TABLET | Refills: 0 | Status: SHIPPED | OUTPATIENT
Start: 2024-12-20 | End: 2025-01-19

## 2025-01-09 DIAGNOSIS — F51.01 PRIMARY INSOMNIA: ICD-10-CM

## 2025-01-10 RX ORDER — ZOLPIDEM TARTRATE 10 MG/1
10 TABLET ORAL NIGHTLY PRN
Qty: 30 TABLET | Refills: 0 | Status: SHIPPED | OUTPATIENT
Start: 2025-01-10 | End: 2025-02-09

## 2025-01-10 NOTE — TELEPHONE ENCOUNTER
Received request via: Patient    Was the patient seen in the last year in this department? Yes    Does the patient have an active prescription (recently filled or refills available) for medication(s) requested? No    Pharmacy Name:  [facility] #[id number] - [place], [place] [facility] [address] AT [facility] [address]     Does the patient have MCFP Plus and need 100-day supply? (This applies to ALL medications) Patient does not have SCP

## 2025-01-20 DIAGNOSIS — G89.29 CHRONIC BILATERAL LOW BACK PAIN WITH RIGHT-SIDED SCIATICA: ICD-10-CM

## 2025-01-20 DIAGNOSIS — M54.41 CHRONIC BILATERAL LOW BACK PAIN WITH RIGHT-SIDED SCIATICA: ICD-10-CM

## 2025-01-21 RX ORDER — HYDROCODONE BITARTRATE AND ACETAMINOPHEN 5; 325 MG/1; MG/1
0.5 TABLET ORAL EVERY 6 HOURS PRN
Qty: 60 TABLET | Refills: 0 | Status: SHIPPED | OUTPATIENT
Start: 2025-01-21 | End: 2025-02-20

## 2025-01-21 NOTE — TELEPHONE ENCOUNTER
Received request via: Patient    Was the patient seen in the last year in this department? Yes    Does the patient have an active prescription (recently filled or refills available) for medication(s) requested? No    [facility]    Does the patient have FDC Plus and need 100-day supply? (This applies to ALL medications) Patient does not have SCP

## 2025-02-09 DIAGNOSIS — F51.01 PRIMARY INSOMNIA: ICD-10-CM

## 2025-02-11 RX ORDER — ZOLPIDEM TARTRATE 10 MG/1
10 TABLET ORAL NIGHTLY PRN
Qty: 30 TABLET | Refills: 0 | Status: SHIPPED | OUTPATIENT
Start: 2025-02-11 | End: 2025-03-13

## 2025-02-21 DIAGNOSIS — G89.29 CHRONIC BILATERAL LOW BACK PAIN WITH RIGHT-SIDED SCIATICA: ICD-10-CM

## 2025-02-21 DIAGNOSIS — M54.41 CHRONIC BILATERAL LOW BACK PAIN WITH RIGHT-SIDED SCIATICA: ICD-10-CM

## 2025-02-21 RX ORDER — HYDROCODONE BITARTRATE AND ACETAMINOPHEN 5; 325 MG/1; MG/1
0.5 TABLET ORAL EVERY 6 HOURS PRN
Qty: 60 TABLET | Refills: 0 | Status: SHIPPED | OUTPATIENT
Start: 2025-02-21 | End: 2025-03-23

## 2025-03-04 ENCOUNTER — APPOINTMENT (OUTPATIENT)
Dept: MEDICAL GROUP | Facility: PHYSICIAN GROUP | Age: 70
End: 2025-03-04
Payer: COMMERCIAL

## 2025-03-05 ENCOUNTER — APPOINTMENT (OUTPATIENT)
Dept: MEDICAL GROUP | Facility: PHYSICIAN GROUP | Age: 70
End: 2025-03-05
Payer: COMMERCIAL

## 2025-03-05 VITALS
WEIGHT: 181 LBS | OXYGEN SATURATION: 97 % | SYSTOLIC BLOOD PRESSURE: 132 MMHG | RESPIRATION RATE: 16 BRPM | DIASTOLIC BLOOD PRESSURE: 80 MMHG | HEART RATE: 80 BPM | TEMPERATURE: 97.8 F | HEIGHT: 66 IN | BODY MASS INDEX: 29.09 KG/M2

## 2025-03-05 DIAGNOSIS — M54.41 CHRONIC BILATERAL LOW BACK PAIN WITH RIGHT-SIDED SCIATICA: ICD-10-CM

## 2025-03-05 DIAGNOSIS — G89.29 CHRONIC BILATERAL LOW BACK PAIN WITH RIGHT-SIDED SCIATICA: ICD-10-CM

## 2025-03-05 DIAGNOSIS — F51.01 PRIMARY INSOMNIA: ICD-10-CM

## 2025-03-05 PROBLEM — B00.1 COLD SORE: Status: RESOLVED | Noted: 2024-12-13 | Resolved: 2025-03-05

## 2025-03-05 PROBLEM — H61.22 CERUMINOSIS, LEFT: Status: RESOLVED | Noted: 2024-06-18 | Resolved: 2025-03-05

## 2025-03-05 PROCEDURE — 3079F DIAST BP 80-89 MM HG: CPT | Performed by: FAMILY MEDICINE

## 2025-03-05 PROCEDURE — 3075F SYST BP GE 130 - 139MM HG: CPT | Performed by: FAMILY MEDICINE

## 2025-03-05 PROCEDURE — 99213 OFFICE O/P EST LOW 20 MIN: CPT | Performed by: FAMILY MEDICINE

## 2025-03-05 RX ORDER — CONJUGATED ESTROGENS 0.62 MG/G
0.5 CREAM VAGINAL
Qty: 30 G | Refills: 3 | Status: SHIPPED | OUTPATIENT
Start: 2025-03-05

## 2025-03-05 RX ORDER — ZOLPIDEM TARTRATE 10 MG/1
10 TABLET ORAL NIGHTLY PRN
Qty: 30 TABLET | Refills: 2 | Status: SHIPPED | OUTPATIENT
Start: 2025-03-11 | End: 2025-06-09

## 2025-03-05 ASSESSMENT — PATIENT HEALTH QUESTIONNAIRE - PHQ9: CLINICAL INTERPRETATION OF PHQ2 SCORE: 0

## 2025-03-05 ASSESSMENT — FIBROSIS 4 INDEX: FIB4 SCORE: 1.49

## 2025-03-05 NOTE — ASSESSMENT & PLAN NOTE
This is a chronic problem.  She we signed agreement today.  Her urine drug screen is current.  She is not due for refill on her medication until later this month.

## 2025-03-05 NOTE — ASSESSMENT & PLAN NOTE
This is a chronic problem.  Patient is here for her 90-day visit.  Her Ambien will be due next week so we will renew it.  Her drug screen is current.  Will also do a new controlled substance agreement form today.

## 2025-03-05 NOTE — PROGRESS NOTES
Subjective:     CC: Here for her 3-month visit on medications.    HPI:   Lurdes presents today with the following medical concerns:    Insomnia  This is a chronic problem.  Patient is here for her 90-day visit.  Her Ambien will be due next week so we will renew it.  Her drug screen is current.  Will also do a new controlled substance agreement form today.    Chronic bilateral low back pain with right-sided sciatica  This is a chronic problem.  She we signed agreement today.  Her urine drug screen is current.  She is not due for refill on her medication until later this month.    Past Medical History:   Diagnosis Date    Back pain     Insomnia        Social History     Tobacco Use    Smoking status: Former     Current packs/day: 0.00     Average packs/day: 1 pack/day for 15.0 years (15.0 ttl pk-yrs)     Types: Cigarettes     Start date: 10/26/1981     Quit date: 10/26/1996     Years since quittin.3    Smokeless tobacco: Never   Vaping Use    Vaping status: Never Used   Substance Use Topics    Alcohol use: Yes     Comment: 4 times yearly    Drug use: No       Current Outpatient Medications Ordered in Epic   Medication Sig Dispense Refill    estrogens, conjugated (PREMARIN) 0.625 MG/GM Cream Insert 0.5 g into the vagina two times a week. 30 g 3    [START ON 3/11/2025] zolpidem (AMBIEN) 10 MG Tab Take 1 Tablet by mouth at bedtime as needed for Sleep for up to 90 days. 30 Tablet 2    HYDROcodone-acetaminophen (NORCO) 5-325 MG Tab per tablet Take 0.5 Tablets by mouth every 6 hours as needed (pain) for up to 30 days. 60 Tablet 0    estradiol (ESTRACE) 1 MG Tab Take 2 Tablets by mouth every day. 180 Tablet 3    valacyclovir (VALTREX) 1 GM Tab 2 po bid for 1 day prn outbreak 20 Tablet 5    Cyanocobalamin (B-12) 1000 MCG Tab Take  by mouth.      loratadine (CLARITIN) 10 MG Tab Take 10 mg by mouth every day.      Naproxen Sodium (ALEVE PO) Take  by mouth.      calcium carbonate (TUMS) 500 MG Chew Tab Chew 500 mg every day.   "    Cholecalciferol (VITAMIN D PO) Take 25 mcg by mouth every day.      Multiple Vitamin (MULTI-VITAMINS) Tab Take 1 tablet by mouth.       No current Epic-ordered facility-administered medications on file.       Allergies:  Pcn [penicillins], Sulfa drugs, and Clindamycin    Health Maintenance: Completed    ROS:  Gen: no fevers/chills, no changes in weight  Eyes: no changes in vision  ENT: no sore throat, no hearing loss, no bloody nose  Pulm: no sob, no cough  CV: no chest pain, no palpitations  GI: no nausea/vomiting, no diarrhea  : no dysuria  MSk: no myalgias  Skin: no rash  Neuro: no headaches, no numbness/tingling  Heme/Lymph: no easy bruising      Objective:       Exam:  /80 (BP Location: Left arm, Patient Position: Sitting, BP Cuff Size: Adult)   Pulse 80   Temp 36.6 °C (97.8 °F) (Temporal)   Resp 16   Ht 1.676 m (5' 6\")   Wt 82.1 kg (181 lb)   SpO2 97%   BMI 29.21 kg/m²  Body mass index is 29.21 kg/m².    Gen: Alert and oriented, No apparent distress.  Lungs: Normal effort,   Ext: No clubbing, cyanosis, edema.        Assessment & Plan:     69 y.o. female with the following -     1. Primary insomnia  This is a chronic problem.  Agreement form signed.  Medication renewed for 90 days.  - Controlled Substance Treatment Agreement  - zolpidem (AMBIEN) 10 MG Tab; Take 1 Tablet by mouth at bedtime as needed for Sleep for up to 90 days.  Dispense: 30 Tablet; Refill: 2    2. Chronic bilateral low back pain with right-sided sciatica  This is a chronic problem.  Agreement signed.  Will refill her medicine later this month when it comes due.  - Controlled Substance Treatment Agreement      Return in about 3 months (around 6/5/2025) for Long.    Please note that this dictation was created using voice recognition software. I have made every reasonable attempt to correct obvious errors, but I expect that there are errors of grammar and possibly content that I did not discover before finalizing the " note.

## 2025-03-21 ENCOUNTER — PATIENT MESSAGE (OUTPATIENT)
Dept: MEDICAL GROUP | Facility: PHYSICIAN GROUP | Age: 70
End: 2025-03-21
Payer: COMMERCIAL

## 2025-03-21 DIAGNOSIS — G89.29 CHRONIC BILATERAL LOW BACK PAIN WITH RIGHT-SIDED SCIATICA: ICD-10-CM

## 2025-03-21 DIAGNOSIS — M54.41 CHRONIC BILATERAL LOW BACK PAIN WITH RIGHT-SIDED SCIATICA: ICD-10-CM

## 2025-03-21 RX ORDER — HYDROCODONE BITARTRATE AND ACETAMINOPHEN 5; 325 MG/1; MG/1
0.5 TABLET ORAL EVERY 6 HOURS PRN
Qty: 60 TABLET | Refills: 0 | Status: SHIPPED | OUTPATIENT
Start: 2025-03-21 | End: 2025-04-20

## 2025-04-10 DIAGNOSIS — F51.01 PRIMARY INSOMNIA: ICD-10-CM

## 2025-04-11 RX ORDER — ZOLPIDEM TARTRATE 10 MG/1
10 TABLET ORAL NIGHTLY PRN
Qty: 30 TABLET | Refills: 1 | Status: SHIPPED | OUTPATIENT
Start: 2025-04-11 | End: 2025-07-10

## 2025-04-11 NOTE — TELEPHONE ENCOUNTER
Received request via: Patient    Was the patient seen in the last year in this department? Yes    Does the patient have an active prescription (recently filled or refills available) for medication(s) requested? No    Pharmacy Name: [id number] - [place] [facility] [place] [facility] [place]        Does the patient have alf Plus and need 100-day supply? (This applies to ALL medications) Patient does not have SCP

## 2025-04-18 ENCOUNTER — OFFICE VISIT (OUTPATIENT)
Dept: MEDICAL GROUP | Facility: PHYSICIAN GROUP | Age: 70
End: 2025-04-18
Payer: COMMERCIAL

## 2025-04-18 VITALS
RESPIRATION RATE: 16 BRPM | WEIGHT: 182 LBS | TEMPERATURE: 97.8 F | SYSTOLIC BLOOD PRESSURE: 126 MMHG | HEIGHT: 66 IN | HEART RATE: 78 BPM | BODY MASS INDEX: 29.25 KG/M2 | OXYGEN SATURATION: 98 % | DIASTOLIC BLOOD PRESSURE: 72 MMHG

## 2025-04-18 DIAGNOSIS — R00.2 PALPITATION: ICD-10-CM

## 2025-04-18 PROCEDURE — 3078F DIAST BP <80 MM HG: CPT | Performed by: FAMILY MEDICINE

## 2025-04-18 PROCEDURE — 99213 OFFICE O/P EST LOW 20 MIN: CPT | Performed by: FAMILY MEDICINE

## 2025-04-18 PROCEDURE — 3074F SYST BP LT 130 MM HG: CPT | Performed by: FAMILY MEDICINE

## 2025-04-18 ASSESSMENT — FIBROSIS 4 INDEX: FIB4 SCORE: 1.49

## 2025-04-18 NOTE — PROGRESS NOTES
Subjective:     CC: Here for more frequent palpitations and presyncope with.    HPI:   Lurdes presents today with the following medical concerns:    Palpitation  This is a chronic problem.  Patient had a more severe episode of palpitation about a week ago.  She states it was associated with multiple times of bending over at her waist doing things at her table.  Then she started to feel weak and dizzy with presyncope.  She made it to her bedroom and then started having the palpitations.  She states that she could feel her heart beating fast and very hard.  That lasted about 20 to 25 minutes before it suddenly subsided.  Then she felt better.  She has had about 3 episodes this year.  No chest pain with it.    Past Medical History:   Diagnosis Date    Back pain     Insomnia        Social History     Tobacco Use    Smoking status: Former     Current packs/day: 0.00     Average packs/day: 1 pack/day for 15.0 years (15.0 ttl pk-yrs)     Types: Cigarettes     Start date: 10/26/1981     Quit date: 10/26/1996     Years since quittin.4    Smokeless tobacco: Never   Vaping Use    Vaping status: Never Used   Substance Use Topics    Alcohol use: Yes     Comment: 4 times yearly    Drug use: No       Current Outpatient Medications Ordered in Epic   Medication Sig Dispense Refill    zolpidem (AMBIEN) 10 MG Tab Take 1 Tablet by mouth at bedtime as needed for Sleep for up to 90 days. 30 Tablet 1    HYDROcodone-acetaminophen (NORCO) 5-325 MG Tab per tablet Take 0.5 Tablets by mouth every 6 hours as needed (pain) for up to 30 days. 60 Tablet 0    estrogens, conjugated (PREMARIN) 0.625 MG/GM Cream Insert 0.5 g into the vagina two times a week. 30 g 3    estradiol (ESTRACE) 1 MG Tab Take 2 Tablets by mouth every day. 180 Tablet 3    valacyclovir (VALTREX) 1 GM Tab 2 po bid for 1 day prn outbreak 20 Tablet 5    Cyanocobalamin (B-12) 1000 MCG Tab Take  by mouth.      loratadine (CLARITIN) 10 MG Tab Take 10 mg by mouth every day.       "Naproxen Sodium (ALEVE PO) Take  by mouth.      calcium carbonate (TUMS) 500 MG Chew Tab Chew 500 mg every day.      Cholecalciferol (VITAMIN D PO) Take 25 mcg by mouth every day.      Multiple Vitamin (MULTI-VITAMINS) Tab Take 1 tablet by mouth.       No current Epic-ordered facility-administered medications on file.       Allergies:  Pcn [penicillins], Sulfa drugs, and Clindamycin    Health Maintenance: Completed    ROS:  Gen: no fevers/chills, no changes in weight  Eyes: no changes in vision  ENT: no sore throat, no hearing loss, no bloody nose  Pulm: no sob, no cough  CV: no chest pain,   GI: no nausea/vomiting, no diarrhea  : no dysuria  MSk: no myalgias  Skin: no rash  Neuro: no headaches, no numbness/tingling  Heme/Lymph: no easy bruising      Objective:       Exam:  /72 (BP Location: Left arm, Patient Position: Sitting, BP Cuff Size: Adult)   Pulse 78   Temp 36.6 °C (97.8 °F) (Temporal)   Resp 16   Ht 1.676 m (5' 6\")   Wt 82.6 kg (182 lb)   SpO2 98%   BMI 29.38 kg/m²  Body mass index is 29.38 kg/m².    Gen: Alert and oriented, No apparent distress.  Lungs: Normal effort,  CV: Regular rate and rhythm. No murmurs, rubs, or gallops.  Ext: No clubbing, cyanosis, edema.        Assessment & Plan:     69 y.o. female with the following -     1. Palpitation  This is a chronic problem and seems to be worsening.  I told her some of the presyncopal episodes could be from a vasovagal reaction from bending over but with the palpitations worsening she needs cardiac evaluation with probable Holter monitor.  In the meantime she is to try to hydrate a little better.  Avoid events that seem to bring it on.  And avoid stimulants.  If she has another episode that lasts that long and makes her feel like she is going to faint she should call 911.      Return if symptoms worsen or fail to improve.    Please note that this dictation was created using voice recognition software. I have made every reasonable attempt to " correct obvious errors, but I expect that there are errors of grammar and possibly content that I did not discover before finalizing the note.

## 2025-04-18 NOTE — ASSESSMENT & PLAN NOTE
This is a chronic problem.  Patient had a more severe episode of palpitation about a week ago.  She states it was associated with multiple times of bending over at her waist doing things at her table.  Then she started to feel weak and dizzy with presyncope.  She made it to her bedroom and then started having the palpitations.  She states that she could feel her heart beating fast and very hard.  That lasted about 20 to 25 minutes before it suddenly subsided.  Then she felt better.  She has had about 3 episodes this year.  No chest pain with it.

## 2025-04-21 DIAGNOSIS — G89.29 CHRONIC BILATERAL LOW BACK PAIN WITH RIGHT-SIDED SCIATICA: ICD-10-CM

## 2025-04-21 DIAGNOSIS — M54.41 CHRONIC BILATERAL LOW BACK PAIN WITH RIGHT-SIDED SCIATICA: ICD-10-CM

## 2025-04-22 ENCOUNTER — OFFICE VISIT (OUTPATIENT)
Dept: CARDIOLOGY | Facility: MEDICAL CENTER | Age: 70
End: 2025-04-22
Attending: FAMILY MEDICINE
Payer: COMMERCIAL

## 2025-04-22 VITALS
RESPIRATION RATE: 16 BRPM | WEIGHT: 184 LBS | SYSTOLIC BLOOD PRESSURE: 176 MMHG | BODY MASS INDEX: 29.57 KG/M2 | DIASTOLIC BLOOD PRESSURE: 96 MMHG | HEART RATE: 93 BPM | OXYGEN SATURATION: 96 % | HEIGHT: 66 IN

## 2025-04-22 DIAGNOSIS — R00.2 PALPITATION: ICD-10-CM

## 2025-04-22 LAB — EKG IMPRESSION: NORMAL

## 2025-04-22 PROCEDURE — 99214 OFFICE O/P EST MOD 30 MIN: CPT | Performed by: INTERNAL MEDICINE

## 2025-04-22 PROCEDURE — 3080F DIAST BP >= 90 MM HG: CPT | Performed by: INTERNAL MEDICINE

## 2025-04-22 PROCEDURE — 93005 ELECTROCARDIOGRAM TRACING: CPT | Mod: TC | Performed by: INTERNAL MEDICINE

## 2025-04-22 PROCEDURE — 99213 OFFICE O/P EST LOW 20 MIN: CPT | Performed by: INTERNAL MEDICINE

## 2025-04-22 PROCEDURE — 93010 ELECTROCARDIOGRAM REPORT: CPT | Performed by: INTERNAL MEDICINE

## 2025-04-22 PROCEDURE — 99204 OFFICE O/P NEW MOD 45 MIN: CPT | Performed by: INTERNAL MEDICINE

## 2025-04-22 PROCEDURE — 3077F SYST BP >= 140 MM HG: CPT | Performed by: INTERNAL MEDICINE

## 2025-04-22 RX ORDER — HYDROCODONE BITARTRATE AND ACETAMINOPHEN 5; 325 MG/1; MG/1
0.5 TABLET ORAL EVERY 6 HOURS PRN
Qty: 60 TABLET | Refills: 0 | Status: SHIPPED | OUTPATIENT
Start: 2025-04-22 | End: 2025-05-22

## 2025-04-22 RX ORDER — HYDROCODONE BITARTRATE AND ACETAMINOPHEN 10; 325 MG/1; MG/1
1-2 TABLET ORAL EVERY 6 HOURS PRN
COMMUNITY

## 2025-04-22 ASSESSMENT — ENCOUNTER SYMPTOMS
COUGH: 0
NEUROLOGICAL NEGATIVE: 1
FOCAL WEAKNESS: 0
DOUBLE VISION: 0
VOMITING: 0
PALPITATIONS: 1
NAUSEA: 0
EYES NEGATIVE: 1
WEIGHT LOSS: 0
WEAKNESS: 0
BLURRED VISION: 0
MUSCULOSKELETAL NEGATIVE: 1
PSYCHIATRIC NEGATIVE: 1
NERVOUS/ANXIOUS: 0
ABDOMINAL PAIN: 0
RESPIRATORY NEGATIVE: 1
CONSTITUTIONAL NEGATIVE: 1
DIZZINESS: 0
SHORTNESS OF BREATH: 0
MYALGIAS: 0
DEPRESSION: 0
FEVER: 0
HEADACHES: 0
CHILLS: 0
GASTROINTESTINAL NEGATIVE: 1
CLAUDICATION: 0
BRUISES/BLEEDS EASILY: 0

## 2025-04-22 ASSESSMENT — FIBROSIS 4 INDEX: FIB4 SCORE: 1.49

## 2025-04-22 NOTE — PROGRESS NOTES
Chief Complaint   Patient presents with    New Patient    Palpitations       Subjective     Lurdes Ramon is a 69 y.o. female who presents today as a consult from Rogelio Walls III for palpitations.    Thank you for allowing me to evaluate Mrs. Ramon, who as you know is a 69 year old female with previous tobacco abuse, family history of coronary artery disease with her father. She has been experiencing palpitations. She has had 7 times in the past 12 years. She describes her palpitations to be severe palpitation sensations lasting up to 15 minutes. She admits to dizziness or near syncope, shortness of breath. She denies associated chest pain, diaphoresis, nausea and vomiting. Her palpitations are aggravated by emotional stress, bending and are alleviated by sitting still. She has been inactive due to back pain. She works as a online .     Past Medical History:   Diagnosis Date    Back pain     Insomnia      Past Surgical History:   Procedure Laterality Date    HYSTERECTOMY, TOTAL ABDOMINAL  1994    DENTAL EXTRACTION(S)      LITHOTRIPSY      OTHER ORTHOPEDIC SURGERY      back surgery x 2     Family History   Problem Relation Age of Onset    Cancer Sister 58        colorectal    Heart Attack Father     Heart Disease Brother 61    Cancer Mother         brain     Social History     Socioeconomic History    Marital status:      Spouse name: Not on file    Number of children: Not on file    Years of education: Not on file    Highest education level: Master's degree (e.g., MA, MS, Julieth, MEd, MSW, BRIJESH)   Occupational History    Not on file   Tobacco Use    Smoking status: Former     Current packs/day: 0.00     Average packs/day: 1 pack/day for 15.0 years (15.0 ttl pk-yrs)     Types: Cigarettes     Start date: 10/26/1981     Quit date: 10/26/1996     Years since quittin.5    Smokeless tobacco: Never   Vaping Use    Vaping status: Never Used   Substance and Sexual Activity     Alcohol use: Yes     Comment: 4 times yearly    Drug use: No    Sexual activity: Not Currently   Other Topics Concern    Not on file   Social History Narrative    Not on file     Social Drivers of Health     Financial Resource Strain: Medium Risk (5/11/2022)    Overall Financial Resource Strain (CARDIA)     Difficulty of Paying Living Expenses: Somewhat hard   Food Insecurity: Food Insecurity Present (5/11/2022)    Hunger Vital Sign     Worried About Running Out of Food in the Last Year: Sometimes true     Ran Out of Food in the Last Year: Never true   Transportation Needs: No Transportation Needs (5/11/2022)    PRAPARE - Transportation     Lack of Transportation (Medical): No     Lack of Transportation (Non-Medical): No   Physical Activity: Inactive (5/11/2022)    Exercise Vital Sign     Days of Exercise per Week: 0 days     Minutes of Exercise per Session: 10 min   Stress: Stress Concern Present (5/11/2022)    [employer] - Occupational Stress Questionnaire     Feeling of Stress : To some extent   Social Connections: Moderately Isolated (5/11/2022)    Social Connection and Isolation Panel [NHANES]     Frequency of Communication with Friends and Family: Twice a week     Frequency of Social Gatherings with Friends and Family: Once a week     Attends [employer] Services: Never     Active Member of Clubs or Organizations: Yes     Attends Club or Organization Meetings: Never     Marital Status:    Intimate Partner Violence: Not on file   Housing Stability: Low Risk  (5/11/2022)    Housing Stability Vital Sign     Unable to Pay for Housing in the Last Year: No     Number of Places Lived in the Last Year: 1     Unstable Housing in the Last Year: No     Allergies   Allergen Reactions    Pcn [Penicillins] Hives    Sulfa Drugs Hives    Clindamycin Shortness of Breath and Swelling     (Medications reviewed.)  Outpatient Encounter Medications as of 4/22/2025   Medication Sig Dispense Refill     HYDROcodone/acetaminophen (NORCO)  MG Tab Take 1-2 Tablets by mouth every 6 hours as needed.      zolpidem (AMBIEN) 10 MG Tab Take 1 Tablet by mouth at bedtime as needed for Sleep for up to 90 days. 30 Tablet 1    estrogens, conjugated (PREMARIN) 0.625 MG/GM Cream Insert 0.5 g into the vagina two times a week. 30 g 3    estradiol (ESTRACE) 1 MG Tab Take 2 Tablets by mouth every day. 180 Tablet 3    valacyclovir (VALTREX) 1 GM Tab 2 po bid for 1 day prn outbreak 20 Tablet 5    Cyanocobalamin (B-12) 1000 MCG Tab Take  by mouth.      loratadine (CLARITIN) 10 MG Tab Take 10 mg by mouth every day.      Naproxen Sodium (ALEVE PO) Take  by mouth.      calcium carbonate (TUMS) 500 MG Chew Tab Chew 500 mg every day.      Cholecalciferol (VITAMIN D PO) Take 25 mcg by mouth every day.      Multiple Vitamin (MULTI-VITAMINS) Tab Take 1 tablet by mouth.       No facility-administered encounter medications on file as of 4/22/2025.     Review of Systems   Constitutional: Negative.  Negative for chills, fever, malaise/fatigue and weight loss.   HENT: Negative.  Negative for hearing loss.    Eyes: Negative.  Negative for blurred vision and double vision.   Respiratory: Negative.  Negative for cough and shortness of breath.    Cardiovascular:  Positive for palpitations. Negative for chest pain, claudication and leg swelling.   Gastrointestinal: Negative.  Negative for abdominal pain, nausea and vomiting.   Genitourinary: Negative.  Negative for dysuria and urgency.   Musculoskeletal: Negative.  Negative for joint pain and myalgias.   Skin: Negative.  Negative for itching and rash.   Neurological: Negative.  Negative for dizziness, focal weakness, weakness and headaches.   Endo/Heme/Allergies: Negative.  Does not bruise/bleed easily.   Psychiatric/Behavioral: Negative.  Negative for depression. The patient is not nervous/anxious.               Objective     BP (!) 176/96 (BP Location: Left arm, Patient Position: Sitting, BP  "Cuff Size: Adult)   Pulse 93   Resp 16   Ht 1.676 m (5' 6\")   Wt 83.5 kg (184 lb)   SpO2 96%   BMI 29.70 kg/m²     Physical Exam  Constitutional:       Appearance: Normal appearance. She is well-developed and normal weight.   HENT:      Head: Normocephalic and atraumatic.   Neck:      Vascular: No JVD.   Cardiovascular:      Rate and Rhythm: Normal rate and regular rhythm.      Heart sounds: Normal heart sounds.   Pulmonary:      Effort: Pulmonary effort is normal.      Breath sounds: Normal breath sounds.   Abdominal:      General: Bowel sounds are normal.      Palpations: Abdomen is soft.      Comments: No hepatosplenomegaly.   Musculoskeletal:         General: Normal range of motion.   Lymphadenopathy:      Cervical: No cervical adenopathy.   Skin:     General: Skin is warm and dry.   Neurological:      Mental Status: She is alert and oriented to person, place, and time.            CARDIAC STUDIES/PROCEDURES:    EKG was ordered for palpitations, performed on (04/22/25) was reviewed: EKG, personally interpreted shows sinus rhythm.     Laboratory results of (06/27/24) were reviewed. Cholesterol profile of 186/157/67/88 mg/dL noted.    Assessment & Plan     1. Palpitation  EKG        Medical Decision Making: Today's Assessment/Status/Plan:        Palpitations: She is a 69 year old female without cardiac history. She is experiencing palpitations as described above. We will perform a Zio patch monitor and an echocardiogram follow up with her.   Hypertension: The blood pressure measurement is high today, however, usually well controlled.  Health maintenance: She suffers with significant anxiety.     We will follow up in 3 months.    Thank you for this consult.           "

## 2025-04-29 ENCOUNTER — NON-PROVIDER VISIT (OUTPATIENT)
Dept: CARDIOLOGY | Facility: MEDICAL CENTER | Age: 70
End: 2025-04-29
Attending: INTERNAL MEDICINE
Payer: COMMERCIAL

## 2025-04-29 DIAGNOSIS — R00.2 PALPITATION: ICD-10-CM

## 2025-04-29 PROCEDURE — 93246 EXT ECG>7D<15D RECORDING: CPT

## 2025-04-29 NOTE — PROGRESS NOTES
Patient enrolled in the 14 day ePatch Holter monitoring program per Marcos Hagan MD.  >Office hook-up, serial # [id number].  >Currently pending EOS.  Patient was given the flex adapter with both kinds of electrodes just in case the patch irritates her skin too much. She is very skeptical of adhesives.

## 2025-05-17 ENCOUNTER — OFFICE VISIT (OUTPATIENT)
Dept: URGENT CARE | Facility: PHYSICIAN GROUP | Age: 70
End: 2025-05-17
Payer: COMMERCIAL

## 2025-05-17 VITALS
TEMPERATURE: 97.5 F | DIASTOLIC BLOOD PRESSURE: 88 MMHG | HEIGHT: 66 IN | HEART RATE: 88 BPM | WEIGHT: 183.53 LBS | OXYGEN SATURATION: 97 % | SYSTOLIC BLOOD PRESSURE: 140 MMHG | BODY MASS INDEX: 29.5 KG/M2 | RESPIRATION RATE: 16 BRPM

## 2025-05-17 DIAGNOSIS — R35.0 URINARY FREQUENCY: ICD-10-CM

## 2025-05-17 DIAGNOSIS — R39.89 BLADDER PAIN: Primary | ICD-10-CM

## 2025-05-17 DIAGNOSIS — R03.0 ELEVATED BLOOD PRESSURE READING WITHOUT DIAGNOSIS OF HYPERTENSION: ICD-10-CM

## 2025-05-17 DIAGNOSIS — R31.9 HEMATURIA, UNSPECIFIED TYPE: ICD-10-CM

## 2025-05-17 LAB
APPEARANCE UR: NORMAL
BILIRUB UR STRIP-MCNC: NEGATIVE MG/DL
COLOR UR AUTO: NORMAL
GLUCOSE UR STRIP.AUTO-MCNC: NEGATIVE MG/DL
KETONES UR STRIP.AUTO-MCNC: NEGATIVE MG/DL
LEUKOCYTE ESTERASE UR QL STRIP.AUTO: NEGATIVE
NITRITE UR QL STRIP.AUTO: NEGATIVE
PH UR STRIP.AUTO: 7 [PH] (ref 5–8)
PROT UR QL STRIP: NEGATIVE MG/DL
RBC UR QL AUTO: NORMAL
SP GR UR STRIP.AUTO: 1.02
UROBILINOGEN UR STRIP-MCNC: 0.2 MG/DL

## 2025-05-17 PROCEDURE — 3077F SYST BP >= 140 MM HG: CPT

## 2025-05-17 PROCEDURE — 99214 OFFICE O/P EST MOD 30 MIN: CPT

## 2025-05-17 PROCEDURE — 81002 URINALYSIS NONAUTO W/O SCOPE: CPT

## 2025-05-17 PROCEDURE — 3079F DIAST BP 80-89 MM HG: CPT

## 2025-05-17 ASSESSMENT — ENCOUNTER SYMPTOMS
DIARRHEA: 0
VOMITING: 0
RESPIRATORY NEGATIVE: 1
FLANK PAIN: 0
FEVER: 0
CARDIOVASCULAR NEGATIVE: 1
WEAKNESS: 0
PSYCHIATRIC NEGATIVE: 1
NAUSEA: 0
ABDOMINAL PAIN: 0
MYALGIAS: 0
DIAPHORESIS: 0
CHILLS: 0
EYES NEGATIVE: 1

## 2025-05-17 ASSESSMENT — FIBROSIS 4 INDEX: FIB4 SCORE: 1.51

## 2025-05-17 NOTE — PATIENT INSTRUCTIONS
· Drink enough water and fluids to keep your urine clear or pale yellow.  · Avoid caffeine, tea, and carbonated beverages. They tend to irritate your bladder.  · Empty your bladder often. Avoid holding urine for long periods of time.  · Empty your bladder before and after sexual intercourse.  · After a bowel movement, women should cleanse from front to back. Use each tissue only once.

## 2025-05-17 NOTE — PROGRESS NOTES
"Subjective:   Lurdes Ramon is a 70 y.o. female who presents for Bladder Problem (Pressure ,pain , frequency x 1 day )      HPI  Patient complains of bladder pressure, nausea, urinary urgency and urinary frequency since yesterday    No hx of frequent UTI or kidney stones    Negative: Dysuria, vaginal discharge, suprapubic pain, general malaise, hematuria, recent urinary procedure, flank pain, fever, chills, vomiting      Review of Systems   Constitutional:  Negative for chills, diaphoresis, fever and malaise/fatigue.   HENT: Negative.     Eyes: Negative.    Respiratory: Negative.     Cardiovascular: Negative.    Gastrointestinal:  Negative for abdominal pain, diarrhea, nausea and vomiting.   Genitourinary:  Positive for frequency and urgency. Negative for dysuria, flank pain and hematuria.   Musculoskeletal:  Negative for myalgias.   Skin: Negative.    Neurological:  Negative for weakness.   Endo/Heme/Allergies: Negative.    Psychiatric/Behavioral: Negative.     All other systems reviewed and are negative.      Medical History:  Past Medical History[1]    Allergies:  Allergies[2]    Social history, surgical history, medications, and current problem list reviewed today in Epic.       Objective:     BP (!) 140/88   Pulse 88   Temp 36.4 °C (97.5 °F) (Temporal)   Resp 16   Ht 1.676 m (5' 6\")   Wt 83.3 kg (183 lb 8.5 oz)   SpO2 97%     Physical Exam  Vitals reviewed.   Constitutional:       General: She is not in acute distress.     Appearance: Normal appearance. She is not ill-appearing, toxic-appearing or diaphoretic.   Cardiovascular:      Rate and Rhythm: Normal rate.      Pulses: Normal pulses.   Pulmonary:      Effort: Pulmonary effort is normal.   Abdominal:      General: Abdomen is flat.      Palpations: Abdomen is soft.      Tenderness: There is no abdominal tenderness. There is no right CVA tenderness or left CVA tenderness.   Musculoskeletal:         General: Normal range of motion.      Cervical " back: Normal range of motion.   Skin:     General: Skin is warm.      Capillary Refill: Capillary refill takes less than 2 seconds.   Neurological:      General: No focal deficit present.      Mental Status: She is alert and oriented to person, place, and time.   Psychiatric:         Mood and Affect: Mood normal.         Behavior: Behavior normal.         Assessment/Plan:       Diagnosis and associated orders:     1. Bladder pain  POCT Urinalysis      2. Urinary frequency        3. Elevated blood pressure reading without diagnosis of hypertension        4. Hematuria, unspecified type             Comments/MDM:   I personally reviewed prior external notes and test results pertinent to today's visit as well as additional imaging and testing completed in clinic today.     Very pleasant 70-year-old female presenting with complaints of bladder pressure, nausea, urinary urgency and urinary frequency since yesterday.  In clinic UA was negative for leukocytes and nitrites.  UA was positive for blood.  No CVA tenderness, normal abdominal exam, no concern for urosepsis or kidney involvement.  We did discuss signs and symptoms of kidney stones and when to follow-up with the emergency room.  No antibiotics warranted at this time.  Patient encouraged to increase hydration and monitor symptoms.  We identified an elevated blood pressure in clinic today at 140/88. Patient does not have a diagnosis of hypertension. They are currently on NO medications for hypertension. We did discussed this elevated reading and the importance of monitoring it regularly, starting a log, and having it rechecked within the month.  They have no signs or symptoms associated with this.  We discussed the impact of elevated blood pressure on the body and that if it stays consistently elevated they may require medication management.    Patient is clinically stable at today's acute urgent care visit. Vital signs are normal and reassuring.  No acute distress  noted. Appropriate for outpatient management at this time. No red flag warnings noted.  Patient given strict instructions to follow up with emergency room if they develop any red flag warnings which were discussed in depth.  They verbalized understanding. Differential diagnosis, natural history, supportive care, and indications for immediate follow-up discussed. All questions answered. They agree with the plan of care.      Please note that this dictation was created using voice recognition software. I have made every reasonable attempt to correct obvious errors, but I expect that there are errors of grammar and possibly content that I did not discover before finalizing the note.              [1]   Past Medical History:  Diagnosis Date    Back pain     Insomnia    [2]   Allergies  Allergen Reactions    Pcn [Penicillins] Hives    Sulfa Drugs Hives    Clindamycin Shortness of Breath and Swelling

## 2025-05-19 ENCOUNTER — TELEPHONE (OUTPATIENT)
Dept: CARDIOLOGY | Facility: MEDICAL CENTER | Age: 70
End: 2025-05-19
Payer: COMMERCIAL

## 2025-05-19 ENCOUNTER — RESULTS FOLLOW-UP (OUTPATIENT)
Dept: CARDIOLOGY | Facility: MEDICAL CENTER | Age: 70
End: 2025-05-19
Payer: COMMERCIAL

## 2025-05-22 DIAGNOSIS — G89.29 CHRONIC BILATERAL LOW BACK PAIN WITH RIGHT-SIDED SCIATICA: ICD-10-CM

## 2025-05-22 DIAGNOSIS — M54.41 CHRONIC BILATERAL LOW BACK PAIN WITH RIGHT-SIDED SCIATICA: ICD-10-CM

## 2025-05-22 RX ORDER — HYDROCODONE BITARTRATE AND ACETAMINOPHEN 5; 325 MG/1; MG/1
0.5 TABLET ORAL EVERY 6 HOURS PRN
Qty: 60 TABLET | Refills: 0 | Status: SHIPPED | OUTPATIENT
Start: 2025-05-22 | End: 2025-06-21

## 2025-05-22 NOTE — TELEPHONE ENCOUNTER
Received request via: Patient    Was the patient seen in the last year in this department? Yes    Does the patient have an active prescription (recently filled or refills available) for medication(s) requested? No    Pharmacy Name:  [facility] #[id number] - [place], [place] [facility] [address] [place] [facility] [address]     Does the patient have prison Plus and need 100-day supply? (This applies to ALL medications) Patient does not have SCP

## 2025-05-28 ENCOUNTER — ANCILLARY PROCEDURE (OUTPATIENT)
Dept: CARDIOLOGY | Facility: MEDICAL CENTER | Age: 70
End: 2025-05-28
Attending: INTERNAL MEDICINE
Payer: COMMERCIAL

## 2025-05-28 ENCOUNTER — APPOINTMENT (OUTPATIENT)
Dept: MEDICAL GROUP | Facility: PHYSICIAN GROUP | Age: 70
End: 2025-05-28
Payer: COMMERCIAL

## 2025-05-28 VITALS
TEMPERATURE: 98.2 F | BODY MASS INDEX: 29.47 KG/M2 | SYSTOLIC BLOOD PRESSURE: 126 MMHG | RESPIRATION RATE: 16 BRPM | OXYGEN SATURATION: 97 % | WEIGHT: 183.4 LBS | DIASTOLIC BLOOD PRESSURE: 76 MMHG | HEART RATE: 80 BPM | HEIGHT: 66 IN

## 2025-05-28 DIAGNOSIS — R00.2 PALPITATION: ICD-10-CM

## 2025-05-28 DIAGNOSIS — E55.9 AVITAMINOSIS D: Primary | ICD-10-CM

## 2025-05-28 DIAGNOSIS — F51.01 PRIMARY INSOMNIA: ICD-10-CM

## 2025-05-28 DIAGNOSIS — Z12.31 ENCOUNTER FOR SCREENING MAMMOGRAM FOR MALIGNANT NEOPLASM OF BREAST: Primary | ICD-10-CM

## 2025-05-28 DIAGNOSIS — Z00.00 ADULT GENERAL MEDICAL EXAM: ICD-10-CM

## 2025-05-28 DIAGNOSIS — F41.9 ANXIETY: ICD-10-CM

## 2025-05-28 DIAGNOSIS — R53.82 CHRONIC FATIGUE: ICD-10-CM

## 2025-05-28 PROBLEM — M25.531 RIGHT WRIST PAIN: Status: RESOLVED | Noted: 2023-07-20 | Resolved: 2025-05-28

## 2025-05-28 LAB
LV EJECT FRACT  99904: 60
LV EJECT FRACT MOD 2C 99903: 60.11
LV EJECT FRACT MOD 4C 99902: 61.76
LV EJECT FRACT MOD BP 99901: 61.07

## 2025-05-28 PROCEDURE — 93306 TTE W/DOPPLER COMPLETE: CPT

## 2025-05-28 PROCEDURE — 93306 TTE W/DOPPLER COMPLETE: CPT | Mod: 26 | Performed by: INTERNAL MEDICINE

## 2025-05-28 PROCEDURE — 3074F SYST BP LT 130 MM HG: CPT | Performed by: FAMILY MEDICINE

## 2025-05-28 PROCEDURE — 99214 OFFICE O/P EST MOD 30 MIN: CPT | Performed by: FAMILY MEDICINE

## 2025-05-28 PROCEDURE — 3078F DIAST BP <80 MM HG: CPT | Performed by: FAMILY MEDICINE

## 2025-05-28 RX ORDER — ALPRAZOLAM 0.5 MG
TABLET ORAL
Qty: 15 TABLET | Refills: 0 | Status: SHIPPED | OUTPATIENT
Start: 2025-05-28 | End: 2025-07-28

## 2025-05-28 ASSESSMENT — FIBROSIS 4 INDEX: FIB4 SCORE: 1.51

## 2025-05-28 NOTE — PROGRESS NOTES
Subjective:     CC: Here for several issues.    HPI:   Lurdes presents today with the following medical concerns:    Insomnia  This is a chronic problem.  Patient is here for her 3-month follow-up.  There is been no changes in her use of the medication.  She does have a prescription that should last until around June 12.  Then we will renew the medication.    Anxiety  This chronic issue.  Patient states she has troubles with flight anxiety.  She has an old prescription of alprazolam would like a refill for possible upcoming trip.    Palpitation  This is a chronic problem.  She did see the cardiologist.  A Holter monitor was done that did not show any dysrhythmias.  She did try to cause the palpitation during that and did have a heart rate go up to 169 which was due to her exertion.  She has a pending echocardiogram as part of the workup and will see the cardiologist in July.  In retrospect she states she was using some caffeine when she felt fatigued and tired rather than drinking coffee.  She thinks that might have triggered her to start to feel lightheaded and then get the palpitation.  I told her that definitely could be from the caffeine and the gum and if she uses that she should chew it much slower.    Past Medical History[1]    Social History[2]    Current Medications and Prescriptions Ordered in Epic[3]    Allergies:  Pcn [penicillins], Sulfa drugs, and Clindamycin    Health Maintenance: Completed    ROS:  Gen: no fevers/chills, no changes in weight  Eyes: no changes in vision  ENT: no sore throat, no hearing loss, no bloody nose  Pulm: no sob, no cough  CV: no chest pain, no palpitations  GI: no nausea/vomiting, no diarrhea  : no dysuria  MSk: no myalgias  Skin: no rash  Neuro: no headaches, no numbness/tingling  Heme/Lymph: no easy bruising      Objective:       Exam:  /76 (BP Location: Right arm, Patient Position: Sitting, BP Cuff Size: Adult)   Pulse 80   Temp 36.8 °C (98.2 °F) (Temporal)   Resp  "16   Ht 1.676 m (5' 6\")   Wt 83.2 kg (183 lb 6.4 oz)   SpO2 97%   BMI 29.60 kg/m²  Body mass index is 29.6 kg/m².    Gen: Alert and oriented, No apparent distress.  Ears:    Ear canals and TMs are clear.  Neck: Neck is supple without lymphadenopathy.  Lungs: Normal effort,   Ext: No clubbing, cyanosis, edema.    Psych: Patient is alert cooperative.  No unusual thought was expressed.  Insight and judgment is good.  Does not appear to be overtly anxious or depressed on today's visit.    Assessment & Plan:     70 y.o. female with the following -     1. Anxiety  This is a chronic problem.  Medication renewed to be used before flying.  - ALPRAZolam (XANAX) 0.5 MG Tab; Take 1 po before flying. No more than every 8 hours as needed.  Dispense: 15 Tablet; Refill: 0    2. Palpitation  This is a chronic problem.  She has not had any recent events.  I told her it definitely could be due to her caffeine chewing gum.  Will continue to follow.    3. Primary insomnia  This is a chronic problem.  Medication will be renewed when it comes up for renewal.    4. Encounter for screening mammogram for malignant neoplasm of breast (Primary)  This is a screening issue.  Mammogram ordered.  - MA-SCREENING MAMMO BILAT W/TOMOSYNTHESIS W/CAD; Future    32 minutes spent with the patient discussing all her issues and concerns.  Return in about 3 months (around 8/28/2025) for Long.  We will preorder her annual labs and do her annual exam at next visit.    Please note that this dictation was created using voice recognition software. I have made every reasonable attempt to correct obvious errors, but I expect that there are errors of grammar and possibly content that I did not discover before finalizing the note.             [1]   Past Medical History:  Diagnosis Date    Back pain     Insomnia    [2]   Social History  Tobacco Use    Smoking status: Former     Current packs/day: 0.00     Average packs/day: 1 pack/day for 15.0 years (15.0 ttl " pk-yrs)     Types: Cigarettes     Start date: 10/26/1981     Quit date: 10/26/1996     Years since quittin.6    Smokeless tobacco: Never   Vaping Use    Vaping status: Never Used   Substance Use Topics    Alcohol use: Yes     Comment: 4 times yearly    Drug use: No   [3]   Current Outpatient Medications Ordered in Epic   Medication Sig Dispense Refill    ALPRAZolam (XANAX) 0.5 MG Tab Take 1 po before flying. No more than every 8 hours as needed. 15 Tablet 0    HYDROcodone-acetaminophen (NORCO) 5-325 MG Tab per tablet Take 0.5 Tablets by mouth every 6 hours as needed (pain) for up to 30 days. 60 Tablet 0    HYDROcodone/acetaminophen (NORCO)  MG Tab Take 1-2 Tablets by mouth every 6 hours as needed.      zolpidem (AMBIEN) 10 MG Tab Take 1 Tablet by mouth at bedtime as needed for Sleep for up to 90 days. 30 Tablet 1    estrogens, conjugated (PREMARIN) 0.625 MG/GM Cream Insert 0.5 g into the vagina two times a week. 30 g 3    estradiol (ESTRACE) 1 MG Tab Take 2 Tablets by mouth every day. 180 Tablet 3    valacyclovir (VALTREX) 1 GM Tab 2 po bid for 1 day prn outbreak 20 Tablet 5    Cyanocobalamin (B-12) 1000 MCG Tab Take  by mouth.      loratadine (CLARITIN) 10 MG Tab Take 10 mg by mouth every day.      Naproxen Sodium (ALEVE PO) Take  by mouth.      calcium carbonate (TUMS) 500 MG Chew Tab Chew 500 mg every day.      Cholecalciferol (VITAMIN D PO) Take 25 mcg by mouth every day.      Multiple Vitamin (MULTI-VITAMINS) Tab Take 1 tablet by mouth.       No current Epic-ordered facility-administered medications on file.

## 2025-05-28 NOTE — ASSESSMENT & PLAN NOTE
This is a chronic problem.  Patient is here for her 3-month follow-up.  There is been no changes in her use of the medication.  She does have a prescription that should last until around June 12.  Then we will renew the medication.

## 2025-05-28 NOTE — ASSESSMENT & PLAN NOTE
This chronic issue.  Patient states she has troubles with flight anxiety.  She has an old prescription of alprazolam would like a refill for possible upcoming trip.

## 2025-05-28 NOTE — ASSESSMENT & PLAN NOTE
This is a chronic problem.  She did see the cardiologist.  A Holter monitor was done that did not show any dysrhythmias.  She did try to cause the palpitation during that and did have a heart rate go up to 169 which was due to her exertion.  She has a pending echocardiogram as part of the workup and will see the cardiologist in July.  In retrospect she states she was using some caffeine when she felt fatigued and tired rather than drinking coffee.  She thinks that might have triggered her to start to feel lightheaded and then get the palpitation.  I told her that definitely could be from the caffeine and the gum and if she uses that she should chew it much slower.

## 2025-06-06 DIAGNOSIS — F51.01 PRIMARY INSOMNIA: ICD-10-CM

## 2025-06-06 RX ORDER — ZOLPIDEM TARTRATE 10 MG/1
10 TABLET ORAL NIGHTLY PRN
Qty: 30 TABLET | Refills: 2 | Status: SHIPPED | OUTPATIENT
Start: 2025-06-09 | End: 2025-09-07

## 2025-06-20 ENCOUNTER — PATIENT MESSAGE (OUTPATIENT)
Dept: MEDICAL GROUP | Facility: PHYSICIAN GROUP | Age: 70
End: 2025-06-20
Payer: COMMERCIAL

## 2025-06-20 DIAGNOSIS — G89.29 CHRONIC BILATERAL LOW BACK PAIN WITH RIGHT-SIDED SCIATICA: ICD-10-CM

## 2025-06-20 DIAGNOSIS — M54.41 CHRONIC BILATERAL LOW BACK PAIN WITH RIGHT-SIDED SCIATICA: ICD-10-CM

## 2025-06-20 RX ORDER — HYDROCODONE BITARTRATE AND ACETAMINOPHEN 5; 325 MG/1; MG/1
0.5 TABLET ORAL EVERY 6 HOURS PRN
Qty: 60 TABLET | Refills: 0 | Status: SHIPPED | OUTPATIENT
Start: 2025-06-20 | End: 2025-07-20

## 2025-06-20 RX ORDER — HYDROCODONE BITARTRATE AND ACETAMINOPHEN 5; 325 MG/1; MG/1
0.5 TABLET ORAL EVERY 6 HOURS PRN
Qty: 60 TABLET | Refills: 0 | Status: SHIPPED | OUTPATIENT
Start: 2025-06-20 | End: 2025-06-20 | Stop reason: SDUPTHER

## 2025-07-21 DIAGNOSIS — G89.29 CHRONIC BILATERAL LOW BACK PAIN WITH RIGHT-SIDED SCIATICA: ICD-10-CM

## 2025-07-21 DIAGNOSIS — M54.41 CHRONIC BILATERAL LOW BACK PAIN WITH RIGHT-SIDED SCIATICA: ICD-10-CM

## 2025-07-21 NOTE — TELEPHONE ENCOUNTER
Received request via: Patient    Was the patient seen in the last year in this department? Yes    Does the patient have an active prescription (recently filled or refills available) for medication(s) requested? No    Pharmacy Name: [id number] - [place], [place] [facility] [address] AT [facility] [address]     Does the patient have skilled nursing Plus and need 100-day supply? (This applies to ALL medications) Patient does not have SCP

## 2025-07-23 ENCOUNTER — HOSPITAL ENCOUNTER (OUTPATIENT)
Facility: MEDICAL CENTER | Age: 70
End: 2025-07-23
Attending: FAMILY MEDICINE
Payer: COMMERCIAL

## 2025-07-23 ENCOUNTER — APPOINTMENT (OUTPATIENT)
Dept: MEDICAL GROUP | Facility: PHYSICIAN GROUP | Age: 70
End: 2025-07-23
Payer: COMMERCIAL

## 2025-07-23 DIAGNOSIS — Z12.31 ENCOUNTER FOR SCREENING MAMMOGRAM FOR MALIGNANT NEOPLASM OF BREAST: ICD-10-CM

## 2025-07-23 PROCEDURE — 77063 BREAST TOMOSYNTHESIS BI: CPT

## 2025-07-23 RX ORDER — HYDROCODONE BITARTRATE AND ACETAMINOPHEN 5; 325 MG/1; MG/1
0.5 TABLET ORAL EVERY 6 HOURS PRN
Qty: 60 TABLET | Refills: 0 | Status: SHIPPED | OUTPATIENT
Start: 2025-07-23 | End: 2025-08-22

## 2025-07-25 DIAGNOSIS — R92.8 ABNORMAL MAMMOGRAM OF BOTH BREASTS: Primary | ICD-10-CM

## 2025-07-28 ENCOUNTER — OFFICE VISIT (OUTPATIENT)
Facility: MEDICAL CENTER | Age: 70
End: 2025-07-28
Attending: INTERNAL MEDICINE
Payer: COMMERCIAL

## 2025-07-28 VITALS
OXYGEN SATURATION: 95 % | HEIGHT: 66 IN | WEIGHT: 179 LBS | SYSTOLIC BLOOD PRESSURE: 132 MMHG | DIASTOLIC BLOOD PRESSURE: 84 MMHG | BODY MASS INDEX: 28.77 KG/M2 | HEART RATE: 78 BPM | RESPIRATION RATE: 16 BRPM

## 2025-07-28 DIAGNOSIS — Z00.6 CLINICAL TRIAL PARTICIPANT: ICD-10-CM

## 2025-07-28 DIAGNOSIS — R00.2 PALPITATION: Primary | ICD-10-CM

## 2025-07-28 PROCEDURE — 99214 OFFICE O/P EST MOD 30 MIN: CPT | Performed by: INTERNAL MEDICINE

## 2025-07-28 PROCEDURE — 99212 OFFICE O/P EST SF 10 MIN: CPT | Performed by: INTERNAL MEDICINE

## 2025-07-28 PROCEDURE — 3079F DIAST BP 80-89 MM HG: CPT | Performed by: INTERNAL MEDICINE

## 2025-07-28 PROCEDURE — 3075F SYST BP GE 130 - 139MM HG: CPT | Performed by: INTERNAL MEDICINE

## 2025-07-28 ASSESSMENT — ENCOUNTER SYMPTOMS
FOCAL WEAKNESS: 0
PSYCHIATRIC NEGATIVE: 1
HEADACHES: 0
CARDIOVASCULAR NEGATIVE: 1
EYES NEGATIVE: 1
RESPIRATORY NEGATIVE: 1
NERVOUS/ANXIOUS: 0
DEPRESSION: 0
DIZZINESS: 0
WEAKNESS: 0
CHILLS: 0
NEUROLOGICAL NEGATIVE: 1
WEIGHT LOSS: 0
CONSTITUTIONAL NEGATIVE: 1
PALPITATIONS: 0
ABDOMINAL PAIN: 0
CLAUDICATION: 0
BLURRED VISION: 0
MYALGIAS: 0
BACK PAIN: 1
SHORTNESS OF BREATH: 0
NAUSEA: 0
DOUBLE VISION: 0
VOMITING: 0
FEVER: 0
COUGH: 0
BRUISES/BLEEDS EASILY: 0
GASTROINTESTINAL NEGATIVE: 1

## 2025-07-28 ASSESSMENT — FIBROSIS 4 INDEX: FIB4 SCORE: 1.51

## 2025-07-28 NOTE — PROGRESS NOTES
Chief Complaint   Patient presents with    Palpitations       Subjective     Lurdes Ramon is a 70 y.o. female who presents today for follow up of palpitations.    Since the patient's last visit on 25, she has been doing well clinically from cardiac standpoint. She admit to .resolution of palpitations with discontinuation of caffeine gum. She denies fatigue, chest pain, shortness of breath, lower extremity edema, dizziness or syncope. She has been inactive due to back pain.     Past Medical History[1]  Past Surgical History[2]  Family History   Problem Relation Age of Onset    Cancer Sister 58        colorectal    Heart Attack Father     Heart Disease Brother 61    Cancer Mother         brain     Social History     Socioeconomic History    Marital status:      Spouse name: Not on file    Number of children: Not on file    Years of education: Not on file    Highest education level: Master's degree (e.g., MA, MS, Julieth, MEd, MSW, BRIJESH)   Occupational History    Not on file   Tobacco Use    Smoking status: Former     Current packs/day: 0.00     Average packs/day: 1 pack/day for 15.0 years (15.0 ttl pk-yrs)     Types: Cigarettes     Start date: 10/26/1981     Quit date: 10/26/1996     Years since quittin.7    Smokeless tobacco: Never   Vaping Use    Vaping status: Never Used   Substance and Sexual Activity    Alcohol use: Yes     Comment: 4 times yearly    Drug use: No    Sexual activity: Not Currently   Other Topics Concern    Not on file   Social History Narrative    Not on file     Social Drivers of Health     Financial Resource Strain: Medium Risk (2022)    Overall Financial Resource Strain (CARDIA)     Difficulty of Paying Living Expenses: Somewhat hard   Food Insecurity: Food Insecurity Present (2022)    Hunger Vital Sign     Worried About Running Out of Food in the Last Year: Sometimes true     Ran Out of Food in the Last Year: Never true   Transportation Needs: No Transportation  Needs (5/11/2022)    PRAPARE - Transportation     Lack of Transportation (Medical): No     Lack of Transportation (Non-Medical): No   Physical Activity: Inactive (5/11/2022)    Exercise Vital Sign     Days of Exercise per Week: 0 days     Minutes of Exercise per Session: 10 min   Stress: Stress Concern Present (5/11/2022)    [employer] - Occupational Stress Questionnaire     Feeling of Stress : To some extent   Social Connections: Moderately Isolated (5/11/2022)    Social Connection and Isolation Panel [NHANES]     Frequency of Communication with Friends and Family: Twice a week     Frequency of Social Gatherings with Friends and Family: Once a week     Attends [employer] Services: Never     Active Member of Clubs or Organizations: Yes     Attends Club or Organization Meetings: Never     Marital Status:    Intimate Partner Violence: Not on file   Housing Stability: Low Risk  (5/11/2022)    Housing Stability Vital Sign     Unable to Pay for Housing in the Last Year: No     Number of Places Lived in the Last Year: 1     Unstable Housing in the Last Year: No     Allergies[3]  Encounter Medications[4]  Review of Systems   Constitutional: Negative.  Negative for chills, fever, malaise/fatigue and weight loss.   HENT: Negative.  Negative for hearing loss.    Eyes: Negative.  Negative for blurred vision and double vision.   Respiratory: Negative.  Negative for cough and shortness of breath.    Cardiovascular: Negative.  Negative for chest pain, palpitations, claudication and leg swelling.   Gastrointestinal: Negative.  Negative for abdominal pain, nausea and vomiting.   Genitourinary: Negative.  Negative for dysuria and urgency.   Musculoskeletal:  Positive for back pain. Negative for joint pain and myalgias.   Skin: Negative.  Negative for itching and rash.   Neurological: Negative.  Negative for dizziness, focal weakness, weakness and headaches.   Endo/Heme/Allergies: Negative.  Does  "not bruise/bleed easily.   Psychiatric/Behavioral: Negative.  Negative for depression. The patient is not nervous/anxious.               Objective     /84 (BP Location: Left arm, Patient Position: Sitting, BP Cuff Size: Adult)   Pulse 78   Resp 16   Ht 1.676 m (5' 6\")   Wt 81.2 kg (179 lb)   SpO2 95%   BMI 28.89 kg/m²     Physical Exam  Constitutional:       Appearance: Normal appearance. She is well-developed and normal weight.   HENT:      Head: Normocephalic and atraumatic.   Neck:      Vascular: No JVD.   Cardiovascular:      Rate and Rhythm: Normal rate and regular rhythm.      Heart sounds: Normal heart sounds.   Pulmonary:      Effort: Pulmonary effort is normal.      Breath sounds: Normal breath sounds.   Abdominal:      General: Bowel sounds are normal.      Palpations: Abdomen is soft.      Comments: No hepatosplenomegaly.   Musculoskeletal:         General: Normal range of motion.   Lymphadenopathy:      Cervical: No cervical adenopathy.   Skin:     General: Skin is warm and dry.   Neurological:      Mental Status: She is alert and oriented to person, place, and time.            CARDIAC STUDIES/PROCEDURES:    ECHOCARDIOGRAM CONCLUSIONS (05/28/25)  No prior study is available for comparison.   Normal left ventricular systolic function.  The left ventricular ejection fraction is visually estimated to be 60%.  Mild mitral regurgitation.  (study result reviewed)      EKG performed on (04/22/25) EKG shows sinus rhythm.     Laboratory results of (06/27/24) Cholesterol profile of 186/157/67/88 mg/dL noted.    SWARTZ ePATCH CONCLUSIONS (04/29/25--05/13/25)  ePATCH study showing predominately sinus rhythm with maximum rate of 169 bpm, minimum rate of 73 bpm, average of 55 bpm.  Atrial fibrillation: None.  Supraventricular tachycardia: 29 episodes of supraventricular tachycardia/atrial tachycardia with fastest interval lasting 4 beats with a max rate of 169 bpm with longest lasting 31 seconds with " average rate of 118 bpm noted.  Pauses: None.  Heart block: None.  Ventricular tachycardia: 3 episodes of ventricular tachycardia with longest an fastest interval lasting 8 beats with a max rate of 133 bpm noted.  Rare <1% burden of premature atrial contractions, rare couplets.  Rare <1% burden of premature ventricular contractions, rare couplets.  13 Patient events correlated with sinus rhythm.  (study result reviewed)    Assessment & Plan     1. Palpitation            Medical Decision Making: Today's Assessment/Status/Plan:        Palpitations: She is a 69 year old female without cardiac history. She is doing well without recurrence of her palpitations.   Health maintenance: Her blood pressure is elevated. We discussed options of starting propranolol, however, she does not wish to start any additional mediations at this time. She suffers with significant anxiety.   Additional information: She works as a online .     We will see the patient as needed.    CC Rogelio Walls III                [1]   Past Medical History:  Diagnosis Date    Back pain     Insomnia    [2]   Past Surgical History:  Procedure Laterality Date    HYSTERECTOMY, TOTAL ABDOMINAL  1994    DENTAL EXTRACTION(S)      LITHOTRIPSY      OTHER ORTHOPEDIC SURGERY      back surgery x 2   [3]   Allergies  Allergen Reactions    Pcn [Penicillins] Hives    Sulfa Drugs Hives    Clindamycin Shortness of Breath and Swelling   [4]   Outpatient Encounter Medications as of 7/28/2025   Medication Sig Dispense Refill    HYDROcodone-acetaminophen (NORCO) 5-325 MG Tab per tablet Take 0.5 Tablets by mouth every 6 hours as needed (pain) for up to 30 days. 60 Tablet 0    zolpidem (AMBIEN) 10 MG Tab Take 1 Tablet by mouth at bedtime as needed for Sleep for up to 90 days. 30 Tablet 2    ALPRAZolam (XANAX) 0.5 MG Tab Take 1 po before flying. No more than every 8 hours as needed. 15 Tablet 0    estrogens, conjugated (PREMARIN) 0.625 MG/GM Cream Insert  0.5 g into the vagina two times a week. 30 g 3    estradiol (ESTRACE) 1 MG Tab Take 2 Tablets by mouth every day. 180 Tablet 3    valacyclovir (VALTREX) 1 GM Tab 2 po bid for 1 day prn outbreak 20 Tablet 5    Cyanocobalamin (B-12) 1000 MCG Tab Take  by mouth.      loratadine (CLARITIN) 10 MG Tab Take 10 mg by mouth every day. (Patient taking differently: Take 10 mg by mouth every day. prn)      Naproxen Sodium (ALEVE PO) Take  by mouth. (Patient taking differently: Take  by mouth. prn)      calcium carbonate (TUMS) 500 MG Chew Tab Chew 500 mg every day. (Patient taking differently: Chew 500 mg every day. prn)      Cholecalciferol (VITAMIN D PO) Take 25 mcg by mouth every day.      Multiple Vitamin (MULTI-VITAMINS) Tab Take 1 tablet by mouth.      [DISCONTINUED] HYDROcodone/acetaminophen (NORCO)  MG Tab Take 1-2 Tablets by mouth every 6 hours as needed.       No facility-administered encounter medications on file as of 7/28/2025.

## 2025-07-29 ENCOUNTER — HOSPITAL ENCOUNTER (OUTPATIENT)
Facility: MEDICAL CENTER | Age: 70
End: 2025-07-29
Attending: FAMILY MEDICINE
Payer: COMMERCIAL

## 2025-07-29 DIAGNOSIS — R92.8 ABNORMAL MAMMOGRAM OF BOTH BREASTS: ICD-10-CM

## 2025-07-29 PROCEDURE — 76642 ULTRASOUND BREAST LIMITED: CPT | Mod: RT

## 2025-07-30 ENCOUNTER — HOSPITAL ENCOUNTER (OUTPATIENT)
Dept: LAB | Facility: MEDICAL CENTER | Age: 70
End: 2025-07-30
Attending: FAMILY MEDICINE

## 2025-07-30 ENCOUNTER — HOSPITAL ENCOUNTER (OUTPATIENT)
Dept: LAB | Facility: MEDICAL CENTER | Age: 70
End: 2025-07-30
Attending: FAMILY MEDICINE
Payer: COMMERCIAL

## 2025-07-30 DIAGNOSIS — Z00.00 ADULT GENERAL MEDICAL EXAM: ICD-10-CM

## 2025-07-30 DIAGNOSIS — Z00.6 CLINICAL TRIAL PARTICIPANT: ICD-10-CM

## 2025-07-30 DIAGNOSIS — E55.9 AVITAMINOSIS D: ICD-10-CM

## 2025-07-30 DIAGNOSIS — R53.82 CHRONIC FATIGUE: ICD-10-CM

## 2025-07-30 LAB
25(OH)D3 SERPL-MCNC: 38 NG/ML (ref 30–100)
ALBUMIN SERPL BCP-MCNC: 4.2 G/DL (ref 3.2–4.9)
ALBUMIN/GLOB SERPL: 1.3 G/DL
ALP SERPL-CCNC: 73 U/L (ref 30–99)
ALT SERPL-CCNC: 18 U/L (ref 2–50)
ANION GAP SERPL CALC-SCNC: 15 MMOL/L (ref 7–16)
APPEARANCE UR: CLEAR
AST SERPL-CCNC: 23 U/L (ref 12–45)
BACTERIA #/AREA URNS HPF: ABNORMAL /HPF
BASOPHILS # BLD AUTO: 0.4 % (ref 0–1.8)
BASOPHILS # BLD: 0.05 K/UL (ref 0–0.12)
BILIRUB SERPL-MCNC: 0.6 MG/DL (ref 0.1–1.5)
BILIRUB UR QL STRIP.AUTO: NEGATIVE
BUN SERPL-MCNC: 11 MG/DL (ref 8–22)
CALCIUM ALBUM COR SERPL-MCNC: 9.5 MG/DL (ref 8.5–10.5)
CALCIUM SERPL-MCNC: 9.7 MG/DL (ref 8.5–10.5)
CASTS URNS QL MICRO: ABNORMAL /LPF (ref 0–2)
CHLORIDE SERPL-SCNC: 103 MMOL/L (ref 96–112)
CHOLEST SERPL-MCNC: 170 MG/DL (ref 100–199)
CO2 SERPL-SCNC: 22 MMOL/L (ref 20–33)
COLOR UR: YELLOW
CREAT SERPL-MCNC: 0.72 MG/DL (ref 0.5–1.4)
EOSINOPHIL # BLD AUTO: 0.03 K/UL (ref 0–0.51)
EOSINOPHIL NFR BLD: 0.3 % (ref 0–6.9)
EPITHELIAL CELLS 1715: ABNORMAL /HPF (ref 0–5)
ERYTHROCYTE [DISTWIDTH] IN BLOOD BY AUTOMATED COUNT: 39.8 FL (ref 35.9–50)
GFR SERPLBLD CREATININE-BSD FMLA CKD-EPI: 90 ML/MIN/1.73 M 2
GLOBULIN SER CALC-MCNC: 3.2 G/DL (ref 1.9–3.5)
GLUCOSE SERPL-MCNC: 92 MG/DL (ref 65–99)
GLUCOSE UR STRIP.AUTO-MCNC: NEGATIVE MG/DL
HCT VFR BLD AUTO: 41.7 % (ref 37–47)
HDLC SERPL-MCNC: 70 MG/DL
HGB BLD-MCNC: 14.1 G/DL (ref 12–16)
IMM GRANULOCYTES # BLD AUTO: 0.03 K/UL (ref 0–0.11)
IMM GRANULOCYTES NFR BLD AUTO: 0.3 % (ref 0–0.9)
KETONES UR STRIP.AUTO-MCNC: NEGATIVE MG/DL
LDLC SERPL CALC-MCNC: 85 MG/DL
LEUKOCYTE ESTERASE UR QL STRIP.AUTO: NEGATIVE
LYMPHOCYTES # BLD AUTO: 1.7 K/UL (ref 1–4.8)
LYMPHOCYTES NFR BLD: 15 % (ref 22–41)
MCH RBC QN AUTO: 31.8 PG (ref 27–33)
MCHC RBC AUTO-ENTMCNC: 33.8 G/DL (ref 32.2–35.5)
MCV RBC AUTO: 93.9 FL (ref 81.4–97.8)
MICRO URNS: ABNORMAL
MONOCYTES # BLD AUTO: 0.68 K/UL (ref 0–0.85)
MONOCYTES NFR BLD AUTO: 6 % (ref 0–13.4)
NEUTROPHILS # BLD AUTO: 8.83 K/UL (ref 1.82–7.42)
NEUTROPHILS NFR BLD: 78 % (ref 44–72)
NITRITE UR QL STRIP.AUTO: NEGATIVE
NRBC # BLD AUTO: 0 K/UL
NRBC BLD-RTO: 0 /100 WBC (ref 0–0.2)
PH UR STRIP.AUTO: 6 [PH] (ref 5–8)
PLATELET # BLD AUTO: 251 K/UL (ref 164–446)
PMV BLD AUTO: 11.4 FL (ref 9–12.9)
POTASSIUM SERPL-SCNC: 4.6 MMOL/L (ref 3.6–5.5)
PROT SERPL-MCNC: 7.4 G/DL (ref 6–8.2)
PROT UR QL STRIP: NEGATIVE MG/DL
RBC # BLD AUTO: 4.44 M/UL (ref 4.2–5.4)
RBC # URNS HPF: ABNORMAL /HPF (ref 0–2)
RBC UR QL AUTO: ABNORMAL
SODIUM SERPL-SCNC: 140 MMOL/L (ref 135–145)
SP GR UR STRIP.AUTO: 1.02
TRIGL SERPL-MCNC: 75 MG/DL (ref 0–149)
UROBILINOGEN UR STRIP.AUTO-MCNC: 0.2 EU/DL
VIT B12 SERPL-MCNC: 632 PG/ML (ref 211–911)
WBC # BLD AUTO: 11.3 K/UL (ref 4.8–10.8)
WBC #/AREA URNS HPF: ABNORMAL /HPF

## 2025-07-30 PROCEDURE — 85025 COMPLETE CBC W/AUTO DIFF WBC: CPT

## 2025-07-30 PROCEDURE — 80053 COMPREHEN METABOLIC PANEL: CPT

## 2025-07-30 PROCEDURE — 82306 VITAMIN D 25 HYDROXY: CPT

## 2025-07-30 PROCEDURE — 82607 VITAMIN B-12: CPT

## 2025-07-30 PROCEDURE — 80061 LIPID PANEL: CPT

## 2025-07-30 PROCEDURE — 36415 COLL VENOUS BLD VENIPUNCTURE: CPT

## 2025-07-30 PROCEDURE — 81001 URINALYSIS AUTO W/SCOPE: CPT

## 2025-07-31 ENCOUNTER — OFFICE VISIT (OUTPATIENT)
Dept: MEDICAL GROUP | Facility: PHYSICIAN GROUP | Age: 70
End: 2025-07-31
Payer: COMMERCIAL

## 2025-07-31 ASSESSMENT — FIBROSIS 4 INDEX: FIB4 SCORE: 1.51

## 2025-08-01 PROBLEM — Z86.0101 HISTORY OF ADENOMATOUS AND SERRATED COLON POLYPS: Status: ACTIVE | Noted: 2025-08-01

## 2025-08-01 PROBLEM — R92.8 ABNORMAL MAMMOGRAM OF BOTH BREASTS: Status: ACTIVE | Noted: 2025-08-01

## 2025-08-01 PROBLEM — R19.5 POSITIVE COLORECTAL CANCER SCREENING USING COLOGUARD TEST: Status: RESOLVED | Noted: 2024-12-13 | Resolved: 2025-08-01

## 2025-08-11 ENCOUNTER — RESULTS FOLLOW-UP (OUTPATIENT)
Dept: MEDICAL GROUP | Facility: PHYSICIAN GROUP | Age: 70
End: 2025-08-11
Payer: COMMERCIAL

## 2025-08-11 LAB
APOB+LDLR+PCSK9 GENE MUT ANL BLD/T: NOT DETECTED
BRCA1+BRCA2 DEL+DUP + FULL MUT ANL BLD/T: NOT DETECTED
MLH1+MSH2+MSH6+PMS2 GN DEL+DUP+FUL M: NOT DETECTED

## 2025-08-18 ENCOUNTER — APPOINTMENT (OUTPATIENT)
Dept: MEDICAL GROUP | Facility: PHYSICIAN GROUP | Age: 70
End: 2025-08-18
Payer: COMMERCIAL

## 2025-08-18 ASSESSMENT — FIBROSIS 4 INDEX: FIB4 SCORE: 1.51

## 2025-08-25 ENCOUNTER — HOSPITAL ENCOUNTER (OUTPATIENT)
Facility: MEDICAL CENTER | Age: 70
End: 2025-08-25
Attending: FAMILY MEDICINE
Payer: COMMERCIAL

## 2025-08-25 DIAGNOSIS — R92.8 ABNORMAL FINDINGS ON DIAGNOSTIC IMAGING OF BREAST: ICD-10-CM

## 2025-08-25 LAB
CYTOLOGY REG CYTOL: NORMAL
PATHOLOGY CONSULT NOTE: NORMAL

## 2025-08-25 PROCEDURE — A4648 IMPLANTABLE TISSUE MARKER: HCPCS

## 2025-08-25 PROCEDURE — 88305 TISSUE EXAM BY PATHOLOGIST: CPT | Performed by: PATHOLOGY

## 2025-08-25 PROCEDURE — 88305 TISSUE EXAM BY PATHOLOGIST: CPT | Mod: 26 | Performed by: PATHOLOGY

## 2025-08-25 PROCEDURE — 88112 CYTOPATH CELL ENHANCE TECH: CPT | Performed by: PATHOLOGY

## 2025-08-25 PROCEDURE — 88112 CYTOPATH CELL ENHANCE TECH: CPT | Mod: 26 | Performed by: PATHOLOGY

## 2025-08-25 PROCEDURE — 19083 BX BREAST 1ST LESION US IMAG: CPT | Mod: RT

## 2025-08-28 ENCOUNTER — TELEPHONE (OUTPATIENT)
Facility: MEDICAL CENTER | Age: 70
End: 2025-08-28
Payer: COMMERCIAL

## 2025-11-10 ENCOUNTER — APPOINTMENT (OUTPATIENT)
Dept: MEDICAL GROUP | Facility: PHYSICIAN GROUP | Age: 70
End: 2025-11-10
Payer: COMMERCIAL